# Patient Record
Sex: MALE | Race: BLACK OR AFRICAN AMERICAN | Employment: UNEMPLOYED | URBAN - METROPOLITAN AREA
[De-identification: names, ages, dates, MRNs, and addresses within clinical notes are randomized per-mention and may not be internally consistent; named-entity substitution may affect disease eponyms.]

---

## 2019-07-17 ENCOUNTER — HOSPITAL ENCOUNTER (EMERGENCY)
Age: 39
Discharge: HOME | End: 2019-07-17
Attending: EMERGENCY MEDICINE

## 2019-07-17 VITALS
RESPIRATION RATE: 17 BRPM | WEIGHT: 180 LBS | HEIGHT: 69 IN | OXYGEN SATURATION: 94 % | BODY MASS INDEX: 26.66 KG/M2 | TEMPERATURE: 97.9 F | SYSTOLIC BLOOD PRESSURE: 140 MMHG | DIASTOLIC BLOOD PRESSURE: 86 MMHG | HEART RATE: 109 BPM

## 2019-07-17 DIAGNOSIS — F10.930 ALCOHOL WITHDRAWAL SYNDROME WITHOUT COMPLICATION (HCC): Primary | ICD-10-CM

## 2019-07-17 PROCEDURE — 99284 EMERGENCY DEPT VISIT MOD MDM: CPT

## 2019-07-17 RX ORDER — DIAZEPAM 10 MG/1
10 TABLET ORAL ONCE
Status: DISCONTINUED | OUTPATIENT
Start: 2019-07-17 | End: 2019-07-17 | Stop reason: HOSPADM

## 2019-07-19 ASSESSMENT — ENCOUNTER SYMPTOMS
RHINORRHEA: 0
EYE PAIN: 0
ABDOMINAL DISTENTION: 0
DIARRHEA: 0
VOMITING: 0
TROUBLE SWALLOWING: 0
COUGH: 0
CHEST TIGHTNESS: 0
WHEEZING: 0
SORE THROAT: 0
SHORTNESS OF BREATH: 0
COLOR CHANGE: 0
ABDOMINAL PAIN: 0
NAUSEA: 0

## 2019-07-19 NOTE — ED PROVIDER NOTES
[address] [facility], [facility] ENCOUNTER          PHYSICIAN ASSISTANT NOTE       Date of evaluation: 7/17/2019    Chief Complaint     Alcohol Problem (wants help detoxing from etoh)      History of Present Illness     Fatoumata Thomas is a 42 y.o. male with a past medical history as noted below who presents to the Emergency Department with a complaint of concerns for alcohol withdrawal.  The patient reports that he has been an alcoholic for over 10 years. He reports that he wants to stop drinking and has been attempting to do so. He reports normally he drinks at least 1/5 of vodka on a daily basis. Over the past 2 days, he has decreased that intake to about half of the fifth. Today, he is starting to experience some agitation and restlessness. He feels shaky. Has a slight headache. He is concerned he is developing signs and symptoms of withdrawal.  He reports he was seen at an outside hospital a couple of days ago, where they prescribed him Librium. However, he reports that he is unfamiliar with this medication and reports he was not told anything about the medication, its side effects or its appropriate dosing. Because of this, he has not filled the prescription. He is worried, because he has had delirium tremens in the past and he does not want to go through that again. Currently he denies any fevers, chills, sweats or other constitutional symptoms. No seizure activity. No hallucinations or psychoses. He has no suicidal or homicidal ideations. Review of Systems     Review of Systems   Constitutional: Negative for chills, diaphoresis, fatigue and fever. HENT: Negative for congestion, postnasal drip, rhinorrhea, sore throat and trouble swallowing. Eyes: Negative for pain and visual disturbance. Respiratory: Negative for cough, chest tightness, shortness of breath and wheezing. Cardiovascular: Negative for chest pain, palpitations and leg swelling.    Gastrointestinal: Negative for abdominal distention, abdominal pain, diarrhea, nausea and vomiting. Endocrine: Negative for polydipsia and polyuria. Genitourinary: Negative for dysuria. Musculoskeletal: Negative for myalgias, neck pain and neck stiffness. Skin: Negative for color change, pallor and rash. Neurological: Positive for headaches. Negative for dizziness, weakness and light-headedness. Hematological: Does not bruise/bleed easily. Psychiatric/Behavioral: Positive for agitation and sleep disturbance. Negative for confusion, hallucinations, self-injury and suicidal ideas. The patient is nervous/anxious. All other systems reviewed and are negative. Past Medical, Surgical, Family, and Social History     He has no past medical history on file. He has no past surgical history on file. His family history is not on file. He     Medications     There are no discharge medications for this patient. Allergies     He has no allergies on file. Physical Exam     INITIAL VITALS: BP: (!) 140/99, Temp: 97.9 °F (36.6 °C), Pulse: 118, Resp: 22, SpO2: 94 %  Physical Exam   Constitutional: He is oriented to person, place, and time. He appears well-developed and well-nourished. No distress. HENT:   Head: Normocephalic and atraumatic. Eyes: Pupils are equal, round, and reactive to light. EOM are normal.   Neck: Normal range of motion. Neck supple. No JVD present. Cardiovascular: Regular rhythm. Tachycardia present. Pulmonary/Chest: Effort normal and breath sounds normal. No stridor. No respiratory distress. He has no wheezes. He has no rales. He exhibits no tenderness. Abdominal: Soft. He exhibits no distension. There is no tenderness. Musculoskeletal: Normal range of motion. He exhibits no edema or tenderness. Neurological: He is alert and oriented to person, place, and time. Coordination normal.   Skin: Skin is warm and dry. No rash noted. He is not diaphoretic. No erythema. No pallor.    Psychiatric:

## 2019-09-06 ENCOUNTER — HOSPITAL ENCOUNTER (EMERGENCY)
Age: 39
Discharge: HOME | End: 2019-09-06
Attending: EMERGENCY MEDICINE

## 2019-09-06 VITALS
HEIGHT: 69 IN | RESPIRATION RATE: 16 BRPM | SYSTOLIC BLOOD PRESSURE: 138 MMHG | OXYGEN SATURATION: 96 % | BODY MASS INDEX: 26.66 KG/M2 | DIASTOLIC BLOOD PRESSURE: 89 MMHG | WEIGHT: 180 LBS | HEART RATE: 114 BPM

## 2019-09-06 DIAGNOSIS — F10.930 ALCOHOL WITHDRAWAL SYNDROME WITHOUT COMPLICATION (HCC): Primary | ICD-10-CM

## 2019-09-06 PROCEDURE — 99284 EMERGENCY DEPT VISIT MOD MDM: CPT

## 2019-09-06 PROCEDURE — 6370000000 HC RX 637 (ALT 250 FOR IP): Performed by: EMERGENCY MEDICINE

## 2019-09-06 RX ORDER — DIAZEPAM 10 MG/1
10 TABLET ORAL ONCE
Status: COMPLETED | OUTPATIENT
Start: 2019-09-06 | End: 2019-09-06

## 2019-09-06 RX ORDER — CHLORDIAZEPOXIDE HYDROCHLORIDE 5 MG/1
5 CAPSULE, GELATIN COATED ORAL SEE ADMIN INSTRUCTIONS
Qty: 21 CAPSULE | Refills: 0 | Status: SHIPPED | OUTPATIENT
Start: 2019-09-06 | End: 2019-10-06

## 2019-09-06 RX ORDER — DIAZEPAM 5 MG/1
5 TABLET ORAL EVERY 12 HOURS
Qty: 14 TABLET | Refills: 0 | Status: SHIPPED | OUTPATIENT
Start: 2019-09-06 | End: 2019-09-06

## 2019-09-06 RX ADMIN — DIAZEPAM 10 MG: 10 TABLET ORAL at 15:33

## 2019-09-08 ENCOUNTER — HOSPITAL ENCOUNTER (EMERGENCY)
Age: 39
Discharge: HOME | End: 2019-09-08
Attending: EMERGENCY MEDICINE

## 2019-09-08 VITALS
HEIGHT: 69 IN | TEMPERATURE: 98.4 F | SYSTOLIC BLOOD PRESSURE: 158 MMHG | OXYGEN SATURATION: 97 % | WEIGHT: 185 LBS | BODY MASS INDEX: 27.4 KG/M2 | HEART RATE: 127 BPM | RESPIRATION RATE: 22 BRPM | DIASTOLIC BLOOD PRESSURE: 100 MMHG

## 2019-09-08 DIAGNOSIS — R11.2 NON-INTRACTABLE VOMITING WITH NAUSEA, UNSPECIFIED VOMITING TYPE: Primary | ICD-10-CM

## 2019-09-08 LAB
ANION GAP SERPL CALCULATED.3IONS-SCNC: 31 MMOL/L (ref 3–16)
BASE EXCESS VENOUS: 3 (ref -3–3)
BUN BLDV-MCNC: 20 MG/DL (ref 7–20)
CALCIUM SERPL-MCNC: 9.4 MG/DL (ref 8.3–10.6)
CHLORIDE BLD-SCNC: 77 MMOL/L (ref 99–110)
CO2: 20 MMOL/L (ref 21–32)
CREAT SERPL-MCNC: 1.3 MG/DL (ref 0.9–1.3)
GFR AFRICAN AMERICAN: >60
GFR NON-AFRICAN AMERICAN: >60
GLUCOSE BLD-MCNC: 110 MG/DL (ref 70–99)
HCO3 VENOUS: 27.1 MMOL/L (ref 23–29)
LACTATE: 1.5 MMOL/L (ref 0.4–2)
O2 SAT, VEN: 55 %
PCO2, VEN: 39.5 MM HG (ref 40–50)
PERFORMED ON: ABNORMAL
PH VENOUS: 7.45 (ref 7.35–7.45)
PO2, VEN: 28 MM HG
POC SAMPLE TYPE: ABNORMAL
POTASSIUM REFLEX MAGNESIUM: 4.1 MMOL/L (ref 3.5–5.1)
SODIUM BLD-SCNC: 128 MMOL/L (ref 136–145)
TCO2 CALC VENOUS: 28 MMOL/L

## 2019-09-08 PROCEDURE — 6360000002 HC RX W HCPCS: Performed by: STUDENT IN AN ORGANIZED HEALTH CARE EDUCATION/TRAINING PROGRAM

## 2019-09-08 PROCEDURE — 96361 HYDRATE IV INFUSION ADD-ON: CPT

## 2019-09-08 PROCEDURE — 2580000003 HC RX 258: Performed by: STUDENT IN AN ORGANIZED HEALTH CARE EDUCATION/TRAINING PROGRAM

## 2019-09-08 PROCEDURE — 83605 ASSAY OF LACTIC ACID: CPT

## 2019-09-08 PROCEDURE — 82803 BLOOD GASES ANY COMBINATION: CPT

## 2019-09-08 PROCEDURE — 6370000000 HC RX 637 (ALT 250 FOR IP): Performed by: STUDENT IN AN ORGANIZED HEALTH CARE EDUCATION/TRAINING PROGRAM

## 2019-09-08 PROCEDURE — 99283 EMERGENCY DEPT VISIT LOW MDM: CPT

## 2019-09-08 PROCEDURE — 96374 THER/PROPH/DIAG INJ IV PUSH: CPT

## 2019-09-08 PROCEDURE — 80048 BASIC METABOLIC PNL TOTAL CA: CPT

## 2019-09-08 RX ORDER — ONDANSETRON 2 MG/ML
8 INJECTION INTRAMUSCULAR; INTRAVENOUS ONCE
Status: COMPLETED | OUTPATIENT
Start: 2019-09-08 | End: 2019-09-08

## 2019-09-08 RX ORDER — ONDANSETRON 4 MG/1
4 TABLET, FILM COATED ORAL EVERY 8 HOURS PRN
Qty: 20 TABLET | Refills: 0 | Status: SHIPPED | OUTPATIENT
Start: 2019-09-08 | End: 2020-08-05

## 2019-09-08 RX ORDER — SODIUM CHLORIDE, SODIUM LACTATE, POTASSIUM CHLORIDE, CALCIUM CHLORIDE 600; 310; 30; 20 MG/100ML; MG/100ML; MG/100ML; MG/100ML
1000 INJECTION, SOLUTION INTRAVENOUS ONCE
Status: COMPLETED | OUTPATIENT
Start: 2019-09-08 | End: 2019-09-08

## 2019-09-08 RX ORDER — DIAZEPAM 10 MG/1
10 TABLET ORAL ONCE
Status: COMPLETED | OUTPATIENT
Start: 2019-09-08 | End: 2019-09-08

## 2019-09-08 RX ADMIN — ONDANSETRON 8 MG: 2 INJECTION INTRAMUSCULAR; INTRAVENOUS at 10:59

## 2019-09-08 RX ADMIN — SODIUM CHLORIDE, POTASSIUM CHLORIDE, SODIUM LACTATE AND CALCIUM CHLORIDE 1000 ML: 600; 310; 30; 20 INJECTION, SOLUTION INTRAVENOUS at 10:59

## 2019-09-08 RX ADMIN — DIAZEPAM 10 MG: 10 TABLET ORAL at 11:15

## 2019-12-17 ENCOUNTER — HOSPITAL ENCOUNTER (EMERGENCY)
Age: 39
Discharge: HOME | End: 2019-12-17
Attending: EMERGENCY MEDICINE

## 2019-12-17 VITALS
WEIGHT: 195 LBS | RESPIRATION RATE: 16 BRPM | OXYGEN SATURATION: 100 % | BODY MASS INDEX: 28.88 KG/M2 | HEIGHT: 69 IN | HEART RATE: 99 BPM | SYSTOLIC BLOOD PRESSURE: 128 MMHG | DIASTOLIC BLOOD PRESSURE: 85 MMHG | TEMPERATURE: 98.6 F

## 2019-12-17 DIAGNOSIS — R11.2 NAUSEA AND VOMITING, INTRACTABILITY OF VOMITING NOT SPECIFIED, UNSPECIFIED VOMITING TYPE: Primary | ICD-10-CM

## 2019-12-17 LAB
ALBUMIN SERPL-MCNC: 5.2 G/DL (ref 3.4–5)
ALP BLD-CCNC: 81 U/L (ref 40–129)
ALT SERPL-CCNC: 61 U/L (ref 10–40)
ANION GAP SERPL CALCULATED.3IONS-SCNC: 18 MMOL/L (ref 3–16)
ANION GAP SERPL CALCULATED.3IONS-SCNC: 24 MMOL/L (ref 3–16)
AST SERPL-CCNC: 91 U/L (ref 15–37)
BACTERIA: ABNORMAL /HPF
BASE EXCESS VENOUS: 7 (ref -3–3)
BASOPHILS ABSOLUTE: 0 K/UL (ref 0–0.2)
BASOPHILS RELATIVE PERCENT: 0.6 %
BILIRUB SERPL-MCNC: 1.3 MG/DL (ref 0–1)
BILIRUBIN DIRECT: 0.3 MG/DL (ref 0–0.3)
BILIRUBIN URINE: ABNORMAL
BILIRUBIN, INDIRECT: 1 MG/DL (ref 0–1)
BLOOD, URINE: ABNORMAL
BUN BLDV-MCNC: 28 MG/DL (ref 7–20)
BUN BLDV-MCNC: 33 MG/DL (ref 7–20)
CALCIUM SERPL-MCNC: 10.4 MG/DL (ref 8.3–10.6)
CALCIUM SERPL-MCNC: 9.9 MG/DL (ref 8.3–10.6)
CASTS: ABNORMAL /LPF
CHLORIDE BLD-SCNC: 75 MMOL/L (ref 99–110)
CHLORIDE BLD-SCNC: 82 MMOL/L (ref 99–110)
CLARITY: CLEAR
CO2: 28 MMOL/L (ref 21–32)
CO2: 28 MMOL/L (ref 21–32)
COLOR: YELLOW
CREAT SERPL-MCNC: 1.3 MG/DL (ref 0.9–1.3)
CREAT SERPL-MCNC: 1.9 MG/DL (ref 0.9–1.3)
EOSINOPHILS ABSOLUTE: 0 K/UL (ref 0–0.6)
EOSINOPHILS RELATIVE PERCENT: 0.9 %
EPITHELIAL CELLS, UA: ABNORMAL /HPF
GFR AFRICAN AMERICAN: 48
GFR AFRICAN AMERICAN: >60
GFR NON-AFRICAN AMERICAN: 40
GFR NON-AFRICAN AMERICAN: >60
GLUCOSE BLD-MCNC: 101 MG/DL (ref 70–99)
GLUCOSE BLD-MCNC: 138 MG/DL (ref 70–99)
GLUCOSE URINE: NEGATIVE MG/DL
HCO3 VENOUS: 31.1 MMOL/L (ref 23–29)
HCT VFR BLD CALC: 42.7 % (ref 40.5–52.5)
HEMOGLOBIN: 14.8 G/DL (ref 13.5–17.5)
KETONES, URINE: 15 MG/DL
LEUKOCYTE ESTERASE, URINE: ABNORMAL
LIPASE: 54 U/L (ref 13–60)
LYMPHOCYTES ABSOLUTE: 0.7 K/UL (ref 1–5.1)
LYMPHOCYTES RELATIVE PERCENT: 18.1 %
MAGNESIUM: 2 MG/DL (ref 1.8–2.4)
MAGNESIUM: 2 MG/DL (ref 1.8–2.4)
MCH RBC QN AUTO: 34.5 PG (ref 26–34)
MCHC RBC AUTO-ENTMCNC: 34.7 G/DL (ref 31–36)
MCV RBC AUTO: 99.3 FL (ref 80–100)
MICROSCOPIC EXAMINATION: YES
MONOCYTES ABSOLUTE: 0.8 K/UL (ref 0–1.3)
MONOCYTES RELATIVE PERCENT: 19.9 %
MUCUS: ABNORMAL /LPF
NEUTROPHILS ABSOLUTE: 2.4 K/UL (ref 1.7–7.7)
NEUTROPHILS RELATIVE PERCENT: 60.5 %
NITRITE, URINE: NEGATIVE
O2 SAT, VEN: 52 %
PCO2, VEN: 42.6 MM HG (ref 40–50)
PDW BLD-RTO: 13.8 % (ref 12.4–15.4)
PERFORMED ON: ABNORMAL
PH UA: 6 (ref 5–8)
PH VENOUS: 7.47 (ref 7.35–7.45)
PLATELET # BLD: 181 K/UL (ref 135–450)
PMV BLD AUTO: 7.5 FL (ref 5–10.5)
PO2, VEN: 26 MM HG
POC SAMPLE TYPE: ABNORMAL
POTASSIUM REFLEX MAGNESIUM: 2.8 MMOL/L (ref 3.5–5.1)
POTASSIUM REFLEX MAGNESIUM: 3.5 MMOL/L (ref 3.5–5.1)
PROTEIN UA: 30 MG/DL
RBC # BLD: 4.3 M/UL (ref 4.2–5.9)
RBC UA: ABNORMAL /HPF (ref 0–2)
SODIUM BLD-SCNC: 127 MMOL/L (ref 136–145)
SODIUM BLD-SCNC: 128 MMOL/L (ref 136–145)
SPECIFIC GRAVITY UA: 1.02 (ref 1–1.03)
TCO2 CALC VENOUS: 32 MMOL/L
TOTAL PROTEIN: 8.6 G/DL (ref 6.4–8.2)
URINE TYPE: ABNORMAL
UROBILINOGEN, URINE: 0.2 E.U./DL
WBC # BLD: 4 K/UL (ref 4–11)
WBC UA: ABNORMAL /HPF (ref 0–5)

## 2019-12-17 PROCEDURE — 83735 ASSAY OF MAGNESIUM: CPT

## 2019-12-17 PROCEDURE — 80048 BASIC METABOLIC PNL TOTAL CA: CPT

## 2019-12-17 PROCEDURE — 96375 TX/PRO/DX INJ NEW DRUG ADDON: CPT

## 2019-12-17 PROCEDURE — 83690 ASSAY OF LIPASE: CPT

## 2019-12-17 PROCEDURE — 81001 URINALYSIS AUTO W/SCOPE: CPT

## 2019-12-17 PROCEDURE — 96365 THER/PROPH/DIAG IV INF INIT: CPT

## 2019-12-17 PROCEDURE — 80076 HEPATIC FUNCTION PANEL: CPT

## 2019-12-17 PROCEDURE — 6360000002 HC RX W HCPCS: Performed by: STUDENT IN AN ORGANIZED HEALTH CARE EDUCATION/TRAINING PROGRAM

## 2019-12-17 PROCEDURE — 2580000003 HC RX 258: Performed by: STUDENT IN AN ORGANIZED HEALTH CARE EDUCATION/TRAINING PROGRAM

## 2019-12-17 PROCEDURE — 85025 COMPLETE CBC W/AUTO DIFF WBC: CPT

## 2019-12-17 PROCEDURE — 36415 COLL VENOUS BLD VENIPUNCTURE: CPT

## 2019-12-17 PROCEDURE — 82803 BLOOD GASES ANY COMBINATION: CPT

## 2019-12-17 PROCEDURE — 99284 EMERGENCY DEPT VISIT MOD MDM: CPT

## 2019-12-17 PROCEDURE — 96361 HYDRATE IV INFUSION ADD-ON: CPT

## 2019-12-17 RX ORDER — POTASSIUM CHLORIDE 7.45 MG/ML
10 INJECTION INTRAVENOUS ONCE
Status: COMPLETED | OUTPATIENT
Start: 2019-12-17 | End: 2019-12-17

## 2019-12-17 RX ORDER — FAMOTIDINE 20 MG/1
20 TABLET, FILM COATED ORAL 2 TIMES DAILY
Qty: 60 TABLET | Refills: 0 | Status: SHIPPED | OUTPATIENT
Start: 2019-12-17 | End: 2020-09-29 | Stop reason: ALTCHOICE

## 2019-12-17 RX ORDER — ONDANSETRON 4 MG/1
4 TABLET, FILM COATED ORAL EVERY 8 HOURS PRN
Qty: 20 TABLET | Refills: 0 | Status: SHIPPED | OUTPATIENT
Start: 2019-12-17 | End: 2019-12-24

## 2019-12-17 RX ORDER — 0.9 % SODIUM CHLORIDE 0.9 %
1000 INTRAVENOUS SOLUTION INTRAVENOUS ONCE
Status: COMPLETED | OUTPATIENT
Start: 2019-12-17 | End: 2019-12-17

## 2019-12-17 RX ORDER — CHLORDIAZEPOXIDE HYDROCHLORIDE 10 MG/1
CAPSULE, GELATIN COATED ORAL
Refills: 0 | COMMUNITY
Start: 2019-12-08 | End: 2020-08-05

## 2019-12-17 RX ORDER — LISINOPRIL 40 MG/1
TABLET ORAL
Refills: 0 | COMMUNITY
Start: 2019-11-29 | End: 2020-04-04

## 2019-12-17 RX ORDER — ONDANSETRON 2 MG/ML
8 INJECTION INTRAMUSCULAR; INTRAVENOUS ONCE
Status: COMPLETED | OUTPATIENT
Start: 2019-12-17 | End: 2019-12-17

## 2019-12-17 RX ORDER — SODIUM CHLORIDE, SODIUM LACTATE, POTASSIUM CHLORIDE, CALCIUM CHLORIDE 600; 310; 30; 20 MG/100ML; MG/100ML; MG/100ML; MG/100ML
1000 INJECTION, SOLUTION INTRAVENOUS ONCE
Status: COMPLETED | OUTPATIENT
Start: 2019-12-17 | End: 2019-12-17

## 2019-12-17 RX ADMIN — ONDANSETRON 8 MG: 2 INJECTION INTRAMUSCULAR; INTRAVENOUS at 12:26

## 2019-12-17 RX ADMIN — POTASSIUM CHLORIDE 10 MEQ: 7.46 INJECTION, SOLUTION INTRAVENOUS at 13:10

## 2019-12-17 RX ADMIN — SODIUM CHLORIDE, POTASSIUM CHLORIDE, SODIUM LACTATE AND CALCIUM CHLORIDE 1000 ML: 600; 310; 30; 20 INJECTION, SOLUTION INTRAVENOUS at 12:25

## 2019-12-17 RX ADMIN — SODIUM CHLORIDE 1000 ML: 9 INJECTION, SOLUTION INTRAVENOUS at 13:10

## 2020-04-04 ENCOUNTER — HOSPITAL ENCOUNTER (EMERGENCY)
Age: 40
Discharge: HOME | End: 2020-04-04
Attending: EMERGENCY MEDICINE

## 2020-04-04 VITALS
SYSTOLIC BLOOD PRESSURE: 166 MMHG | RESPIRATION RATE: 18 BRPM | WEIGHT: 195 LBS | TEMPERATURE: 98.6 F | DIASTOLIC BLOOD PRESSURE: 98 MMHG | OXYGEN SATURATION: 98 % | BODY MASS INDEX: 28.8 KG/M2 | HEART RATE: 102 BPM

## 2020-04-04 PROCEDURE — 6370000000 HC RX 637 (ALT 250 FOR IP): Performed by: EMERGENCY MEDICINE

## 2020-04-04 PROCEDURE — 99283 EMERGENCY DEPT VISIT LOW MDM: CPT

## 2020-04-04 RX ORDER — ONDANSETRON 4 MG/1
4 TABLET, ORALLY DISINTEGRATING ORAL EVERY 8 HOURS PRN
Qty: 20 TABLET | Refills: 0 | Status: SHIPPED | OUTPATIENT
Start: 2020-04-04 | End: 2020-08-05

## 2020-04-04 RX ORDER — CARVEDILOL 6.25 MG/1
6.25 TABLET ORAL 2 TIMES DAILY WITH MEALS
COMMUNITY
End: 2020-09-29 | Stop reason: SINTOL

## 2020-04-04 RX ORDER — DIAZEPAM 5 MG/1
5 TABLET ORAL PRN
Qty: 17 TABLET | Refills: 0 | Status: SHIPPED | OUTPATIENT
Start: 2020-04-04 | End: 2020-04-14

## 2020-04-04 RX ORDER — DIAZEPAM 10 MG/1
10 TABLET ORAL ONCE
Status: COMPLETED | OUTPATIENT
Start: 2020-04-04 | End: 2020-04-04

## 2020-04-04 RX ADMIN — DIAZEPAM 10 MG: 10 TABLET ORAL at 08:40

## 2020-04-04 ASSESSMENT — ENCOUNTER SYMPTOMS
NAUSEA: 1
CHEST TIGHTNESS: 0
VOMITING: 1
SHORTNESS OF BREATH: 0
ABDOMINAL PAIN: 1

## 2020-04-04 ASSESSMENT — PAIN DESCRIPTION - FREQUENCY: FREQUENCY: CONTINUOUS

## 2020-04-04 ASSESSMENT — PAIN DESCRIPTION - LOCATION: LOCATION: ABDOMEN

## 2020-04-04 ASSESSMENT — PAIN SCALES - GENERAL: PAINLEVEL_OUTOF10: 8

## 2020-04-04 NOTE — ED PROVIDER NOTES
by mouth 2 times daily    ONDANSETRON (ZOFRAN) 4 MG TABLET    Take 1 tablet by mouth every 8 hours as needed for Nausea       Allergies     He is allergic to banana and tree nut  [macadamia nut oil]. Physical Exam     INITIAL VITALS: BP: (!) 181/118, Temp: 98.6 °F (37 °C), Pulse: 110, Resp: 18, SpO2: 96 %   Physical Exam  Vitals signs and nursing note reviewed. Constitutional:       General: He is not in acute distress. Appearance: He is well-developed. HENT:      Head: Normocephalic and atraumatic. Nose: No rhinorrhea. Eyes:      Pupils: Pupils are equal, round, and reactive to light. Neck:      Musculoskeletal: Normal range of motion. Cardiovascular:      Rate and Rhythm: Regular rhythm. Tachycardia present. Heart sounds: Normal heart sounds. No murmur. Pulmonary:      Effort: No respiratory distress. Breath sounds: Normal breath sounds. No wheezing or rales. Abdominal:      General: There is no distension. Tenderness: There is no abdominal tenderness. Musculoskeletal: Normal range of motion. Skin:     General: Skin is warm and dry. Neurological:      Mental Status: He is alert and oriented to person, place, and time. Cranial Nerves: No cranial nerve deficit. Coordination: Coordination normal.   Psychiatric:         Mood and Affect: Mood is anxious. Behavior: Behavior is hyperactive. Diagnostic Results     RADIOLOGY:  No orders to display       LABS:   No results found for this visit on 04/04/20. RECENT VITALS:  BP: (!) 181/118,Temp: 98.6 °F (37 °C), Pulse: 110, Resp: 18, SpO2: 96 %       ED Course     Nursing Notes, Past Medical Hx, Past Surgical Hx, Social Hx,Allergies, and Family Hx were reviewed.     patient was given the following medications:  Orders Placed This Encounter   Medications    diazePAM (VALIUM) tablet 10 mg       CONSULTS:  None    MEDICAL DECISIONMAKING / ASSESSMENT / PLAN     Lady Driver is a 47 y.o. male who presents stating that he is having difficulty with alcohol withdrawal.  Had a CIWA scale of 10 here. Patient's last drink was just about 8 hours ago. Given Valium here with significant relief of symptoms. Will give short course of Valium prescription. Patient warned of the dangers of mixing benzodiazepines with alcohol. He does live at home with his father who can check on him. Clinical Impression     1. Alcohol abuse    2. Alcohol withdrawal syndrome without complication (HCC)        Disposition     PATIENT REFERRED TO:  No follow-up provider specified.     DISCHARGE MEDICATIONS:  New Prescriptions    No medications on file       DISPOSITION Decision To Discharge 04/04/2020 08:28:59 AM       Rita Hammond MD  04/04/20 0547

## 2020-08-05 ENCOUNTER — HOSPITAL ENCOUNTER (EMERGENCY)
Age: 40
Discharge: HOME | End: 2020-08-05
Attending: EMERGENCY MEDICINE
Payer: MEDICAID

## 2020-08-05 VITALS
BODY MASS INDEX: 28.8 KG/M2 | HEART RATE: 83 BPM | RESPIRATION RATE: 16 BRPM | OXYGEN SATURATION: 96 % | DIASTOLIC BLOOD PRESSURE: 102 MMHG | WEIGHT: 195 LBS | SYSTOLIC BLOOD PRESSURE: 145 MMHG | TEMPERATURE: 98 F

## 2020-08-05 LAB
ALBUMIN SERPL-MCNC: 5.1 G/DL (ref 3.4–5)
ALP BLD-CCNC: 94 U/L (ref 40–129)
ALT SERPL-CCNC: 78 U/L (ref 10–40)
ANION GAP SERPL CALCULATED.3IONS-SCNC: 23 MMOL/L (ref 3–16)
AST SERPL-CCNC: 183 U/L (ref 15–37)
BASOPHILS ABSOLUTE: 0 K/UL (ref 0–0.2)
BASOPHILS RELATIVE PERCENT: 0.8 %
BILIRUB SERPL-MCNC: 0.6 MG/DL (ref 0–1)
BILIRUBIN DIRECT: <0.2 MG/DL (ref 0–0.3)
BILIRUBIN, INDIRECT: ABNORMAL MG/DL (ref 0–1)
BUN BLDV-MCNC: 10 MG/DL (ref 7–20)
CALCIUM SERPL-MCNC: 9.7 MG/DL (ref 8.3–10.6)
CHLORIDE BLD-SCNC: 96 MMOL/L (ref 99–110)
CO2: 20 MMOL/L (ref 21–32)
CREAT SERPL-MCNC: 1 MG/DL (ref 0.9–1.3)
EOSINOPHILS ABSOLUTE: 0.1 K/UL (ref 0–0.6)
EOSINOPHILS RELATIVE PERCENT: 1.6 %
ETHANOL: 73 MG/DL (ref 0–0.08)
GFR AFRICAN AMERICAN: >60
GFR NON-AFRICAN AMERICAN: >60
GLUCOSE BLD-MCNC: 121 MG/DL (ref 70–99)
HCT VFR BLD CALC: 37.2 % (ref 40.5–52.5)
HEMOGLOBIN: 12.6 G/DL (ref 13.5–17.5)
LIPASE: 72 U/L (ref 13–60)
LYMPHOCYTES ABSOLUTE: 1.2 K/UL (ref 1–5.1)
LYMPHOCYTES RELATIVE PERCENT: 31.8 %
MAGNESIUM: 1.9 MG/DL (ref 1.8–2.4)
MCH RBC QN AUTO: 34.5 PG (ref 26–34)
MCHC RBC AUTO-ENTMCNC: 33.9 G/DL (ref 31–36)
MCV RBC AUTO: 101.8 FL (ref 80–100)
MONOCYTES ABSOLUTE: 0.4 K/UL (ref 0–1.3)
MONOCYTES RELATIVE PERCENT: 9.9 %
NEUTROPHILS ABSOLUTE: 2.1 K/UL (ref 1.7–7.7)
NEUTROPHILS RELATIVE PERCENT: 55.9 %
PDW BLD-RTO: 15.3 % (ref 12.4–15.4)
PHOSPHORUS: 3.4 MG/DL (ref 2.5–4.9)
PLATELET # BLD: 166 K/UL (ref 135–450)
PMV BLD AUTO: 7 FL (ref 5–10.5)
POTASSIUM SERPL-SCNC: 3.9 MMOL/L (ref 3.5–5.1)
RBC # BLD: 3.65 M/UL (ref 4.2–5.9)
SODIUM BLD-SCNC: 139 MMOL/L (ref 136–145)
TOTAL PROTEIN: 8.4 G/DL (ref 6.4–8.2)
WBC # BLD: 3.8 K/UL (ref 4–11)

## 2020-08-05 PROCEDURE — 84100 ASSAY OF PHOSPHORUS: CPT

## 2020-08-05 PROCEDURE — 2500000003 HC RX 250 WO HCPCS: Performed by: EMERGENCY MEDICINE

## 2020-08-05 PROCEDURE — 83690 ASSAY OF LIPASE: CPT

## 2020-08-05 PROCEDURE — 83735 ASSAY OF MAGNESIUM: CPT

## 2020-08-05 PROCEDURE — 6360000002 HC RX W HCPCS: Performed by: EMERGENCY MEDICINE

## 2020-08-05 PROCEDURE — 99284 EMERGENCY DEPT VISIT MOD MDM: CPT

## 2020-08-05 PROCEDURE — 6370000000 HC RX 637 (ALT 250 FOR IP): Performed by: EMERGENCY MEDICINE

## 2020-08-05 PROCEDURE — 2580000003 HC RX 258: Performed by: EMERGENCY MEDICINE

## 2020-08-05 PROCEDURE — 96374 THER/PROPH/DIAG INJ IV PUSH: CPT

## 2020-08-05 PROCEDURE — G0480 DRUG TEST DEF 1-7 CLASSES: HCPCS

## 2020-08-05 PROCEDURE — 80048 BASIC METABOLIC PNL TOTAL CA: CPT

## 2020-08-05 PROCEDURE — 85025 COMPLETE CBC W/AUTO DIFF WBC: CPT

## 2020-08-05 PROCEDURE — 80076 HEPATIC FUNCTION PANEL: CPT

## 2020-08-05 RX ORDER — DIAZEPAM 10 MG/1
10 TABLET ORAL ONCE
Status: COMPLETED | OUTPATIENT
Start: 2020-08-05 | End: 2020-08-05

## 2020-08-05 RX ORDER — ONDANSETRON 4 MG/1
4 TABLET, ORALLY DISINTEGRATING ORAL EVERY 8 HOURS PRN
Qty: 20 TABLET | Refills: 0 | Status: SHIPPED | OUTPATIENT
Start: 2020-08-05 | End: 2020-09-29 | Stop reason: ALTCHOICE

## 2020-08-05 RX ORDER — 0.9 % SODIUM CHLORIDE 0.9 %
1000 INTRAVENOUS SOLUTION INTRAVENOUS ONCE
Status: COMPLETED | OUTPATIENT
Start: 2020-08-05 | End: 2020-08-05

## 2020-08-05 RX ORDER — DIAZEPAM 5 MG/1
TABLET ORAL
Qty: 39 TABLET | Refills: 0 | Status: SHIPPED | OUTPATIENT
Start: 2020-08-05 | End: 2020-08-16

## 2020-08-05 RX ORDER — ONDANSETRON 2 MG/ML
4 INJECTION INTRAMUSCULAR; INTRAVENOUS ONCE
Status: COMPLETED | OUTPATIENT
Start: 2020-08-05 | End: 2020-08-05

## 2020-08-05 RX ADMIN — DIAZEPAM 10 MG: 10 TABLET ORAL at 06:14

## 2020-08-05 RX ADMIN — ONDANSETRON 4 MG: 2 INJECTION INTRAMUSCULAR; INTRAVENOUS at 05:50

## 2020-08-05 RX ADMIN — FOLIC ACID: 5 INJECTION, SOLUTION INTRAMUSCULAR; INTRAVENOUS; SUBCUTANEOUS at 06:50

## 2020-08-05 RX ADMIN — SODIUM CHLORIDE 1000 ML: 9 INJECTION, SOLUTION INTRAVENOUS at 05:50

## 2020-08-05 ASSESSMENT — ENCOUNTER SYMPTOMS
NAUSEA: 1
DIARRHEA: 0
RESPIRATORY NEGATIVE: 1
ABDOMINAL PAIN: 0
EYES NEGATIVE: 1
CONSTIPATION: 0
VOMITING: 1

## 2020-09-10 ENCOUNTER — HOSPITAL ENCOUNTER (EMERGENCY)
Age: 40
Discharge: HOME | End: 2020-09-10
Attending: EMERGENCY MEDICINE
Payer: MEDICAID

## 2020-09-10 VITALS
TEMPERATURE: 98.1 F | WEIGHT: 195 LBS | OXYGEN SATURATION: 98 % | DIASTOLIC BLOOD PRESSURE: 104 MMHG | HEART RATE: 93 BPM | SYSTOLIC BLOOD PRESSURE: 160 MMHG | RESPIRATION RATE: 20 BRPM | HEIGHT: 69 IN | BODY MASS INDEX: 28.88 KG/M2

## 2020-09-10 DIAGNOSIS — F10.930 ALCOHOL WITHDRAWAL SYNDROME WITHOUT COMPLICATION (HCC): Primary | ICD-10-CM

## 2020-09-10 LAB
ALBUMIN SERPL-MCNC: 5.4 G/DL (ref 3.4–5)
ALP BLD-CCNC: 97 U/L (ref 40–129)
ALT SERPL-CCNC: 71 U/L (ref 10–40)
ANION GAP SERPL CALCULATED.3IONS-SCNC: 23 MMOL/L (ref 3–16)
ANION GAP SERPL CALCULATED.3IONS-SCNC: 33 MMOL/L (ref 3–16)
AST SERPL-CCNC: 101 U/L (ref 15–37)
BASOPHILS ABSOLUTE: 0 K/UL (ref 0–0.2)
BASOPHILS RELATIVE PERCENT: 0.4 %
BILIRUB SERPL-MCNC: 1.1 MG/DL (ref 0–1)
BILIRUBIN DIRECT: <0.2 MG/DL (ref 0–0.3)
BILIRUBIN, INDIRECT: ABNORMAL MG/DL (ref 0–1)
BUN BLDV-MCNC: 17 MG/DL (ref 7–20)
BUN BLDV-MCNC: 18 MG/DL (ref 7–20)
CALCIUM SERPL-MCNC: 10 MG/DL (ref 8.3–10.6)
CALCIUM SERPL-MCNC: 9.4 MG/DL (ref 8.3–10.6)
CHLORIDE BLD-SCNC: 85 MMOL/L (ref 99–110)
CHLORIDE BLD-SCNC: 88 MMOL/L (ref 99–110)
CO2: 14 MMOL/L (ref 21–32)
CO2: 21 MMOL/L (ref 21–32)
CREAT SERPL-MCNC: 1.7 MG/DL (ref 0.9–1.3)
CREAT SERPL-MCNC: 2 MG/DL (ref 0.9–1.3)
EOSINOPHILS ABSOLUTE: 0 K/UL (ref 0–0.6)
EOSINOPHILS RELATIVE PERCENT: 0 %
GFR AFRICAN AMERICAN: 45
GFR AFRICAN AMERICAN: 55
GFR NON-AFRICAN AMERICAN: 37
GFR NON-AFRICAN AMERICAN: 45
GLUCOSE BLD-MCNC: 115 MG/DL (ref 70–99)
GLUCOSE BLD-MCNC: 155 MG/DL (ref 70–99)
HCT VFR BLD CALC: 45 % (ref 40.5–52.5)
HEMOGLOBIN: 15.3 G/DL (ref 13.5–17.5)
LIPASE: 64 U/L (ref 13–60)
LYMPHOCYTES ABSOLUTE: 0.9 K/UL (ref 1–5.1)
LYMPHOCYTES RELATIVE PERCENT: 13.3 %
MCH RBC QN AUTO: 33.9 PG (ref 26–34)
MCHC RBC AUTO-ENTMCNC: 34 G/DL (ref 31–36)
MCV RBC AUTO: 99.8 FL (ref 80–100)
MONOCYTES ABSOLUTE: 1 K/UL (ref 0–1.3)
MONOCYTES RELATIVE PERCENT: 14.3 %
NEUTROPHILS ABSOLUTE: 5 K/UL (ref 1.7–7.7)
NEUTROPHILS RELATIVE PERCENT: 72 %
PDW BLD-RTO: 15.2 % (ref 12.4–15.4)
PLATELET # BLD: 331 K/UL (ref 135–450)
PMV BLD AUTO: 6.8 FL (ref 5–10.5)
POTASSIUM REFLEX MAGNESIUM: 4.7 MMOL/L (ref 3.5–5.1)
POTASSIUM REFLEX MAGNESIUM: 5.2 MMOL/L (ref 3.5–5.1)
RBC # BLD: 4.51 M/UL (ref 4.2–5.9)
SODIUM BLD-SCNC: 132 MMOL/L (ref 136–145)
SODIUM BLD-SCNC: 132 MMOL/L (ref 136–145)
TOTAL PROTEIN: 9.3 G/DL (ref 6.4–8.2)
WBC # BLD: 6.9 K/UL (ref 4–11)

## 2020-09-10 PROCEDURE — 85025 COMPLETE CBC W/AUTO DIFF WBC: CPT

## 2020-09-10 PROCEDURE — 96374 THER/PROPH/DIAG INJ IV PUSH: CPT

## 2020-09-10 PROCEDURE — 80076 HEPATIC FUNCTION PANEL: CPT

## 2020-09-10 PROCEDURE — 96361 HYDRATE IV INFUSION ADD-ON: CPT

## 2020-09-10 PROCEDURE — 80048 BASIC METABOLIC PNL TOTAL CA: CPT

## 2020-09-10 PROCEDURE — 83690 ASSAY OF LIPASE: CPT

## 2020-09-10 PROCEDURE — 6370000000 HC RX 637 (ALT 250 FOR IP): Performed by: STUDENT IN AN ORGANIZED HEALTH CARE EDUCATION/TRAINING PROGRAM

## 2020-09-10 PROCEDURE — 99285 EMERGENCY DEPT VISIT HI MDM: CPT

## 2020-09-10 PROCEDURE — 2580000003 HC RX 258: Performed by: STUDENT IN AN ORGANIZED HEALTH CARE EDUCATION/TRAINING PROGRAM

## 2020-09-10 PROCEDURE — 96372 THER/PROPH/DIAG INJ SC/IM: CPT

## 2020-09-10 PROCEDURE — 6360000002 HC RX W HCPCS: Performed by: STUDENT IN AN ORGANIZED HEALTH CARE EDUCATION/TRAINING PROGRAM

## 2020-09-10 PROCEDURE — 2500000003 HC RX 250 WO HCPCS: Performed by: STUDENT IN AN ORGANIZED HEALTH CARE EDUCATION/TRAINING PROGRAM

## 2020-09-10 RX ORDER — SODIUM CHLORIDE, SODIUM LACTATE, POTASSIUM CHLORIDE, CALCIUM CHLORIDE 600; 310; 30; 20 MG/100ML; MG/100ML; MG/100ML; MG/100ML
1000 INJECTION, SOLUTION INTRAVENOUS ONCE
Status: COMPLETED | OUTPATIENT
Start: 2020-09-10 | End: 2020-09-10

## 2020-09-10 RX ORDER — CARVEDILOL 6.25 MG/1
6.25 TABLET ORAL 2 TIMES DAILY WITH MEALS
Status: DISCONTINUED | OUTPATIENT
Start: 2020-09-10 | End: 2020-09-10 | Stop reason: HOSPADM

## 2020-09-10 RX ORDER — CARVEDILOL 6.25 MG/1
6.25 TABLET ORAL ONCE
Status: COMPLETED | OUTPATIENT
Start: 2020-09-10 | End: 2020-09-10

## 2020-09-10 RX ORDER — ONDANSETRON 2 MG/ML
4 INJECTION INTRAMUSCULAR; INTRAVENOUS ONCE
Status: COMPLETED | OUTPATIENT
Start: 2020-09-10 | End: 2020-09-10

## 2020-09-10 RX ORDER — SODIUM CHLORIDE, SODIUM LACTATE, POTASSIUM CHLORIDE, CALCIUM CHLORIDE 600; 310; 30; 20 MG/100ML; MG/100ML; MG/100ML; MG/100ML
INJECTION, SOLUTION INTRAVENOUS
Status: DISCONTINUED
Start: 2020-09-10 | End: 2020-09-10 | Stop reason: HOSPADM

## 2020-09-10 RX ORDER — DIAZEPAM 10 MG/1
10 TABLET ORAL ONCE
Status: COMPLETED | OUTPATIENT
Start: 2020-09-10 | End: 2020-09-10

## 2020-09-10 RX ORDER — PROMETHAZINE HYDROCHLORIDE 25 MG/ML
25 INJECTION, SOLUTION INTRAMUSCULAR; INTRAVENOUS ONCE
Status: COMPLETED | OUTPATIENT
Start: 2020-09-10 | End: 2020-09-10

## 2020-09-10 RX ADMIN — SODIUM CHLORIDE, POTASSIUM CHLORIDE, SODIUM LACTATE AND CALCIUM CHLORIDE 1000 ML: 600; 310; 30; 20 INJECTION, SOLUTION INTRAVENOUS at 11:36

## 2020-09-10 RX ADMIN — SODIUM CHLORIDE, POTASSIUM CHLORIDE, SODIUM LACTATE AND CALCIUM CHLORIDE 1000 ML: 600; 310; 30; 20 INJECTION, SOLUTION INTRAVENOUS at 13:11

## 2020-09-10 RX ADMIN — LIDOCAINE HYDROCHLORIDE: 20 SOLUTION ORAL; TOPICAL at 14:03

## 2020-09-10 RX ADMIN — CARVEDILOL 6.25 MG: 6.25 TABLET, FILM COATED ORAL at 16:52

## 2020-09-10 RX ADMIN — PROMETHAZINE HYDROCHLORIDE 25 MG: 25 INJECTION INTRAMUSCULAR; INTRAVENOUS at 13:12

## 2020-09-10 RX ADMIN — CARVEDILOL 6.25 MG: 6.25 TABLET, FILM COATED ORAL at 11:36

## 2020-09-10 RX ADMIN — ONDANSETRON HYDROCHLORIDE 4 MG: 2 SOLUTION INTRAMUSCULAR; INTRAVENOUS at 11:26

## 2020-09-10 RX ADMIN — THIAMINE HYDROCHLORIDE: 100 INJECTION, SOLUTION INTRAMUSCULAR; INTRAVENOUS at 12:40

## 2020-09-10 RX ADMIN — DIAZEPAM 10 MG: 10 TABLET ORAL at 11:36

## 2020-09-10 ASSESSMENT — ENCOUNTER SYMPTOMS
BLOOD IN STOOL: 0
CHEST TIGHTNESS: 0
EYE PAIN: 0
SHORTNESS OF BREATH: 0
CONSTIPATION: 0
DIARRHEA: 0
ABDOMINAL PAIN: 0
VOMITING: 1
WHEEZING: 0
NAUSEA: 1
COUGH: 0

## 2020-09-10 NOTE — ED PROVIDER NOTES
[facility] ENCOUNTER          EM RESIDENT NOTE       Date of evaluation: 9/10/2020    Chief Complaint     Alcohol Problem    of Present Illness     Claudean Mull is a 47 y.o. male with history of hypertension and alcohol use presenting for alcohol withdrawal.  Patient states that he stopped drinking alcohol on Monday, 4 days ago and has been experiencing nausea and vomiting since then. Nonbloody emesis. Patient has a history of alcohol use, drinks 1/5 a day of vodka. Patient has had several episodes of rehab followed by relapse, longest rehab period was 4 months with no drinking. Patient is currently being seen by rehab at [facility]. Patient denies any history of withdrawal seizures before. Patient denies any abdominal pain, but does report some cramping. Patient has not been able to tolerate any p.o. in the last 4 days. Patient has sublingual Zofran that he takes at home but he has not been able to help the symptoms. Patient feels that this episode is very similar to previous episodes of withdrawal in the past.  No urinary symptoms, no back pain, no chest pain, no shortness of breath. Patient does state he feels mildly anxious, denies any hallucinations. Of note, patient does have history of hypertension, he has not taken his medications in over 2 weeks. Review of Systems     Review of Systems   Constitutional: Negative for appetite change, chills and fever. HENT: Negative for ear pain. Eyes: Negative for pain. Respiratory: Negative for cough, chest tightness, shortness of breath and wheezing. Cardiovascular: Negative for chest pain. Gastrointestinal: Positive for nausea and vomiting. Negative for abdominal pain, blood in stool, constipation and diarrhea. Genitourinary: Negative for difficulty urinating, dysuria, flank pain and hematuria. Skin: Negative for rash. Neurological: Negative for light-headedness, numbness and headaches. Psychiatric/Behavioral: The patient is nervous/anxious. Past Medical, Surgical, Family, and Social History     He has a past medical history of Hypertension. He has no past surgical history on file. His family history is not on file. He reports that he has been smoking e-cigarettes. He has been smoking about 0.00 packs per day. He has never used smokeless tobacco. He reports current alcohol use. He reports that he does not use drugs. Medications     Previous Medications    CARVEDILOL (COREG) 6.25 MG TABLET    Take 6.25 mg by mouth 2 times daily (with meals)    FAMOTIDINE (PEPCID) 20 MG TABLET    Take 1 tablet by mouth 2 times daily    ONDANSETRON (ZOFRAN ODT) 4 MG DISINTEGRATING TABLET    Take 1 tablet by mouth every 8 hours as needed for Nausea       Allergies     He is allergic to banana and tree nut  [macadamia nut oil]. Physical Exam     INITIAL VITALS: BP: (!) 183/158, Temp: 98.1 °F (36.7 °C), Pulse: 120, Resp: 20, SpO2: 94 %   Physical Exam  Vitals signs and nursing note reviewed. Constitutional:       General: He is not in acute distress. Appearance: Normal appearance. He is not ill-appearing, toxic-appearing or diaphoretic. Comments: No diaphoresis noted. HENT:      Head: Normocephalic and atraumatic. Nose: Nose normal.   Eyes:      Pupils: Pupils are equal, round, and reactive to light. Cardiovascular:      Rate and Rhythm: Regular rhythm. Tachycardia present. Pulses: Normal pulses. Heart sounds: Normal heart sounds. No murmur. No friction rub. No gallop. Pulmonary:      Effort: Pulmonary effort is normal. No respiratory distress. Breath sounds: Normal breath sounds. No stridor. No wheezing, rhonchi or rales. Chest:      Chest wall: No tenderness. Abdominal:      General: There is no distension. Palpations: Abdomen is soft. There is no mass. Tenderness: There is no abdominal tenderness.  There is no right CVA tenderness, left CVA tenderness, guarding or rebound. Hernia: No hernia is present. Musculoskeletal:      Right lower leg: No edema. Left lower leg: No edema. Skin:     General: Skin is warm and dry. Neurological:      Mental Status: He is alert and oriented to person, place, and time. Mental status is at baseline. Comments: No tremor noted.          RECENT VITALS:  BP: (!) 160/104, Temp: 98.1 °F (36.7 °C), Pulse: 93,Resp: 20, SpO2: 98 %     DiagnosticResults     RADIOLOGY:  No orders to display       LABS:   Results for orders placed or performed during the hospital encounter of 09/10/20   CBC Auto Differential   Result Value Ref Range    WBC 6.9 4.0 - 11.0 K/uL    RBC 4.51 4.20 - 5.90 M/uL    Hemoglobin 15.3 13.5 - 17.5 g/dL    Hematocrit 45.0 40.5 - 52.5 %    MCV 99.8 80.0 - 100.0 fL    MCH 33.9 26.0 - 34.0 pg    MCHC 34.0 31.0 - 36.0 g/dL    RDW 15.2 12.4 - 15.4 %    Platelets 881 759 - 421 K/uL    MPV 6.8 5.0 - 10.5 fL    Neutrophils % 72.0 %    Lymphocytes % 13.3 %    Monocytes % 14.3 %    Eosinophils % 0.0 %    Basophils % 0.4 %    Neutrophils Absolute 5.0 1.7 - 7.7 K/uL    Lymphocytes Absolute 0.9 (L) 1.0 - 5.1 K/uL    Monocytes Absolute 1.0 0.0 - 1.3 K/uL    Eosinophils Absolute 0.0 0.0 - 0.6 K/uL    Basophils Absolute 0.0 0.0 - 0.2 K/uL   Basic Metabolic Panel w/ Reflex to MG   Result Value Ref Range    Sodium 132 (L) 136 - 145 mmol/L    Potassium reflex Magnesium 5.2 (H) 3.5 - 5.1 mmol/L    Chloride 85 (L) 99 - 110 mmol/L    CO2 14 (LL) 21 - 32 mmol/L    Anion Gap 33 (H) 3 - 16    Glucose 155 (H) 70 - 99 mg/dL    BUN 18 7 - 20 mg/dL    CREATININE 2.0 (H) 0.9 - 1.3 mg/dL    GFR Non- 37 (A) >60    GFR  45 (A) >60    Calcium 10.0 8.3 - 10.6 mg/dL   Hepatic Function Panel   Result Value Ref Range    Total Protein 9.3 (H) 6.4 - 8.2 g/dL    Alb 5.4 (H) 3.4 - 5.0 g/dL    Alkaline Phosphatase 97 40 - 129 U/L    ALT 71 (H) 10 - 40 U/L     (H) 15 - 37 U/L    Total Bilirubin 1.1 (H) 0.0 - 1.0 mg/dL    Bilirubin, Direct <0.2 0.0 - 0.3 mg/dL    Bilirubin, Indirect see below 0.0 - 1.0 mg/dL   Lipase   Result Value Ref Range    Lipase 64.0 (H) 13.0 - 60.0 U/L   Basic Metabolic Panel w/ Reflex to MG   Result Value Ref Range    Sodium 132 (L) 136 - 145 mmol/L    Potassium reflex Magnesium 4.7 3.5 - 5.1 mmol/L    Chloride 88 (L) 99 - 110 mmol/L    CO2 21 21 - 32 mmol/L    Anion Gap 23 (H) 3 - 16    Glucose 115 (H) 70 - 99 mg/dL    BUN 17 7 - 20 mg/dL    CREATININE 1.7 (H) 0.9 - 1.3 mg/dL    GFR Non-African American 45 (A) >60    GFR  55 (A) >60    Calcium 9.4 8.3 - 10.6 mg/dL         ED Course     Nursing Notes, Past Medical Hx, Past Surgical Hx, Social Hx, Allergies, and Family Hx were reviewed. The patient was given the followingmedications:  Orders Placed This Encounter   Medications    ondansetron (ZOFRAN) injection 4 mg    diazePAM (VALIUM) tablet 10 mg    lactated ringers infusion 1,000 mL    carvedilol (COREG) tablet 6.25 mg    sodium chloride 0.9 % 50 mL with folic acid 1 mg, adult multi-vitamin with vitamin k 10 mL, thiamine 100 mg    promethazine (PHENERGAN) injection 25 mg    lactated ringers infusion 1,000 mL    lactated ringers infusion     KELLY LOYA: cabinet override    aluminum & magnesium hydroxide-simethicone (MAALOX) 30 mL, lidocaine viscous hcl (XYLOCAINE) 5 mL (GI COCKTAIL)    carvedilol (COREG) tablet 6.25 mg       CONSULTS:  None    MEDICAL DECISION MAKING / ASSESSMENT / Yangluciana Felton is a 47 y.o. male with history of alcohol abuse presenting for alcohol withdrawal.  Patient history of symptoms similar to previous withdrawal in the past, with last drink 4 days ago, with no abdominal pain, and an unremarkable abdominal exam, as well as elevated blood pressure and tachycardia appear consistent with alcohol withdrawal with some degree of dehydration as he has not been able to tolerate anything by mouth the last 4 days.   Given the above, doubt any intra-abdominal pathology. However given patient does have history of alcohol abuse, nausea vomiting, we will evaluate with basic labs and lipase. Patient with blood pressure elevated here today, possibly due to withdrawal, however patient does have history of hypertension is supposed to be on Coreg twice a day but is not been able to takes medications for last 2 weeks given his alcohol abuse as well as his nausea vomiting. CIWA of 4, 4 days out from last drink and no hx of withdrawal seizures place this patient at low risk for one. Patient evaluated by social work and already established with rehab. Will provide symptomatic control with zofran, fluids, valium and reassess. Will provide dose of coreg here as well. Re-eval 7:28 PM  Patient had some improvmeent after zofran but nausea recurred, phenergan and GI cocktail given. Patient later feeling better and sleeping. BMP with very low bicarb and sodium, and LIZZETTE, pt likely dehydrated given lack of any PO in the last 4 days. Will provide 2L of fluids and repeat. Re-eval 7:28 PM  Patient with significant improvement of symptoms, no nausea or vomiting at this time. LIZZETTE and bicarb improved after fluids. Patient able to tolerate full meal with no symptoms. Patient sleeping in room. Patient with heart rate much improved, continues to be hypertensive but this has improved. Home Coreg given, although patient notes that he had a medication change since then and is taking another blood pressure medication. Advised patient follow-up with primary care provider regarding blood pressure control and follow-up with rehab regarding this process. Patient has Zofran, Librium, and blood pressure medications at home and does not need any refills. This patient was also evaluated by the attending physician. All care plans werediscussed and agreed upon. Clinical Impression     1.  Alcohol withdrawal syndrome without complication ([facility] [address]        Disposition PATIENT REFERRED TO:  No follow-up provider specified. DISCHARGE MEDICATIONS:  New Prescriptions    No medications on file     At this time, the patient was deemed appropriate for discharge. Discharge instructions, including strict return precautions for new or worsening symptoms concerning to the patient, were provided. All of his questions were answered satisfactorily, and he was subsequently sent home in stable condition. The patient is instructed to return to the emergency department should his symptoms worsen or any concern he believes warrants acute physician evaluation.     Chepe Gusman MD  Resident  09/10/20 [id number]       Nabeel Packer MD  Resident  09/10/20 [id number]

## 2020-09-10 NOTE — ED PROVIDER NOTES
ED Attending Attestation Note     Date of evaluation: 9/10/2020    This patient was seen by the resident. I have seen and examined the patient, agree with the workup, evaluation, management and diagnosis. The care plan has been discussed. My assessment reveals well-appearing male in no acute distress with a history of alcohol abuse previously in remission but started drinking again recently. Discussed with Kellen and he stopped drinking for the past 4 days but has had typical withdrawal symptoms such as nausea and vomiting. Here he is mildly tachycardic in the 110s is hypertensive but did not take his blood pressure medication this morning. He has no tremor otherwise is well-appearing. CIWA 4. Soft and benign abdominal examination.       Lynda Cox MD  09/10/20 [id number]

## 2020-09-10 NOTE — LETTER
Mirna Eden was seen and treated in our emergency department on 9/10/2020. He may return to work on 9/13    If you have any questions or concerns, please don't hesitate to call.       Evert Vides MD

## 2020-09-10 NOTE — ED TRIAGE NOTES
Pt here today for c/o alcohol withdraw Pt states that he has not had a drink since Monday. Pt states he is anxious nasueated. Denies pain.

## 2020-09-17 NOTE — PROGRESS NOTES
2020    Wayne Conley (:  1980) is a 47 y.o. male, here for evaluation of the following medical concerns:    Hypertension   This is a chronic problem. Episode onset: atleast 6 years? Progression since onset: BP normal today AM. Has been out of BP meds since tuesday. The problem is controlled. Associated symptoms include palpitations. Pertinent negatives include no chest pain, headaches or shortness of breath. (Occasional dizziness when he stands up to quick) Risk factors for coronary artery disease include male gender and smoking/tobacco exposure (vaping). Past treatments include lifestyle changes (Amlodipine: got edema, coreg: got CP? SOB? . Trying diet change, more turkey/fish, cutting out red meat). The current treatment provides significant (Currently on metoprolol XL 25 once daily. However ran out of meds on Tue. Today his BP has been normal for the visit. ) improvement. Compliance problems include medication cost (works for [employer], walks all day. Medication side effects as mentioned above). Alcohol Problem   Pertinent negatives include no weakness. Pertinent negatives include no fever, nausea or vomiting. [facility] for rehab, last visit 3 weeks, was helping. Trigger for drinking alcohol is driving, he knows where the alcohol stores are. He relapsed on labor day weekend, went to ER for acute alcohol withdrawal, wants to go back to [facility] set up for Monday? Last drink was , a whole bottle of vodka. Has not drank since. Sodium (mmol/L)   Date Value   09/10/2020 132 (L)    BUN (mg/dL)   Date Value   09/10/2020 17    Glucose (mg/dL)   Date Value   09/10/2020 115 (H)      Potassium reflex Magnesium (mmol/L)   Date Value   09/10/2020 4.7    CREATININE (mg/dL)   Date Value   09/10/2020 1.7 (H)         Review of Systems   Constitutional: Negative for chills, fatigue and fever.    Respiratory: Negative for cough, chest tightness and shortness of breath. Cardiovascular: Positive for palpitations. Negative for chest pain and leg swelling. Gastrointestinal: Negative for abdominal pain, constipation, diarrhea, nausea and vomiting. Genitourinary: Negative for difficulty urinating and dysuria. Neurological: Positive for light-headedness (when standing up too quickly). Negative for weakness and headaches. Prior to Visit Medications    Medication Sig Taking? Authorizing Provider   ondansetron (ZOFRAN ODT) 4 MG disintegrating tablet Take 1 tablet by mouth every 8 hours as needed for Nausea  Charm Babinski, MD   carvedilol (COREG) 6.25 MG tablet Take 6.25 mg by mouth 2 times daily (with meals)  Historical Provider, MD   famotidine (PEPCID) 20 MG tablet Take 1 tablet by mouth 2 times daily  Marcelina Rivero MD      Allergies   Allergen Reactions    Banana Other (See Comments)     Scratch throat    Tree Nut  [Macadamia Nut Oil] Other (See Comments)     Scratchy throat     Past Medical History:   Diagnosis Date    Hypertension      History reviewed. No pertinent surgical history.     Social History     Socioeconomic History    Marital status: Single     Spouse name: Not on file    Number of children: Not on file    Years of education: Not on file    Highest education level: Not on file   Occupational History    Not on file   Social Needs    Financial resource strain: Not on file    Food insecurity     Worry: Not on file     Inability: Not on file    Transportation needs     Medical: Not on file     Non-medical: Not on file   Tobacco Use    Smoking status: Current Every Day Smoker     Packs/day: 0.00     Types: E-Cigarettes    Smokeless tobacco: Never Used   Substance and Sexual Activity    Alcohol use: Yes     Comment: 1 fith /day    Drug use: Never    Sexual activity: Not on file   Lifestyle    Physical activity     Days per week: Not on file     Minutes per session: Not on file    Stress: Not on file   Relationships    Social connections Talks on phone: Not on file     Gets together: Not on file     Attends Worship service: Not on file     Active member of club or organization: Not on file     Attends meetings of clubs or organizations: Not on file     Relationship status: Not on file    Intimate partner violence     Fear of current or ex partner: Not on file     Emotionally abused: Not on file     Physically abused: Not on file     Forced sexual activity: Not on file   Other Topics Concern    Not on file   Social History Narrative    Not on file      History reviewed. No pertinent family history. Glaucoma: father  HTN: dad, brother   Father has colon cancer, dxed 79years of age. Now in remission    Vitals:    09/18/20 0822   BP: 122/82   Site: Right Upper Arm   Position: Sitting   Cuff Size: Medium Adult   Pulse: 108   Resp: 18   Temp: 97.2 °F (36.2 °C)   TempSrc: Oral   SpO2: 97%   Weight: 206 lb (93.4 kg)     Estimated body mass index is 30.42 kg/m² as calculated from the following:    Height as of 9/10/20: 5' 9\" (1.753 m). Weight as of this encounter: 206 lb (93.4 kg). Physical Exam  Constitutional:       General: He is not in acute distress. Appearance: Normal appearance. He is normal weight. He is not ill-appearing or diaphoretic. Eyes:      Pupils: Pupils are equal, round, and reactive to light. Cardiovascular:      Rate and Rhythm: Regular rhythm. Tachycardia present. Pulses: Normal pulses. Heart sounds: Normal heart sounds. No murmur. Pulmonary:      Effort: Pulmonary effort is normal. No respiratory distress. Breath sounds: Normal breath sounds. Abdominal:      General: Abdomen is flat. Bowel sounds are normal.      Palpations: Abdomen is soft. Tenderness: There is no abdominal tenderness. There is no guarding. Musculoskeletal: Normal range of motion. General: No swelling. Neurological:      General: No focal deficit present. Mental Status: He is alert.    Psychiatric: Mood and Affect: Mood normal.         Behavior: Behavior normal.         Thought Content: Thought content normal.         Judgment: Judgment normal.       ASSESSMENT/PLAN:  1. Hypertension, unspecified type  - Comprehensive Metabolic Panel, Fasting; Future  - Lipid, Fasting; Future  - CBC WITH AUTO DIFFERENTIAL; Future  - Today AM his /82 left arm, 122/82 right arm with manual cuff  - previously on metoprolol XL 25, ran out on 09/14  - Will see patient back in 7 days after home BP measurement    2. Alcohol abuse  - Relapsed on labor day weekend, went to ED on 09/10  - Previously seen at [facility] which has helped him. Now planning to set up appointment for Monday    3. FHx of colon cancer  - Dad dxed with colon cancer when he was 79. Does not require a colonoscopy for now. - pt was requesting colonoscopy, can recommend FIT test and if positive we can further work it up. Will discuss in the next visit. Return in about 1 week (around 9/25/2020). An  electronic signature was used to authenticate this note.     --Ramone Egan MD on 9/18/2020 at 9:06 AM

## 2020-09-18 ENCOUNTER — OFFICE VISIT (OUTPATIENT)
Dept: INTERNAL MEDICINE CLINIC | Age: 40
End: 2020-09-18
Payer: MEDICAID

## 2020-09-18 VITALS
HEART RATE: 108 BPM | DIASTOLIC BLOOD PRESSURE: 82 MMHG | BODY MASS INDEX: 30.42 KG/M2 | WEIGHT: 206 LBS | OXYGEN SATURATION: 97 % | RESPIRATION RATE: 18 BRPM | SYSTOLIC BLOOD PRESSURE: 122 MMHG | TEMPERATURE: 97.2 F

## 2020-09-18 PROBLEM — F10.10 ALCOHOL ABUSE: Status: ACTIVE | Noted: 2020-09-18

## 2020-09-18 PROCEDURE — 99213 OFFICE O/P EST LOW 20 MIN: CPT | Performed by: STUDENT IN AN ORGANIZED HEALTH CARE EDUCATION/TRAINING PROGRAM

## 2020-09-18 ASSESSMENT — ENCOUNTER SYMPTOMS
SHORTNESS OF BREATH: 0
ABDOMINAL PAIN: 0
CONSTIPATION: 0
VOMITING: 0
COUGH: 0
DIARRHEA: 0
NAUSEA: 0
CHEST TIGHTNESS: 0

## 2020-09-18 ASSESSMENT — PATIENT HEALTH QUESTIONNAIRE - PHQ9
SUM OF ALL RESPONSES TO PHQ9 QUESTIONS 1 & 2: 0
SUM OF ALL RESPONSES TO PHQ QUESTIONS 1-9: 0
1. LITTLE INTEREST OR PLEASURE IN DOING THINGS: 0
SUM OF ALL RESPONSES TO PHQ QUESTIONS 1-9: 0
2. FEELING DOWN, DEPRESSED OR HOPELESS: 0

## 2020-09-18 NOTE — PATIENT INSTRUCTIONS
Measure BP at home everyday, exact same time of the day, record it bring in with visit next week    Labs before next visit  Return in 1 week.

## 2020-09-29 ENCOUNTER — OFFICE VISIT (OUTPATIENT)
Dept: INTERNAL MEDICINE CLINIC | Age: 40
End: 2020-09-29
Payer: MEDICAID

## 2020-09-29 VITALS
DIASTOLIC BLOOD PRESSURE: 93 MMHG | BODY MASS INDEX: 29.62 KG/M2 | SYSTOLIC BLOOD PRESSURE: 131 MMHG | WEIGHT: 200 LBS | HEIGHT: 69 IN | HEART RATE: 118 BPM | TEMPERATURE: 97.3 F | OXYGEN SATURATION: 98 % | RESPIRATION RATE: 20 BRPM

## 2020-09-29 PROCEDURE — 99213 OFFICE O/P EST LOW 20 MIN: CPT | Performed by: STUDENT IN AN ORGANIZED HEALTH CARE EDUCATION/TRAINING PROGRAM

## 2020-09-29 ASSESSMENT — ENCOUNTER SYMPTOMS
NAUSEA: 0
CONSTIPATION: 0
DIARRHEA: 0
ABDOMINAL PAIN: 0
SHORTNESS OF BREATH: 0
VOMITING: 0
COUGH: 0
CHEST TIGHTNESS: 0

## 2020-09-29 NOTE — PROGRESS NOTES
2020    Tellis Bloch (:  1980) is a 47 y.o. male, here for evaluation of the following medical concerns:    Hypertension and tachycardia     Mr. Dean Bass, 46 yo M with PMHx alcohol abuse disorder, presents requesting refill for metoprolol XL 25 mg PO qD which he has been taking daily for HTN and tachycardia. He complains of intermittent palpitations and states that his last drink was on  when he found a bottle of vodka while cleaning out his house. He currently follows with [address] [place] [address] and he states that he has a good support system there. Denies prior history of seizure or hematemesis. Sodium (mmol/L)   Date Value   09/10/2020 132 (L)    BUN (mg/dL)   Date Value   09/10/2020 17    Glucose (mg/dL)   Date Value   09/10/2020 115 (H)      Potassium reflex Magnesium (mmol/L)   Date Value   09/10/2020 4.7    CREATININE (mg/dL)   Date Value   09/10/2020 1.7 (H)         Review of Systems   Constitutional: Negative for chills, fatigue and fever. Respiratory: Negative for cough, chest tightness and shortness of breath. Cardiovascular: Positive for palpitations. Negative for chest pain and leg swelling. Gastrointestinal: Negative for abdominal pain, constipation, diarrhea, nausea and vomiting. Genitourinary: Negative for difficulty urinating and dysuria. Neurological: Negative for weakness, light-headedness (when standing up too quickly) and headaches. Prior to Visit Medications    Medication Sig Taking?  Authorizing Provider   metoprolol tartrate (LOPRESSOR) 25 MG tablet Take 0.5 tablets by mouth daily Yes Joseluis Miller MD      Allergies   Allergen Reactions    Amlodipine Swelling     angioedema    Banana Other (See Comments)     Scratch throat    Carvedilol Other (See Comments)     chest pain    Tree Nut  [Macadamia Nut Oil] Other (See Comments)     Scratchy throat     Past Medical History:   Diagnosis Date    Hypertension      No past surgical history on file. Social History     Socioeconomic History    Marital status: Single     Spouse name: Not on file    Number of children: Not on file    Years of education: Not on file    Highest education level: Not on file   Occupational History    Not on file   Social Needs    Financial resource strain: Not on file    Food insecurity     Worry: Not on file     Inability: Not on file    Transportation needs     Medical: Not on file     Non-medical: Not on file   Tobacco Use    Smoking status: Current Every Day Smoker     Packs/day: 0.00     Types: E-Cigarettes    Smokeless tobacco: Never Used   Substance and Sexual Activity    Alcohol use: Yes     Comment: 1 fith /day    Drug use: Never    Sexual activity: Not on file   Lifestyle    Physical activity     Days per week: Not on file     Minutes per session: Not on file    Stress: Not on file   Relationships    Social connections     Talks on phone: Not on file     Gets together: Not on file     Attends Roman [employer] service: Not on file     Active member of club or organization: Not on file     Attends meetings of clubs or organizations: Not on file     Relationship status: Not on file    Intimate partner violence     Fear of current or ex partner: Not on file     Emotionally abused: Not on file     Physically abused: Not on file     Forced sexual activity: Not on file   Other Topics Concern    Not on file   Social History Narrative    Not on file      Family History   Problem Relation Age of Onset    Glaucoma Father     Colon Cancer Father      Glaucoma: father  HTN: dad, brother   Father has colon cancer, dxed 79years of age.  Now in remission    Vitals:    09/29/20 1515   BP: (!) 131/93   Site: Left Upper Arm   Position: Sitting   Cuff Size: Large Adult   Pulse: 118   Resp: 20   Temp: 97.3 °F (36.3 °C)   TempSrc: Oral   SpO2: 98%   Weight: 200 lb (90.7 kg)   Height: 5' 9\" (1.753 m)     Estimated body mass index is 29.53 kg/m² as calculated from the following:    Height as of this encounter: 5' 9\" (1.753 m). Weight as of this encounter: 200 lb (90.7 kg). Physical Exam  Constitutional:       General: He is not in acute distress. Appearance: Normal appearance. He is normal weight. He is not ill-appearing or diaphoretic. Eyes:      Pupils: Pupils are equal, round, and reactive to light. Cardiovascular:      Rate and Rhythm: Regular rhythm. Tachycardia present. Pulses: Normal pulses. Heart sounds: Normal heart sounds. No murmur. Pulmonary:      Effort: Pulmonary effort is normal. No respiratory distress. Breath sounds: Normal breath sounds. Abdominal:      General: Abdomen is flat. Bowel sounds are normal.      Palpations: Abdomen is soft. Tenderness: There is no abdominal tenderness. There is no guarding. Musculoskeletal: Normal range of motion. General: No swelling. Neurological:      General: No focal deficit present. Mental Status: He is alert. Psychiatric:         Mood and Affect: Mood normal.         Behavior: Behavior normal.         Thought Content: Thought content normal.         Judgment: Judgment normal.       ASSESSMENT/PLAN:    1. Hypertension, unspecified type  - resume on low dose beta-blocker for tachycardia   - f/u 6 months   - labs reviewed with patient, f/u in 6 months for liver enzyme follow-up    2. Alcohol abuse  - Relapsed on labor day weekend, went to ED on 09/10  - follows with [address] [place] [address] rehab clinic outpatient  -counseling and support provided    3. FHx of colon cancer  - Dad dxed with colon cancer when he was 79.   - discussed starting colon cancer screening at age 38. Return in about 4 months (around 1/29/2021). An  electronic signature was used to authenticate this note.     --Dane Arceo MD on 3/35/1373 at 4:55 PM

## 2020-12-02 ENCOUNTER — HOSPITAL ENCOUNTER (EMERGENCY)
Age: 40
Discharge: HOME | End: 2020-12-02
Attending: EMERGENCY MEDICINE
Payer: MEDICAID

## 2020-12-02 VITALS
OXYGEN SATURATION: 95 % | HEART RATE: 116 BPM | TEMPERATURE: 98.5 F | RESPIRATION RATE: 17 BRPM | DIASTOLIC BLOOD PRESSURE: 95 MMHG | SYSTOLIC BLOOD PRESSURE: 138 MMHG

## 2020-12-02 LAB
A/G RATIO: 1.5 (ref 1.1–2.2)
ALBUMIN SERPL-MCNC: 4.8 G/DL (ref 3.4–5)
ALP BLD-CCNC: 91 U/L (ref 40–129)
ALT SERPL-CCNC: 189 U/L (ref 10–40)
ANION GAP SERPL CALCULATED.3IONS-SCNC: 23 MMOL/L (ref 3–16)
AST SERPL-CCNC: 238 U/L (ref 15–37)
BASOPHILS ABSOLUTE: 0 K/UL (ref 0–0.2)
BASOPHILS RELATIVE PERCENT: 0.9 %
BILIRUB SERPL-MCNC: 0.3 MG/DL (ref 0–1)
BUN BLDV-MCNC: 15 MG/DL (ref 7–20)
CALCIUM SERPL-MCNC: 9.5 MG/DL (ref 8.3–10.6)
CHLORIDE BLD-SCNC: 95 MMOL/L (ref 99–110)
CO2: 22 MMOL/L (ref 21–32)
CREAT SERPL-MCNC: 1.1 MG/DL (ref 0.9–1.3)
EOSINOPHILS ABSOLUTE: 0.1 K/UL (ref 0–0.6)
EOSINOPHILS RELATIVE PERCENT: 1.8 %
ETHANOL: 334 MG/DL (ref 0–0.08)
GFR AFRICAN AMERICAN: >60
GFR NON-AFRICAN AMERICAN: >60
GLOBULIN: 3.3 G/DL
GLUCOSE BLD-MCNC: 101 MG/DL (ref 70–99)
HCT VFR BLD CALC: 42.2 % (ref 40.5–52.5)
HEMOGLOBIN: 14 G/DL (ref 13.5–17.5)
LIPASE: 41 U/L (ref 13–60)
LYMPHOCYTES ABSOLUTE: 1.8 K/UL (ref 1–5.1)
LYMPHOCYTES RELATIVE PERCENT: 38.1 %
MCH RBC QN AUTO: 33.5 PG (ref 26–34)
MCHC RBC AUTO-ENTMCNC: 33.2 G/DL (ref 31–36)
MCV RBC AUTO: 101.1 FL (ref 80–100)
MONOCYTES ABSOLUTE: 0.4 K/UL (ref 0–1.3)
MONOCYTES RELATIVE PERCENT: 8.8 %
NEUTROPHILS ABSOLUTE: 2.4 K/UL (ref 1.7–7.7)
NEUTROPHILS RELATIVE PERCENT: 50.4 %
PDW BLD-RTO: 15 % (ref 12.4–15.4)
PLATELET # BLD: 268 K/UL (ref 135–450)
PMV BLD AUTO: 6.8 FL (ref 5–10.5)
POTASSIUM REFLEX MAGNESIUM: 4 MMOL/L (ref 3.5–5.1)
RBC # BLD: 4.17 M/UL (ref 4.2–5.9)
SODIUM BLD-SCNC: 140 MMOL/L (ref 136–145)
TOTAL PROTEIN: 8.1 G/DL (ref 6.4–8.2)
WBC # BLD: 4.7 K/UL (ref 4–11)

## 2020-12-02 PROCEDURE — 83690 ASSAY OF LIPASE: CPT

## 2020-12-02 PROCEDURE — 2580000003 HC RX 258: Performed by: STUDENT IN AN ORGANIZED HEALTH CARE EDUCATION/TRAINING PROGRAM

## 2020-12-02 PROCEDURE — 80053 COMPREHEN METABOLIC PANEL: CPT

## 2020-12-02 PROCEDURE — G0480 DRUG TEST DEF 1-7 CLASSES: HCPCS

## 2020-12-02 PROCEDURE — 6370000000 HC RX 637 (ALT 250 FOR IP): Performed by: STUDENT IN AN ORGANIZED HEALTH CARE EDUCATION/TRAINING PROGRAM

## 2020-12-02 PROCEDURE — 85025 COMPLETE CBC W/AUTO DIFF WBC: CPT

## 2020-12-02 PROCEDURE — 99283 EMERGENCY DEPT VISIT LOW MDM: CPT

## 2020-12-02 PROCEDURE — 36415 COLL VENOUS BLD VENIPUNCTURE: CPT

## 2020-12-02 RX ORDER — MULTIVITAMIN WITH IRON
1 TABLET ORAL ONCE
Status: COMPLETED | OUTPATIENT
Start: 2020-12-02 | End: 2020-12-02

## 2020-12-02 RX ORDER — MULTIVITAMIN WITH IRON
1 TABLET ORAL DAILY
Status: DISCONTINUED | OUTPATIENT
Start: 2020-12-02 | End: 2020-12-02

## 2020-12-02 RX ORDER — DIAZEPAM 10 MG/1
10 TABLET ORAL ONCE
Status: COMPLETED | OUTPATIENT
Start: 2020-12-02 | End: 2020-12-02

## 2020-12-02 RX ORDER — SODIUM CHLORIDE, SODIUM LACTATE, POTASSIUM CHLORIDE, AND CALCIUM CHLORIDE .6; .31; .03; .02 G/100ML; G/100ML; G/100ML; G/100ML
1000 INJECTION, SOLUTION INTRAVENOUS ONCE
Status: COMPLETED | OUTPATIENT
Start: 2020-12-02 | End: 2020-12-02

## 2020-12-02 RX ORDER — SODIUM CHLORIDE 0.9 % (FLUSH) 0.9 %
10 SYRINGE (ML) INJECTION EVERY 12 HOURS SCHEDULED
Status: DISCONTINUED | OUTPATIENT
Start: 2020-12-02 | End: 2020-12-02 | Stop reason: HOSPADM

## 2020-12-02 RX ORDER — THIAMINE MONONITRATE (VIT B1) 100 MG
100 TABLET ORAL ONCE
Status: COMPLETED | OUTPATIENT
Start: 2020-12-02 | End: 2020-12-02

## 2020-12-02 RX ORDER — FOLIC ACID 1 MG/1
1 TABLET ORAL DAILY
Status: DISCONTINUED | OUTPATIENT
Start: 2020-12-02 | End: 2020-12-02

## 2020-12-02 RX ORDER — FOLIC ACID 1 MG/1
1 TABLET ORAL ONCE
Status: COMPLETED | OUTPATIENT
Start: 2020-12-02 | End: 2020-12-02

## 2020-12-02 RX ORDER — SODIUM CHLORIDE 0.9 % (FLUSH) 0.9 %
10 SYRINGE (ML) INJECTION PRN
Status: DISCONTINUED | OUTPATIENT
Start: 2020-12-02 | End: 2020-12-02 | Stop reason: HOSPADM

## 2020-12-02 RX ORDER — THIAMINE MONONITRATE (VIT B1) 100 MG
100 TABLET ORAL DAILY
Status: DISCONTINUED | OUTPATIENT
Start: 2020-12-02 | End: 2020-12-02

## 2020-12-02 RX ADMIN — SODIUM CHLORIDE, SODIUM LACTATE, POTASSIUM CHLORIDE, AND CALCIUM CHLORIDE 1000 ML: 600; 310; 30; 20 INJECTION, SOLUTION INTRAVENOUS at 18:25

## 2020-12-02 RX ADMIN — FOLIC ACID 1 MG: 1 TABLET ORAL at 18:04

## 2020-12-02 RX ADMIN — THERA TABS 1 TABLET: TAB at 19:14

## 2020-12-02 RX ADMIN — THIAMINE HCL TAB 100 MG 100 MG: 100 TAB at 18:04

## 2020-12-02 RX ADMIN — DIAZEPAM 10 MG: 10 TABLET ORAL at 18:13

## 2020-12-02 NOTE — ED TRIAGE NOTES
Pt comes in today with needing help alcohol abuse. Pt states that he has been drinking a fifth a day for about a month.  Pt is following up with [facility] but has to detox first.

## 2020-12-02 NOTE — ED PROVIDER NOTES
ED Attending Attestation Note     Date of evaluation: 12/2/2020    This patient was seen by the resident. I have seen and examined the patient, agree with the workup, evaluation, management and diagnosis. The care plan has been discussed. I was present for any procedures performed in the resident's  note and have made edits to the note where appropriate. My assessment reveals 47 y.o. male with history of alcohol abuse presenting for concern for dehydration in the setting of possible early withdrawal.  Patient has been drinking up until shortly before arrival.  Has reduced his drinking and feels very mildly anxious and has a very fine tremor. CIWA is 2. He is otherwise well-appearing and tolerates p.o. intake without difficulty. He was given thiamine, folate, multivitamin, and IV fluids. He does have a mild anion gap but no significant acidosis. Given that this is likely from poor nutrition and mild ketosis, and that this appears chronic for him, suspect he will be able to manage this as an outpatient given that he is tolerating p.o. intake. He was given a single dose of Valium in the ED with improvement in his mild withdrawal.  He has Valium at home from a prior detox treatment and refuses a new prescription. He has follow-up with [facility] for ongoing alcohol rehabilitation and I do feel he is safe for outpatient detox. He has done so successfully in the past as well. Denies suicidal or homicidal ideation. No evidence of DTs. Discharged in stable condition with written and verbal instructions for which to return to the ED.          Fernando Arellano MD  12/02/20 Gloria Farmer

## 2020-12-02 NOTE — ED PROVIDER NOTES
98.5 °F (36.9 °C), Pulse: 116, Resp: 17, SpO2: 95 %     Physical Examination:   · General appearance: No apparent distress, appears stated age and cooperative. · HEENT: Pupils equal, round, and reactive to light. Conjunctivae/corneas clear. · Neck: Supple, with full range of motion. No jugular venous distention. Trachea midline. · Respiratory:  Normal respiratory effort. Clear to auscultation, bilaterally without Rales/Wheezes/Rhonchi. · Cardiovascular: Tachycardia. with normal S1/S2 without murmurs, rubs or gallops. · Abdomen: Soft, non-tender, non-distended with normal bowel sounds. · Musculoskeletal: No clubbing, cyanosis or edema bilaterally. Full range of motion without deformity. · Skin: Skin color, texture, turgor normal.  No rashes or lesions. · Neurologic:  Neurovascularly intact without any focal sensory/motor deficits.  Cranial nerves: II-XII intact, grossly non-focal.  · Psychiatric: Alert and oriented, thought content appropriate, normal insight  · Capillary Refill: Brisk,< 3 seconds   · Peripheral Pulses: +2 palpable, equal bilaterally     Diagnostic Results         RADIOLOGY:  No orders to display       LABS:   Results for orders placed or performed during the hospital encounter of 12/02/20   CBC Auto Differential   Result Value Ref Range    WBC 4.7 4.0 - 11.0 K/uL    RBC 4.17 (L) 4.20 - 5.90 M/uL    Hemoglobin 14.0 13.5 - 17.5 g/dL    Hematocrit 42.2 40.5 - 52.5 %    .1 (H) 80.0 - 100.0 fL    MCH 33.5 26.0 - 34.0 pg    MCHC 33.2 31.0 - 36.0 g/dL    RDW 15.0 12.4 - 15.4 %    Platelets 121 876 - 138 K/uL    MPV 6.8 5.0 - 10.5 fL    Neutrophils % 50.4 %    Lymphocytes % 38.1 %    Monocytes % 8.8 %    Eosinophils % 1.8 %    Basophils % 0.9 %    Neutrophils Absolute 2.4 1.7 - 7.7 K/uL    Lymphocytes Absolute 1.8 1.0 - 5.1 K/uL    Monocytes Absolute 0.4 0.0 - 1.3 K/uL    Eosinophils Absolute 0.1 0.0 - 0.6 K/uL    Basophils Absolute 0.0 0.0 - 0.2 K/uL   Comprehensive Metabolic Panel w/ Reflex to MG   Result Value Ref Range    Sodium 140 136 - 145 mmol/L    Potassium reflex Magnesium 4.0 3.5 - 5.1 mmol/L    Chloride 95 (L) 99 - 110 mmol/L    CO2 22 21 - 32 mmol/L    Anion Gap 23 (H) 3 - 16    Glucose 101 (H) 70 - 99 mg/dL    BUN 15 7 - 20 mg/dL    CREATININE 1.1 0.9 - 1.3 mg/dL    GFR Non-African American >60 >60    GFR African American >60 >60    Calcium 9.5 8.3 - 10.6 mg/dL    Total Protein 8.1 6.4 - 8.2 g/dL    Alb 4.8 3.4 - 5.0 g/dL    Albumin/Globulin Ratio 1.5 1.1 - 2.2    Total Bilirubin 0.3 0.0 - 1.0 mg/dL    Alkaline Phosphatase 91 40 - 129 U/L     (H) 10 - 40 U/L     (H) 15 - 37 U/L    Globulin 3.3 g/dL   Ethanol   Result Value Ref Range    Ethanol Lvl 334 mg/dL   Lipase   Result Value Ref Range    Lipase 41.0 13.0 - 60.0 U/L           RECENT VITALS:  BP: (!) 138/95, Temp: 98.5 °F (36.9 °C),Pulse: 116, Resp: 17, SpO2: 95 %         ED Course     Nursing Notes, Past Medical Hx, Past Surgical Hx, Social Hx, Allergies, and FamilyHx were reviewed. The patient was giventhe following medications:  Orders Placed This Encounter   Medications    sodium chloride flush 0.9 % injection 10 mL    sodium chloride flush 0.9 % injection 10 mL    lactated ringers bolus    DISCONTD: vitamin B-1 (THIAMINE) tablet 100 mg    DISCONTD: multivitamin 1 tablet    DISCONTD: folic acid (FOLVITE) tablet 1 mg    folic acid (FOLVITE) tablet 1 mg    multivitamin 1 tablet    vitamin B-1 (THIAMINE) tablet 100 mg    diazePAM (VALIUM) tablet 10 mg       CONSULTS:  None    MEDICAL DECISION MAKING / ASSESSMENT / Zana Pricilla is a 47 y.o. male who presents with alcohol problems. Administered a bolus of 1 L of LR solution, thiamine, folate, multivitamin tablet, and Valium 10 mg tablet. Patient's labs revealed an elevated anion gap of 23 which was seen on previous labs and is likely due to ketones from inadequate p.o. intake. Gave patient crackers and he was able to tolerate well.   Decision to discharge the patient was made. Instructed patient to follow-up with his PCP as soon as possible. Patient stated that he has Valium at home which was prescribed to him when he had his last alcohol detox. Instructed patient to take his Valium up to 3 times a day for withdrawal sxs and return to the ED for any new or worsening symptoms. This patient was also evaluated by the attending physician. All care plans were discussed and agreed upon. Clinical Impression     1.  Alcohol abuse        Disposition     PATIENT REFERRED TO:  Funmilayo Quinn MD  [address] [place] [address] No. [facility]  [phone]    Call today  For hospital visit follow up as soon as possible      DISCHARGE MEDICATIONS:  New Prescriptions    No medications on file       DISPOSITION Decision To Discharge 12/02/2020 07:04:31 PM      Heather Wright MD  Resident  12/02/20 Julián Wilkins MD  Resident  12/02/20 [id number]

## 2020-12-03 NOTE — ED NOTES
Patient prepared for and ready to be discharged. Patient discharged at this time in no acute distress after verbalizing understanding of discharge instructions. Patient left after receiving After Visit Summary instructions.         Tuan Morley RN  12/02/20 3425

## 2020-12-24 ENCOUNTER — HOSPITAL ENCOUNTER (INPATIENT)
Age: 40
LOS: 1 days | Discharge: HOME | End: 2020-12-25
Attending: EMERGENCY MEDICINE | Admitting: INTERNAL MEDICINE
Payer: MEDICAID

## 2020-12-24 PROBLEM — F10.139 ALCOHOL ABUSE WITH WITHDRAWAL (HCC): Status: ACTIVE | Noted: 2020-12-24

## 2020-12-24 LAB
ALBUMIN SERPL-MCNC: 5.4 G/DL (ref 3.4–5)
ALP BLD-CCNC: 100 U/L (ref 40–129)
ALT SERPL-CCNC: 108 U/L (ref 10–40)
ANION GAP SERPL CALCULATED.3IONS-SCNC: 25 MMOL/L (ref 3–16)
ANION GAP SERPL CALCULATED.3IONS-SCNC: 37 MMOL/L (ref 3–16)
ANION GAP SERPL CALCULATED.3IONS-SCNC: 39 MMOL/L (ref 3–16)
AST SERPL-CCNC: 161 U/L (ref 15–37)
BASE EXCESS VENOUS: -15.8 MMOL/L (ref -2–3)
BASE EXCESS VENOUS: -4.2 MMOL/L (ref -2–3)
BASOPHILS ABSOLUTE: 0 K/UL (ref 0–0.2)
BASOPHILS RELATIVE PERCENT: 0.4 %
BILIRUB SERPL-MCNC: 0.8 MG/DL (ref 0–1)
BILIRUBIN DIRECT: <0.2 MG/DL (ref 0–0.3)
BILIRUBIN URINE: NEGATIVE
BILIRUBIN, INDIRECT: ABNORMAL MG/DL (ref 0–1)
BLOOD, URINE: NEGATIVE
BUN BLDV-MCNC: 17 MG/DL (ref 7–20)
BUN BLDV-MCNC: 17 MG/DL (ref 7–20)
BUN BLDV-MCNC: 18 MG/DL (ref 7–20)
CALCIUM SERPL-MCNC: 9.5 MG/DL (ref 8.3–10.6)
CALCIUM SERPL-MCNC: 9.6 MG/DL (ref 8.3–10.6)
CALCIUM SERPL-MCNC: 9.6 MG/DL (ref 8.3–10.6)
CARBOXYHEMOGLOBIN: 1.9 % (ref 0–1.5)
CARBOXYHEMOGLOBIN: 2 % (ref 0–1.5)
CHLORIDE BLD-SCNC: 88 MMOL/L (ref 99–110)
CHLORIDE BLD-SCNC: 88 MMOL/L (ref 99–110)
CHLORIDE BLD-SCNC: 90 MMOL/L (ref 99–110)
CHP ED QC CHECK: YES
CHP ED QC CHECK: YES
CLARITY: CLEAR
CO2: 10 MMOL/L (ref 21–32)
CO2: 11 MMOL/L (ref 21–32)
CO2: 20 MMOL/L (ref 21–32)
COLOR: YELLOW
CREAT SERPL-MCNC: 1.4 MG/DL (ref 0.9–1.3)
CREAT SERPL-MCNC: 1.5 MG/DL (ref 0.9–1.3)
CREAT SERPL-MCNC: 1.5 MG/DL (ref 0.9–1.3)
EKG ATRIAL RATE: 109 BPM
EKG DIAGNOSIS: NORMAL
EKG P AXIS: 58 DEGREES
EKG P-R INTERVAL: 146 MS
EKG Q-T INTERVAL: 364 MS
EKG QRS DURATION: 80 MS
EKG QTC CALCULATION (BAZETT): 490 MS
EKG R AXIS: 67 DEGREES
EKG T AXIS: 52 DEGREES
EKG VENTRICULAR RATE: 109 BPM
EOSINOPHILS ABSOLUTE: 0 K/UL (ref 0–0.6)
EOSINOPHILS RELATIVE PERCENT: 0 %
GFR AFRICAN AMERICAN: >60
GFR NON-AFRICAN AMERICAN: 52
GFR NON-AFRICAN AMERICAN: 52
GFR NON-AFRICAN AMERICAN: 56
GLUCOSE BLD-MCNC: 103 MG/DL
GLUCOSE BLD-MCNC: 103 MG/DL (ref 70–99)
GLUCOSE BLD-MCNC: 172 MG/DL (ref 70–99)
GLUCOSE BLD-MCNC: 252 MG/DL
GLUCOSE BLD-MCNC: 252 MG/DL (ref 70–99)
GLUCOSE BLD-MCNC: 92 MG/DL (ref 70–99)
GLUCOSE BLD-MCNC: 96 MG/DL (ref 70–99)
GLUCOSE URINE: 100 MG/DL
HCO3 VENOUS: 11 MMOL/L (ref 24–28)
HCO3 VENOUS: 20.6 MMOL/L (ref 24–28)
HCT VFR BLD CALC: 42.5 % (ref 40.5–52.5)
HEMOGLOBIN, VEN, REDUCED: 26.4 %
HEMOGLOBIN, VEN, REDUCED: 8.5 %
HEMOGLOBIN: 14.1 G/DL (ref 13.5–17.5)
KETONES, URINE: 40 MG/DL
LACTIC ACID: 3.9 MMOL/L (ref 0.4–2)
LACTIC ACID: 4.5 MMOL/L (ref 0.4–2)
LEUKOCYTE ESTERASE, URINE: NEGATIVE
LIPASE: 32 U/L (ref 13–60)
LYMPHOCYTES ABSOLUTE: 0.5 K/UL (ref 1–5.1)
LYMPHOCYTES RELATIVE PERCENT: 7.4 %
MCH RBC QN AUTO: 33.9 PG (ref 26–34)
MCHC RBC AUTO-ENTMCNC: 33.3 G/DL (ref 31–36)
MCV RBC AUTO: 102 FL (ref 80–100)
METHEMOGLOBIN VENOUS: 0.7 % (ref 0–1.5)
METHEMOGLOBIN VENOUS: 0.8 % (ref 0–1.5)
MICROSCOPIC EXAMINATION: ABNORMAL
MONOCYTES ABSOLUTE: 0.4 K/UL (ref 0–1.3)
MONOCYTES RELATIVE PERCENT: 6.1 %
NEUTROPHILS ABSOLUTE: 6.2 K/UL (ref 1.7–7.7)
NEUTROPHILS RELATIVE PERCENT: 86.1 %
NITRITE, URINE: NEGATIVE
O2 SAT, VEN: 73 %
O2 SAT, VEN: 91 %
PCO2, VEN: 28.8 MMHG (ref 41–51)
PCO2, VEN: 39 MMHG (ref 41–51)
PDW BLD-RTO: 14.7 % (ref 12.4–15.4)
PERFORMED ON: ABNORMAL
PERFORMED ON: ABNORMAL
PH UA: 6 (ref 5–8)
PH VENOUS: 7.21 (ref 7.35–7.45)
PH VENOUS: 7.34 (ref 7.35–7.45)
PLATELET # BLD: 296 K/UL (ref 135–450)
PMV BLD AUTO: 6.9 FL (ref 5–10.5)
PO2, VEN: 44.1 MMHG (ref 25–40)
PO2, VEN: 75.6 MMHG (ref 25–40)
POTASSIUM REFLEX MAGNESIUM: 4.6 MMOL/L (ref 3.5–5.1)
POTASSIUM REFLEX MAGNESIUM: 5.5 MMOL/L (ref 3.5–5.1)
POTASSIUM REFLEX MAGNESIUM: 5.6 MMOL/L (ref 3.5–5.1)
PROTEIN UA: NEGATIVE MG/DL
RBC # BLD: 4.17 M/UL (ref 4.2–5.9)
SODIUM BLD-SCNC: 133 MMOL/L (ref 136–145)
SODIUM BLD-SCNC: 136 MMOL/L (ref 136–145)
SODIUM BLD-SCNC: 139 MMOL/L (ref 136–145)
SPECIFIC GRAVITY UA: 1.02 (ref 1–1.03)
TCO2 CALC VENOUS: 12 MMOL/L
TCO2 CALC VENOUS: 22 MMOL/L
TOTAL PROTEIN: 9.1 G/DL (ref 6.4–8.2)
URINE REFLEX TO CULTURE: ABNORMAL
URINE TYPE: ABNORMAL
UROBILINOGEN, URINE: 0.2 E.U./DL
WBC # BLD: 7.2 K/UL (ref 4–11)

## 2020-12-24 PROCEDURE — 83690 ASSAY OF LIPASE: CPT

## 2020-12-24 PROCEDURE — 93005 ELECTROCARDIOGRAM TRACING: CPT | Performed by: PHYSICIAN ASSISTANT

## 2020-12-24 PROCEDURE — 81003 URINALYSIS AUTO W/O SCOPE: CPT

## 2020-12-24 PROCEDURE — 6370000000 HC RX 637 (ALT 250 FOR IP): Performed by: PHYSICIAN ASSISTANT

## 2020-12-24 PROCEDURE — 2500000003 HC RX 250 WO HCPCS: Performed by: STUDENT IN AN ORGANIZED HEALTH CARE EDUCATION/TRAINING PROGRAM

## 2020-12-24 PROCEDURE — 6370000000 HC RX 637 (ALT 250 FOR IP): Performed by: STUDENT IN AN ORGANIZED HEALTH CARE EDUCATION/TRAINING PROGRAM

## 2020-12-24 PROCEDURE — 80076 HEPATIC FUNCTION PANEL: CPT

## 2020-12-24 PROCEDURE — 1200000000 HC SEMI PRIVATE

## 2020-12-24 PROCEDURE — 85025 COMPLETE CBC W/AUTO DIFF WBC: CPT

## 2020-12-24 PROCEDURE — 80048 BASIC METABOLIC PNL TOTAL CA: CPT

## 2020-12-24 PROCEDURE — 99285 EMERGENCY DEPT VISIT HI MDM: CPT

## 2020-12-24 PROCEDURE — 36415 COLL VENOUS BLD VENIPUNCTURE: CPT

## 2020-12-24 PROCEDURE — 2580000003 HC RX 258: Performed by: STUDENT IN AN ORGANIZED HEALTH CARE EDUCATION/TRAINING PROGRAM

## 2020-12-24 PROCEDURE — 93005 ELECTROCARDIOGRAM TRACING: CPT | Performed by: STUDENT IN AN ORGANIZED HEALTH CARE EDUCATION/TRAINING PROGRAM

## 2020-12-24 PROCEDURE — 83605 ASSAY OF LACTIC ACID: CPT

## 2020-12-24 PROCEDURE — 82803 BLOOD GASES ANY COMBINATION: CPT

## 2020-12-24 PROCEDURE — 96365 THER/PROPH/DIAG IV INF INIT: CPT

## 2020-12-24 PROCEDURE — 96375 TX/PRO/DX INJ NEW DRUG ADDON: CPT

## 2020-12-24 PROCEDURE — 83036 HEMOGLOBIN GLYCOSYLATED A1C: CPT

## 2020-12-24 PROCEDURE — 96366 THER/PROPH/DIAG IV INF ADDON: CPT

## 2020-12-24 PROCEDURE — 6360000002 HC RX W HCPCS: Performed by: STUDENT IN AN ORGANIZED HEALTH CARE EDUCATION/TRAINING PROGRAM

## 2020-12-24 RX ORDER — DEXTROSE MONOHYDRATE 50 MG/ML
100 INJECTION, SOLUTION INTRAVENOUS PRN
Status: DISCONTINUED | OUTPATIENT
Start: 2020-12-24 | End: 2020-12-25 | Stop reason: HOSPADM

## 2020-12-24 RX ORDER — DEXTROSE, SODIUM CHLORIDE, SODIUM LACTATE, POTASSIUM CHLORIDE, AND CALCIUM CHLORIDE 5; .6; .31; .03; .02 G/100ML; G/100ML; G/100ML; G/100ML; G/100ML
1000 INJECTION, SOLUTION INTRAVENOUS ONCE
Status: COMPLETED | OUTPATIENT
Start: 2020-12-24 | End: 2020-12-24

## 2020-12-24 RX ORDER — HYDRALAZINE HYDROCHLORIDE 20 MG/ML
5 INJECTION INTRAMUSCULAR; INTRAVENOUS EVERY 4 HOURS PRN
Status: DISCONTINUED | OUTPATIENT
Start: 2020-12-24 | End: 2020-12-25 | Stop reason: HOSPADM

## 2020-12-24 RX ORDER — LORAZEPAM 1 MG/1
2 TABLET ORAL
Status: DISCONTINUED | OUTPATIENT
Start: 2020-12-24 | End: 2020-12-25 | Stop reason: HOSPADM

## 2020-12-24 RX ORDER — LORAZEPAM 2 MG/ML
2 INJECTION INTRAMUSCULAR
Status: DISCONTINUED | OUTPATIENT
Start: 2020-12-24 | End: 2020-12-25 | Stop reason: HOSPADM

## 2020-12-24 RX ORDER — SODIUM CHLORIDE 0.9 % (FLUSH) 0.9 %
10 SYRINGE (ML) INJECTION EVERY 12 HOURS SCHEDULED
Status: DISCONTINUED | OUTPATIENT
Start: 2020-12-25 | End: 2020-12-25 | Stop reason: HOSPADM

## 2020-12-24 RX ORDER — DEXTROSE MONOHYDRATE 25 G/50ML
12.5 INJECTION, SOLUTION INTRAVENOUS PRN
Status: DISCONTINUED | OUTPATIENT
Start: 2020-12-24 | End: 2020-12-25 | Stop reason: HOSPADM

## 2020-12-24 RX ORDER — MULTIVITAMIN WITH IRON
1 TABLET ORAL DAILY
Status: DISCONTINUED | OUTPATIENT
Start: 2020-12-25 | End: 2020-12-25 | Stop reason: HOSPADM

## 2020-12-24 RX ORDER — POLYETHYLENE GLYCOL 3350 17 G/17G
17 POWDER, FOR SOLUTION ORAL DAILY PRN
Status: DISCONTINUED | OUTPATIENT
Start: 2020-12-24 | End: 2020-12-25 | Stop reason: HOSPADM

## 2020-12-24 RX ORDER — LORAZEPAM 1 MG/1
4 TABLET ORAL
Status: DISCONTINUED | OUTPATIENT
Start: 2020-12-24 | End: 2020-12-25 | Stop reason: HOSPADM

## 2020-12-24 RX ORDER — DEXTROSE MONOHYDRATE 25 G/50ML
25 INJECTION, SOLUTION INTRAVENOUS ONCE
Status: COMPLETED | OUTPATIENT
Start: 2020-12-24 | End: 2020-12-24

## 2020-12-24 RX ORDER — PROMETHAZINE HYDROCHLORIDE 25 MG/ML
12.5 INJECTION, SOLUTION INTRAMUSCULAR; INTRAVENOUS ONCE
Status: COMPLETED | OUTPATIENT
Start: 2020-12-24 | End: 2020-12-24

## 2020-12-24 RX ORDER — LORAZEPAM 2 MG/ML
3 INJECTION INTRAMUSCULAR
Status: DISCONTINUED | OUTPATIENT
Start: 2020-12-24 | End: 2020-12-25 | Stop reason: HOSPADM

## 2020-12-24 RX ORDER — ACETAMINOPHEN 650 MG/1
650 SUPPOSITORY RECTAL EVERY 6 HOURS PRN
Status: DISCONTINUED | OUTPATIENT
Start: 2020-12-24 | End: 2020-12-25 | Stop reason: HOSPADM

## 2020-12-24 RX ORDER — CALCIUM GLUCONATE 94 MG/ML
1 INJECTION, SOLUTION INTRAVENOUS ONCE
Status: COMPLETED | OUTPATIENT
Start: 2020-12-24 | End: 2020-12-24

## 2020-12-24 RX ORDER — SODIUM CHLORIDE 0.9 % (FLUSH) 0.9 %
10 SYRINGE (ML) INJECTION PRN
Status: DISCONTINUED | OUTPATIENT
Start: 2020-12-24 | End: 2020-12-25 | Stop reason: HOSPADM

## 2020-12-24 RX ORDER — LORAZEPAM 2 MG/ML
1 INJECTION INTRAMUSCULAR
Status: DISCONTINUED | OUTPATIENT
Start: 2020-12-24 | End: 2020-12-25 | Stop reason: HOSPADM

## 2020-12-24 RX ORDER — SODIUM CHLORIDE, SODIUM LACTATE, POTASSIUM CHLORIDE, CALCIUM CHLORIDE 600; 310; 30; 20 MG/100ML; MG/100ML; MG/100ML; MG/100ML
1000 INJECTION, SOLUTION INTRAVENOUS ONCE
Status: COMPLETED | OUTPATIENT
Start: 2020-12-24 | End: 2020-12-24

## 2020-12-24 RX ORDER — PROMETHAZINE HYDROCHLORIDE 12.5 MG/1
12.5 TABLET ORAL EVERY 6 HOURS PRN
Status: DISCONTINUED | OUTPATIENT
Start: 2020-12-24 | End: 2020-12-25 | Stop reason: HOSPADM

## 2020-12-24 RX ORDER — NICOTINE POLACRILEX 4 MG
15 LOZENGE BUCCAL PRN
Status: DISCONTINUED | OUTPATIENT
Start: 2020-12-24 | End: 2020-12-25 | Stop reason: HOSPADM

## 2020-12-24 RX ORDER — DIAZEPAM 10 MG/1
10 TABLET ORAL ONCE
Status: COMPLETED | OUTPATIENT
Start: 2020-12-24 | End: 2020-12-24

## 2020-12-24 RX ORDER — SODIUM CHLORIDE, SODIUM LACTATE, POTASSIUM CHLORIDE, CALCIUM CHLORIDE 600; 310; 30; 20 MG/100ML; MG/100ML; MG/100ML; MG/100ML
INJECTION, SOLUTION INTRAVENOUS CONTINUOUS
Status: DISCONTINUED | OUTPATIENT
Start: 2020-12-25 | End: 2020-12-25

## 2020-12-24 RX ORDER — CHLORDIAZEPOXIDE HYDROCHLORIDE 25 MG/1
25 CAPSULE, GELATIN COATED ORAL 4 TIMES DAILY
Status: DISCONTINUED | OUTPATIENT
Start: 2020-12-25 | End: 2020-12-25

## 2020-12-24 RX ORDER — DEXTROSE, SODIUM CHLORIDE, SODIUM LACTATE, POTASSIUM CHLORIDE, AND CALCIUM CHLORIDE 5; .6; .31; .03; .02 G/100ML; G/100ML; G/100ML; G/100ML; G/100ML
1000 INJECTION, SOLUTION INTRAVENOUS ONCE
Status: DISCONTINUED | OUTPATIENT
Start: 2020-12-24 | End: 2020-12-24

## 2020-12-24 RX ORDER — LORAZEPAM 2 MG/ML
4 INJECTION INTRAMUSCULAR
Status: DISCONTINUED | OUTPATIENT
Start: 2020-12-24 | End: 2020-12-25 | Stop reason: HOSPADM

## 2020-12-24 RX ORDER — FUROSEMIDE 10 MG/ML
40 INJECTION INTRAMUSCULAR; INTRAVENOUS ONCE
Status: COMPLETED | OUTPATIENT
Start: 2020-12-24 | End: 2020-12-24

## 2020-12-24 RX ORDER — LORAZEPAM 1 MG/1
1 TABLET ORAL
Status: DISCONTINUED | OUTPATIENT
Start: 2020-12-24 | End: 2020-12-25 | Stop reason: HOSPADM

## 2020-12-24 RX ORDER — ONDANSETRON 4 MG/1
8 TABLET, ORALLY DISINTEGRATING ORAL ONCE
Status: COMPLETED | OUTPATIENT
Start: 2020-12-24 | End: 2020-12-24

## 2020-12-24 RX ORDER — ACETAMINOPHEN 325 MG/1
650 TABLET ORAL EVERY 6 HOURS PRN
Status: DISCONTINUED | OUTPATIENT
Start: 2020-12-24 | End: 2020-12-25 | Stop reason: HOSPADM

## 2020-12-24 RX ORDER — PANTOPRAZOLE SODIUM 40 MG/10ML
40 INJECTION, POWDER, LYOPHILIZED, FOR SOLUTION INTRAVENOUS DAILY
Status: DISCONTINUED | OUTPATIENT
Start: 2020-12-25 | End: 2020-12-25 | Stop reason: HOSPADM

## 2020-12-24 RX ORDER — ONDANSETRON 2 MG/ML
4 INJECTION INTRAMUSCULAR; INTRAVENOUS EVERY 6 HOURS PRN
Status: DISCONTINUED | OUTPATIENT
Start: 2020-12-24 | End: 2020-12-25 | Stop reason: HOSPADM

## 2020-12-24 RX ORDER — LORAZEPAM 1 MG/1
3 TABLET ORAL
Status: DISCONTINUED | OUTPATIENT
Start: 2020-12-24 | End: 2020-12-25 | Stop reason: HOSPADM

## 2020-12-24 RX ORDER — LANOLIN ALCOHOL/MO/W.PET/CERES
100 CREAM (GRAM) TOPICAL DAILY
Status: DISCONTINUED | OUTPATIENT
Start: 2020-12-25 | End: 2020-12-25 | Stop reason: HOSPADM

## 2020-12-24 RX ADMIN — DEXTROSE MONOHYDRATE 25 G: 500 INJECTION PARENTERAL at 18:42

## 2020-12-24 RX ADMIN — SODIUM CHLORIDE, SODIUM LACTATE, POTASSIUM CHLORIDE, AND CALCIUM CHLORIDE 1000 ML: .6; .31; .03; .02 INJECTION, SOLUTION INTRAVENOUS at 17:35

## 2020-12-24 RX ADMIN — FUROSEMIDE 40 MG: 10 INJECTION, SOLUTION INTRAMUSCULAR; INTRAVENOUS at 18:43

## 2020-12-24 RX ADMIN — INSULIN HUMAN 10 UNITS: 100 INJECTION, SOLUTION PARENTERAL at 18:53

## 2020-12-24 RX ADMIN — DIAZEPAM 10 MG: 10 TABLET ORAL at 19:35

## 2020-12-24 RX ADMIN — SODIUM CHLORIDE, SODIUM LACTATE, POTASSIUM CHLORIDE, CALCIUM CHLORIDE, AND DEXTROSE MONOHYDRATE 1000 ML: 600; 310; 30; 20; 5 INJECTION, SOLUTION INTRAVENOUS at 17:00

## 2020-12-24 RX ADMIN — SODIUM BICARBONATE 50 MEQ: 84 INJECTION, SOLUTION INTRAVENOUS at 18:40

## 2020-12-24 RX ADMIN — FOLIC ACID: 5 INJECTION, SOLUTION INTRAMUSCULAR; INTRAVENOUS; SUBCUTANEOUS at 16:57

## 2020-12-24 RX ADMIN — CALCIUM GLUCONATE 1 G: 98 INJECTION, SOLUTION INTRAVENOUS at 18:37

## 2020-12-24 RX ADMIN — PROMETHAZINE HYDROCHLORIDE 12.5 MG: 25 INJECTION INTRAMUSCULAR; INTRAVENOUS at 19:11

## 2020-12-24 RX ADMIN — DIAZEPAM 10 MG: 10 TABLET ORAL at 16:57

## 2020-12-24 RX ADMIN — SODIUM CHLORIDE, POTASSIUM CHLORIDE, SODIUM LACTATE AND CALCIUM CHLORIDE 1000 ML: 600; 310; 30; 20 INJECTION, SOLUTION INTRAVENOUS at 19:05

## 2020-12-24 RX ADMIN — ONDANSETRON 8 MG: 4 TABLET, ORALLY DISINTEGRATING ORAL at 16:57

## 2020-12-24 ASSESSMENT — ENCOUNTER SYMPTOMS
COUGH: 0
RESPIRATORY NEGATIVE: 1
NAUSEA: 1
SORE THROAT: 0
SHORTNESS OF BREATH: 0
CONSTIPATION: 0
BACK PAIN: 0
PHOTOPHOBIA: 0
EYES NEGATIVE: 1
VOMITING: 1
RHINORRHEA: 0
TROUBLE SWALLOWING: 0
ABDOMINAL PAIN: 1

## 2020-12-24 ASSESSMENT — PAIN DESCRIPTION - PAIN TYPE: TYPE: ACUTE PAIN

## 2020-12-24 ASSESSMENT — PAIN SCALES - GENERAL: PAINLEVEL_OUTOF10: 0

## 2020-12-24 ASSESSMENT — PAIN DESCRIPTION - LOCATION: LOCATION: ABDOMEN

## 2020-12-24 ASSESSMENT — PAIN DESCRIPTION - DESCRIPTORS: DESCRIPTORS: CRAMPING

## 2020-12-24 NOTE — ED PROVIDER NOTES
[address] [facility]          EM RESIDENT NOTE       Date of evaluation: 12/24/2020    Chief Complaint     Emesis and Withdrawal    History of Present Illness     Yaneli Merrill is a 47 y.o. male with a past medical history of hypertension, alcohol use disorder, who presents with concerns for alcohol withdrawal.  Patient reports he was drinking approximately 1/5 of vodka per day with last drink 12/22 in the AM.  He has a history of previous alcohol abuse and has been through multiple detox programs in the past.  He denies previous hospitalization for alcohol withdrawal and additionally denies withdrawal seizures. Since his last drink, patient endorses symptoms consistent with alcohol withdrawal including diffuse cramping, nausea, vomiting, tremors, anxiety and restlessness. He has been unable to take his antihypertensive medication secondary to his vomiting and does feel like his blood pressure is elevated. He denies recent illness. Denies fever or chills. Denies cough, shortness of breath, chest pain, palpitations. Does endorse diffuse abdominal cramping but no focal pain and additionally denies diarrhea. His emesis is nonbloody and nonbilious. He does desire to get back into treatment for his alcohol use disorder; he was previously followed by Edgar Talbert. Review of Systems     Review of Systems   Constitutional: Positive for fatigue. Negative for activity change, appetite change, chills, diaphoresis and fever. HENT: Negative. Negative for congestion, rhinorrhea, sore throat and trouble swallowing. Eyes: Negative. Negative for photophobia and visual disturbance. Respiratory: Negative. Negative for cough and shortness of breath. Cardiovascular: Negative. Negative for chest pain and leg swelling. Gastrointestinal: Positive for abdominal pain, nausea and vomiting. Negative for constipation. Endocrine: Negative. Genitourinary: Negative.   Negative for dysuria, flank pain, hematuria and urgency. Musculoskeletal: Positive for myalgias. Negative for arthralgias, back pain and joint swelling. Skin: Negative. Negative for pallor, rash and wound. Neurological: Positive for headaches. Negative for dizziness, syncope, weakness and numbness. Hematological: Negative. Psychiatric/Behavioral: Positive for agitation. Negative for confusion and hallucinations. The patient is nervous/anxious. All other systems reviewed and are negative. Past Medical, Surgical, Family, and Social History     He has a past medical history of Alcohol abuse and Hypertension. He has no past surgical history on file. His family history includes Colon Cancer in his father; Glaucoma in his father. He reports that he has been smoking e-cigarettes. He has been smoking about 0.00 packs per day. He has never used smokeless tobacco. He reports current alcohol use. He reports that he does not use drugs. Medications     Previous Medications    METOPROLOL TARTRATE (LOPRESSOR) 25 MG TABLET    TAKE 1/2 TABLET BY MOUTH EVERY DAY       Allergies     He is allergic to amlodipine; banana; carvedilol; and tree nut  [macadamia nut oil]. Physical Exam     INITIAL VITALS: BP: (!) 171/104, Temp: 98.5 °F (36.9 °C), Pulse: 118, Resp: 18, SpO2: 97 %   Physical Exam  Vitals signs and nursing note reviewed. Constitutional:       General: He is not in acute distress. Appearance: Normal appearance. He is normal weight. He is ill-appearing. He is not diaphoretic. HENT:      Head: Normocephalic and atraumatic. Mouth/Throat:      Mouth: Mucous membranes are moist.      Pharynx: Oropharynx is clear. Eyes:      Extraocular Movements: Extraocular movements intact. Conjunctiva/sclera: Conjunctivae normal.      Pupils: Pupils are equal, round, and reactive to light. Neck:      Musculoskeletal: Normal range of motion and neck supple. No neck rigidity or muscular tenderness. Cardiovascular:      Rate and Rhythm: Regular rhythm. Tachycardia present. Pulses: Normal pulses. Heart sounds: Normal heart sounds. No murmur. Pulmonary:      Effort: Pulmonary effort is normal. No respiratory distress. Breath sounds: Normal breath sounds. No wheezing, rhonchi or rales. Chest:      Chest wall: No tenderness. Abdominal:      General: Abdomen is flat. There is no distension. Palpations: Abdomen is soft. Tenderness: There is abdominal tenderness. There is no guarding. Comments: Actively vomiting on my exam, NBNB emesis   Musculoskeletal:         General: No swelling, tenderness, deformity or signs of injury. Right lower leg: No edema. Left lower leg: No edema. Skin:     General: Skin is warm and dry. Capillary Refill: Capillary refill takes less than 2 seconds. Coloration: Skin is not jaundiced. Findings: No erythema or rash. Neurological:      General: No focal deficit present. Mental Status: He is alert and oriented to person, place, and time. Mental status is at baseline. Cranial Nerves: No cranial nerve deficit. Sensory: No sensory deficit. Motor: No weakness. Coordination: Coordination normal.      Gait: Gait normal.   Psychiatric:         Attention and Perception: He is attentive. He does not perceive auditory or visual hallucinations. Mood and Affect: Mood normal.         Speech: Speech normal.         Behavior: Behavior normal. Behavior is cooperative. Thought Content:  Thought content normal.         Judgment: Judgment normal.         DiagnosticResults     EKG   Interpreted in conjunction with emergencydepartment physician No att. providers found  Rhythm: sinus tachycardia  Rate: 100-110  Axis: normal  Ectopy: none  Conduction: normal  ST Segments: nonspecific changes in inferior leads  T Waves:peaked in  anterior leads  Q Waves: none  Clinical Impression: sinus tachycardia with slight Alkaline Phosphatase 100 40 - 129 U/L     (H) 10 - 40 U/L     (H) 15 - 37 U/L    Total Bilirubin 0.8 0.0 - 1.0 mg/dL    Bilirubin, Direct <0.2 0.0 - 0.3 mg/dL    Bilirubin, Indirect see below 0.0 - 1.0 mg/dL   Lipase   Result Value Ref Range    Lipase 32.0 13.0 - 60.0 U/L   Basic Metabolic Panel w/ Reflex to MG   Result Value Ref Range    Sodium 139 136 - 145 mmol/L    Potassium reflex Magnesium 5.6 (H) 3.5 - 5.1 mmol/L    Chloride 90 (L) 99 - 110 mmol/L    CO2 10 (LL) 21 - 32 mmol/L    Anion Gap 39 (H) 3 - 16    Glucose 96 70 - 99 mg/dL    BUN 18 7 - 20 mg/dL    CREATININE 1.5 (H) 0.9 - 1.3 mg/dL    GFR Non-African American 52 (A) >60    GFR African American >60 >60    Calcium 9.6 8.3 - 10.6 mg/dL   POC Glucose   Result Value Ref Range    Glucose 103 mg/dL    QC OK? yes    POCT Glucose   Result Value Ref Range    POC Glucose 103 (H) 70 - 99 mg/dl    Performed on ACCU-CHEK    POCT Glucose   Result Value Ref Range    Glucose 252 mg/dL    QC OK? yes    POCT Glucose   Result Value Ref Range    POC Glucose 252 (H) 70 - 99 mg/dl    Performed on ACCU-CHEK    EKG 12 Lead   Result Value Ref Range    Ventricular Rate 109 BPM    Atrial Rate 109 BPM    P-R Interval 146 ms    QRS Duration 80 ms    Q-T Interval 364 ms    QTc Calculation (Bazett) 490 ms    P Axis 58 degrees    R Axis 67 degrees    T Axis 52 degrees    Diagnosis       EKG performed in ER and to be interpreted by ER physician. Confirmed by MD, ER ([id number]),  Jordyn Pascal ([id number]) on 12/24/2020 5:45:07 PM       RECENT VITALS:  BP: (!) 150/101, Temp: 98.5 °F (36.9 °C), Pulse: 106,Resp: 18, SpO2: 99 %     Procedures     ED Course     Nursing Notes, Past Medical Hx, Past Surgical Hx, Social Hx, Allergies, and Family Hx were reviewed.     The patient was given the followingmedications:  Orders Placed This Encounter   Medications    dextrose 5 % in lactated ringers infusion    sodium chloride 0.9 % 50 mL with folic acid 1 mg, adult multi-vitamin with vitamin k 10 mL, thiamine 100 mg    diazePAM (VALIUM) tablet 10 mg    ondansetron (ZOFRAN-ODT) disintegrating tablet 8 mg    lactated ringers infusion 1,000 mL    calcium gluconate 10 % injection 1 g    sodium bicarbonate 8.4 % injection 50 mEq    AND Linked Order Group     insulin regular (HUMULIN R;NOVOLIN R) injection 10 Units     dextrose 50 % IV solution    glucose (GLUTOSE) 40 % oral gel 15 g    dextrose 50 % IV solution    glucagon (rDNA) injection 1 mg    dextrose 5 % solution    furosemide (LASIX) injection 40 mg    DISCONTD: dextrose 5 % in lactated ringers infusion    lactated ringers infusion 1,000 mL    promethazine (PHENERGAN) injection 12.5 mg       CONSULTS:  IP CONSULT TO [id number] [facility]her / DAVID / Lawanda Emily is a 47 y.o. male who appears now just the oneuncomfortable and is tachycardic and hypertensive consistent with acute alcohol withdrawal.  Given his reported history of significant nausea and vomiting, I will obtain BMP to evaluate for significant electrolyte derangements and acute kidney dysfunction. ED Course as of Dec 24 1921   Thu Dec 24, 2020   1701 BMP collected reveals a severe anion gap metabolic acidosis most likely secondary to starvation versus alcoholic ketoacidosis. He additionally has demonstration of an acute kidney injury with a creatinine of 1.4. I do feel this will require significant intervention to correct especially given my anticipation that his withdrawals will continue for approximately 24-36 hours, limiting his ability to take in adequate p.o. I spoke with the patient and recommended inpatient admission however, he reports that he is unable to stay the night, citing the [place] holiday. He is amenable to staying for several hours while his initial interventions are being completed and I will try to reassess labs at that time.     [OU]   1724 POC glucose reassuring.     [OU]   5902 VBG concerning for a incompletely compensated metabolic acidosis with a pH of 7.2 and PCO2 of 28.8. Patient's bicarb is 11 with a base deficit of 16. Again, this is most consistent with likely alcoholic ketoacidosis versus starvation ketosis. Additionally, CBC demonstrates no significant abnormalities apart from a macrocytic red blood cell morphology consistent with patient's known history of significant alcohol use. [OU]   1812 BMP repeated secondary to hemolysis present on original sample which confirms hyperkalemia to 5.6, his severe and now slightly worsened anion gap metabolic acidosis and acute kidney injury. Additionally, his lactate is elevated at 4.5 which is likely contributory to his metabolic acidosis. Lipase is unremarkable however patient does have evidence of mild transaminitis, most likely secondary to chronic alcohol use. [OU]   1995 Given concerning T wave morphology on EKG, I have elected to aggressively treat this patient's hyperkalemia with calcium gluconate, insulin, dextrose, Lasix and ongoing volume resuscitation. [OU]   1847 On reassessment, the patient overall appears more comfortable however continues to complain of nausea and is unable to tolerate oral hydration. He is currently receiving his treatment for his hyperkalemia and 1/3 L of IV fluids is running. I spoke with the patient regarding the severity of his metabolic acidosis and the significant danger of this worsening as well as the danger of his hyperkalemia if it is refractory to treatment. I again encouraged him to consider inpatient admission but the patient continues to be very hesitant given his [place] celebration plans with his family. At this time, will give him IV Phenergan and reassess VBG, lactate and BMP after his third liter of fluids is completed. [OU]   1919 I am turning over care of this patient to my colleague, Shanna Bullock, [address].   The responsibilities will include follow-up of VBG, lactate and BMP after third liter of IV fluids, clinical reassessment including reassessment for return of alcohol withdrawal symptoms and disposition. Again, I feel very strongly this patient should be admitted however on multiple occasions the patient reports he is unable to stay secondary to the [place] holiday. I have advised my colleague that if the patient is to leave, he should be signed out [address]    [OU]      ED Course User Index  [OU] Stella Santana MD     This patient was also evaluated by the attending physician. All care plans werediscussed and agreed upon. Clinical Impression     1. Alcohol withdrawal syndrome with complication ([facility] [address]    2. Metabolic acidosis        Disposition     PATIENT REFERRED TO:  No follow-up provider specified.     DISCHARGE MEDICATIONS:  New Prescriptions    No medications on file       DISPOSITION       Stella Santana MD  Resident  12/24/20 [id number]

## 2020-12-24 NOTE — ED TRIAGE NOTES
Pt here today states he stopped drinking 2 days ago. Pt states he can drink up to a fifth of liquor a day. Pt states he has not been able to keep anything down states that he is having body cramps.

## 2020-12-25 VITALS
RESPIRATION RATE: 18 BRPM | OXYGEN SATURATION: 96 % | HEART RATE: 91 BPM | WEIGHT: 207 LBS | DIASTOLIC BLOOD PRESSURE: 105 MMHG | HEIGHT: 69 IN | TEMPERATURE: 97.6 F | SYSTOLIC BLOOD PRESSURE: 148 MMHG | BODY MASS INDEX: 30.66 KG/M2

## 2020-12-25 LAB
A/G RATIO: 1.4 (ref 1.1–2.2)
ALBUMIN SERPL-MCNC: 4.5 G/DL (ref 3.4–5)
ALP BLD-CCNC: 86 U/L (ref 40–129)
ALT SERPL-CCNC: 79 U/L (ref 10–40)
ANION GAP SERPL CALCULATED.3IONS-SCNC: 15 MMOL/L (ref 3–16)
AST SERPL-CCNC: 90 U/L (ref 15–37)
BILIRUB SERPL-MCNC: 1.1 MG/DL (ref 0–1)
BUN BLDV-MCNC: 17 MG/DL (ref 7–20)
CALCIUM SERPL-MCNC: 9.3 MG/DL (ref 8.3–10.6)
CHLORIDE BLD-SCNC: 92 MMOL/L (ref 99–110)
CO2: 26 MMOL/L (ref 21–32)
CREAT SERPL-MCNC: 1.5 MG/DL (ref 0.9–1.3)
EKG ATRIAL RATE: 97 BPM
EKG DIAGNOSIS: NORMAL
EKG P AXIS: 44 DEGREES
EKG P-R INTERVAL: 162 MS
EKG Q-T INTERVAL: 344 MS
EKG QRS DURATION: 76 MS
EKG QTC CALCULATION (BAZETT): 436 MS
EKG R AXIS: 67 DEGREES
EKG T AXIS: 31 DEGREES
EKG VENTRICULAR RATE: 97 BPM
ESTIMATED AVERAGE GLUCOSE: 111.2 MG/DL
GFR AFRICAN AMERICAN: >60
GFR NON-AFRICAN AMERICAN: 52
GLOBULIN: 3.2 G/DL
GLUCOSE BLD-MCNC: 127 MG/DL (ref 70–99)
GLUCOSE BLD-MCNC: 140 MG/DL (ref 70–99)
GLUCOSE BLD-MCNC: 189 MG/DL (ref 70–99)
HBA1C MFR BLD: 5.5 %
LACTIC ACID: 2.1 MMOL/L (ref 0.4–2)
LACTIC ACID: 2.1 MMOL/L (ref 0.4–2)
MAGNESIUM: 1.6 MG/DL (ref 1.8–2.4)
PERFORMED ON: ABNORMAL
PERFORMED ON: ABNORMAL
POTASSIUM REFLEX MAGNESIUM: 3.5 MMOL/L (ref 3.5–5.1)
SODIUM BLD-SCNC: 133 MMOL/L (ref 136–145)
TOTAL PROTEIN: 7.7 G/DL (ref 6.4–8.2)

## 2020-12-25 PROCEDURE — 83735 ASSAY OF MAGNESIUM: CPT

## 2020-12-25 PROCEDURE — 2580000003 HC RX 258: Performed by: INTERNAL MEDICINE

## 2020-12-25 PROCEDURE — 80053 COMPREHEN METABOLIC PANEL: CPT

## 2020-12-25 PROCEDURE — 6370000000 HC RX 637 (ALT 250 FOR IP): Performed by: INTERNAL MEDICINE

## 2020-12-25 PROCEDURE — 6360000002 HC RX W HCPCS: Performed by: INTERNAL MEDICINE

## 2020-12-25 PROCEDURE — 83605 ASSAY OF LACTIC ACID: CPT

## 2020-12-25 PROCEDURE — 36415 COLL VENOUS BLD VENIPUNCTURE: CPT

## 2020-12-25 PROCEDURE — C9113 INJ PANTOPRAZOLE SODIUM, VIA: HCPCS | Performed by: INTERNAL MEDICINE

## 2020-12-25 RX ORDER — DIAZEPAM 5 MG/1
5 TABLET ORAL EVERY 6 HOURS
Status: DISCONTINUED | OUTPATIENT
Start: 2020-12-25 | End: 2020-12-25 | Stop reason: HOSPADM

## 2020-12-25 RX ORDER — LANOLIN ALCOHOL/MO/W.PET/CERES
100 CREAM (GRAM) TOPICAL DAILY
Qty: 30 TABLET | Refills: 3 | Status: SHIPPED | OUTPATIENT
Start: 2020-12-26 | End: 2021-02-05 | Stop reason: ALTCHOICE

## 2020-12-25 RX ORDER — MAGNESIUM SULFATE IN WATER 40 MG/ML
2 INJECTION, SOLUTION INTRAVENOUS ONCE
Status: COMPLETED | OUTPATIENT
Start: 2020-12-25 | End: 2020-12-25

## 2020-12-25 RX ORDER — PANTOPRAZOLE SODIUM 40 MG/1
40 TABLET, DELAYED RELEASE ORAL
Qty: 30 TABLET | Refills: 0 | Status: SHIPPED | OUTPATIENT
Start: 2020-12-25 | End: 2021-02-05 | Stop reason: ALTCHOICE

## 2020-12-25 RX ORDER — SODIUM CHLORIDE 9 MG/ML
INJECTION, SOLUTION INTRAVENOUS CONTINUOUS
Status: DISCONTINUED | OUTPATIENT
Start: 2020-12-25 | End: 2020-12-25 | Stop reason: HOSPADM

## 2020-12-25 RX ORDER — POTASSIUM CHLORIDE 7.45 MG/ML
10 INJECTION INTRAVENOUS
Status: COMPLETED | OUTPATIENT
Start: 2020-12-25 | End: 2020-12-25

## 2020-12-25 RX ORDER — DIAZEPAM 5 MG/1
TABLET ORAL
Qty: 14 TABLET | Refills: 0 | Status: SHIPPED | OUTPATIENT
Start: 2020-12-25 | End: 2020-12-30

## 2020-12-25 RX ADMIN — LORAZEPAM 2 MG: 2 INJECTION INTRAMUSCULAR; INTRAVENOUS at 02:31

## 2020-12-25 RX ADMIN — LORAZEPAM 2 MG: 2 INJECTION INTRAMUSCULAR; INTRAVENOUS at 00:43

## 2020-12-25 RX ADMIN — PANTOPRAZOLE SODIUM 40 MG: 40 INJECTION, POWDER, FOR SOLUTION INTRAVENOUS at 00:43

## 2020-12-25 RX ADMIN — POTASSIUM CHLORIDE 10 MEQ: 7.46 INJECTION, SOLUTION INTRAVENOUS at 10:25

## 2020-12-25 RX ADMIN — DIAZEPAM 5 MG: 5 TABLET ORAL at 12:20

## 2020-12-25 RX ADMIN — POTASSIUM CHLORIDE 10 MEQ: 7.46 INJECTION, SOLUTION INTRAVENOUS at 09:18

## 2020-12-25 RX ADMIN — MAGNESIUM SULFATE HEPTAHYDRATE 2 G: 40 INJECTION, SOLUTION INTRAVENOUS at 09:20

## 2020-12-25 RX ADMIN — METOPROLOL TARTRATE 12.5 MG: 25 TABLET, FILM COATED ORAL at 08:21

## 2020-12-25 RX ADMIN — LORAZEPAM 1 MG: 2 INJECTION INTRAMUSCULAR; INTRAVENOUS at 03:56

## 2020-12-25 RX ADMIN — SODIUM CHLORIDE, POTASSIUM CHLORIDE, SODIUM LACTATE AND CALCIUM CHLORIDE: 600; 310; 30; 20 INJECTION, SOLUTION INTRAVENOUS at 00:44

## 2020-12-25 RX ADMIN — THERA TABS 1 TABLET: TAB at 08:22

## 2020-12-25 RX ADMIN — HYDRALAZINE HYDROCHLORIDE 5 MG: 20 INJECTION, SOLUTION INTRAMUSCULAR; INTRAVENOUS at 03:56

## 2020-12-25 RX ADMIN — CHLORDIAZEPOXIDE HYDROCHLORIDE 25 MG: 25 CAPSULE ORAL at 08:22

## 2020-12-25 RX ADMIN — HYDRALAZINE HYDROCHLORIDE 5 MG: 20 INJECTION, SOLUTION INTRAMUSCULAR; INTRAVENOUS at 08:22

## 2020-12-25 RX ADMIN — Medication 100 MG: at 08:22

## 2020-12-25 RX ADMIN — CHLORDIAZEPOXIDE HYDROCHLORIDE 25 MG: 25 CAPSULE ORAL at 00:43

## 2020-12-25 RX ADMIN — SODIUM CHLORIDE: 9 INJECTION, SOLUTION INTRAVENOUS at 09:16

## 2020-12-25 NOTE — H&P
Hospital Medicine History & Physical      PCP: Tennille Langford MD    Date of Admission: 12/24/2020    Date of Service: Pt seen/examined on 12/24/2020 and Admitted to Inpatient with expected LOS greater than two midnights due to medical therapy. Chief Complaint: Intractable vomiting      History Of Present Illness:     47 y.o. male who presents with intractable vomiting, inability to tolerate p.o. including his blood pressure medications, generalized muscle cramping. Patient has long history of alcohol use disorder, has been to rehab many times and has been sober intermittently. Patient drinks 1/5 of vodka every day. Last drink was 2 days ago in the morning. Patient denies prior hospitalizations for alcohol withdrawal or alcohol withdrawal seizures. Currently patient complains of diffuse muscle cramping and tremors. Denies blood in vomitus or chest pain/shortness of breath. Past Medical History:          Diagnosis Date    Alcohol abuse     Hypertension        Past Surgical History:      History reviewed. No pertinent surgical history. Medications Prior to Admission:      Prior to Admission medications    Medication Sig Start Date End Date Taking? Authorizing Provider   metoprolol tartrate (LOPRESSOR) 25 MG tablet TAKE 1/2 TABLET BY MOUTH EVERY DAY 37/50/23   Tegan Jefferson MD       Allergies:  Amlodipine, Banana, Carvedilol, and Tree nut  [macadamia nut oil]    Social History:    TOBACCO:   reports that he has been smoking e-cigarettes. He has been smoking about 0.00 packs per day. He has never used smokeless tobacco.  ETOH:   reports current alcohol use. E-Cigarettes/Vaping Use     Questions Responses    E-Cigarette/Vaping Use Current Every Day User    Start Date     Passive Exposure     Quit Date     Counseling Given     Comments             Family History:    Reviewed in detail and negative for DM, CAD, Cancer, CVA.  Positive as follows:        Problem Relation Age of Onset    Glaucoma Father     Colon Cancer Father        REVIEW OF SYSTEMS:   Pertinent positives as noted in the HPI. All other systems reviewed and negative. PHYSICAL EXAM PERFORMED:    BP (!) 163/106   Pulse 101   Temp 98.5 °F (36.9 °C)   Resp 20   Ht 5' 9\" (1.753 m)   Wt 207 lb (93.9 kg)   SpO2 98%   BMI 30.57 kg/m²     General appearance:  No apparent distress, appears stated age and cooperative. Tremulous +, anxious appearing  HEENT:  Normal cephalic, atraumatic without obvious deformity. Pupils equal, round, and reactive to light. Extra ocular muscles intact. Conjunctivae/corneas clear. Neck: Supple, with full range of motion. No jugular venous distention. Trachea midline. Respiratory:  Normal respiratory effort. Clear to auscultation, bilaterally without Rales/Wheezes/Rhonchi. Cardiovascular: Tachycardic rate and rhythm with normal S1/S2 without murmurs, rubs or gallops. Abdomen: Soft, mild epigastric tenderness, non-distended with normal bowel sounds. Musculoskeletal:  No clubbing, cyanosis or edema bilaterally. Full range of motion without deformity. Skin: Skin color, texture, turgor normal.  No rashes or lesions. Neurologic:  Neurovascularly intact without any focal sensory/motor deficits. Cranial nerves: II-XII intact, grossly non-focal.  Psychiatric:  Alert and oriented, thought content appropriate, normal insight  Capillary Refill: Brisk,< 3 seconds   Peripheral Pulses: +2 palpable, equal bilaterally       Labs:     Recent Labs     12/24/20  1720   WBC 7.2   HGB 14.1   HCT 42.5        Recent Labs     12/24/20  1604 12/24/20  1720 12/24/20 2003    139 133*   K 5.5* 5.6* 4.6   CL 88* 90* 88*   CO2 11* 10* 20*   BUN 17 18 17   CREATININE 1.4* 1.5* 1.5*   CALCIUM 9.6 9.6 9.5     Recent Labs     12/24/20  1720   *   *   BILIDIR <0.2   BILITOT 0.8   ALKPHOS 100     No results for input(s): INR in the last 72 hours.   No results for input(s): Petr Peterson in the last 72 hours.    Urinalysis:      Lab Results   Component Value Date    NITRU Negative 12/24/2020    WBCUA 6-10 12/17/2019    BACTERIA Rare 12/17/2019    RBCUA 0-2 12/17/2019    BLOODU Negative 12/24/2020    SPECGRAV 1.020 12/24/2020    GLUCOSEU 100 12/24/2020       Radiology:     CXR: I have reviewed the CXR with the following interpretation: N/A  EKG:  I have reviewed the EKG with the following interpretation: Sinus tachycardia, VR = 110, normal QTC, peaked T waves in anterior leads      ASSESSMENT:    Active Hospital Problems    Diagnosis Date Noted    Alcohol abuse with withdrawal [F10.139] 12/24/2020   Current CIWA score = 12 and has received 2 doses of Valium in ED  # Sinus tachycardia-due to combination of alcohol withdrawal + dehydration, improved after patient received 3 L of IV fluids in ED  # Intractable vomiting, nonbilious nonbloody-likely secondary to alcoholic gastritis along with epigastric tenderness  # History of chronic alcohol abuse, drinks 1/5 of vodka daily, last drink 2 days ago  # LIZZETTE  # Initial hyperkalemia-5.6 with EKG changes (peaked T waves)-received calcium gluconate, insulin, dextrose, bicarb, Lasix and IV fluids. Repeat serum potassium = 4.6  # Lactic acidosis likely secondary to dehydration  # Hyperglycemia-no history of DM  # Essential hypertension with uncontrolled blood pressure due to inability to tolerate p.o. antihypertensives  # Obesity with BMI of 31    PLAN:  Continue CIWA with Ativan  Scheduled Librium 4 times daily  Fall and seizure precautions  Received banana bag in ED  Will continue thiamine and MVT daily  Patient already seen by social service in ED  Continue antiemetics as ordered  Continue IV fluids  Protonix IV until patient is able to tolerate p.o.   Monitor electrolytes and renal function  Repeat EKG to follow-up on peaked T waves pending  Trend lactic acid levels  Check A1c and serum magnesium  Continue his home doses of beta-blocker as tolerated  IV antihypertensives

## 2020-12-25 NOTE — PROGRESS NOTES
Patient admitted to room 6309 from the ED. Patient arrived via wheelchair with nursing staff. All personal belongings with patient at this time. Patient A&O x4, VSS. Admission assessment complete. Telemetry box reapplied. Fall precautions in place. Bed is locked at the lowest position will bed alarm on. Patient is instructed to call nurse when needing assistance. All questions asked and answered. Patient has no further questions at this time. Will continue to monitor.

## 2020-12-25 NOTE — PROGRESS NOTES
No acute events occurred during this shift. Patient is A&O x4. BP increased to 157/101 at 0339, PRN hydralazine given. Patient sinus tach on monitor. Patient is currently resting comfortably in bed. Will continue to monitor.

## 2020-12-25 NOTE — DISCHARGE SUMMARY
Hospital Medicine Discharge Summary      Patient ID: Riley Limon      Patient's PCP: Israel Sauer MD    Admit Date: 12/24/2020     Discharge Date: 12/25/2020      Admitting Physician: Ken Davis MD    Discharge Physician: Stephen Astorga MD     Discharge Diagnoses: Active Hospital Problems    Diagnosis Date Noted    Alcohol abuse with withdrawal [F10.139] 12/24/2020         The patient was seen and examined on day of discharge and this discharge summary is in conjunction with any daily progress note from day of discharge. Hospital Course:     Patient is a 47 y.o. male who presented with intractable vomiting, inability to tolerate p.o. including his blood pressure medications, generalized muscle cramping. Patient has long history of alcohol use disorder, has been to rehab many times and has been sober intermittently. Patient drinks 1/5 of vodka every day. Last drink was 2 days ago in the morning. In ER patient was found to have lactic acidosis, dehydration, elevated liver enzymes. He also was undergoing active withdrawals. Patient was admitted and started on IVF, CIWA with ativan, electrolytes were replaced, PPI. He improved significantly overnight with resolution of abdominal symptoms. He was able to tlerate general diet well and withdrawal symptoms were well controlled. He reported good response to valium in the past and desire to quit alcohol at this time. Seen by social work and provided list of alcohol rehab programs. Discharged with valium taper, course of PPI and thiamine with outpatient follow up with PCP.          Consults:     IP CONSULT TO SOCIAL WORK  IP CONSULT TO HOSPITALIST  IP CONSULT TO SOCIAL WORK    Disposition: Home     Discharged Condition: Stable    Code Status: Full Code    Activity: activity as tolerated    Diet: regular diet    Follow Up: Primary Care Physician in two weeks    Exam:     General appearance: No apparent distress, appears stated age and cooperative. Lungs: Clear to ascultation, bilaterally without Rales/Wheezes/Rhonchi with good respiratory effort. Heart: Regular rate and rhythm with Normal S1/S2 without  murmurs, rubs or gallops, point of maximum impulse non-displaced  Abdomen: Soft, non-tender or non-distended without rigidity or guarding and positive bowel sounds all four quadrants. Extremities: No clubbing, cyanosis, or edema bilaterally. Skin: Skin color, texture, turgor normal.    Neurologic: Alert and oriented X 3,   grossly non-focal.  Mental status: Alert, oriented, thought content appropriate      Labs: For convenience and continuity at follow-up the following most recent labs are provided:    CBC:   Lab Results   Component Value Date    WBC 7.2 12/24/2020    HGB 14.1 12/24/2020    HCT 42.5 12/24/2020     12/24/2020       RENAL:   Lab Results   Component Value Date     12/25/2020    K 3.5 12/25/2020    CL 92 12/25/2020    CO2 26 12/25/2020    BUN 17 12/25/2020    CREATININE 1.5 12/25/2020           Discharge Medications:   Discharge Medication List as of 12/25/2020  1:56 PM           Details   diazePAM (VALIUM) 5 MG tablet Take 1 tablet by mouth 4 times daily for 2 days, THEN 1 tablet 3 times daily for 1 day, THEN 1 tablet 2 times daily for 1 day, THEN 1 tablet daily for 1 day., Disp-14 tablet, R-0Print      thiamine 100 MG tablet Take 1 tablet by mouth daily, Disp-30 tablet, R-3Print      pantoprazole (PROTONIX) 40 MG tablet Take 1 tablet by mouth every morning (before breakfast), Disp-30 tablet, R-0Print              Details   metoprolol tartrate (LOPRESSOR) 25 MG tablet TAKE 1/2 TABLET BY MOUTH EVERY DAY, Disp-30 tablet,R-0Normal                Time Spent on discharge is more than 30 minutes in the examination, evaluation, counseling and review of medications and discharge plan. Signed:  Heidi Thayer MD   12/25/2020      Thank you Patience Johnson MD for the opportunity to be involved in this patient's care.

## 2020-12-25 NOTE — PROGRESS NOTES
Patient was able to consume a turkey sandwich and orange. Patient states that he does not feel nauseous at this time. Will continue to monitor.

## 2020-12-25 NOTE — PLAN OF CARE
Problem: Nutrition Deficit:  Goal: Ability to achieve adequate nutritional intake will improve  Description: Ability to achieve adequate nutritional intake will improve  Outcome: Ongoing  Note: Patient is receiving LR at 100 mL/hr. Patient is able to drink water PO. Patient is tolerating PO food at this time. Patient's N/V is improving. Will continue to monitor.       Problem: Fluid Volume - Deficit:  Goal: Absence of fluid volume deficit signs and symptoms  Description: Absence of fluid volume deficit signs and symptoms  Outcome: Ongoing

## 2020-12-25 NOTE — ED PROVIDER NOTES
[address] [facility] ENCOUNTER          PHYSICIAN ASSISTANT NOTE     Date of evaluation: 12/24/2020    ADDENDUM:      Care of this patient was assumed from Dr. Ernestine Perry. The patient was seen for Emesis and Withdrawal  .  The patient's initial evaluation and plan have been discussed with the prior provider who initially evaluated the patient. Nursing Notes, Past Medical Hx, Past Surgical Hx, Social Hx, Allergies, and Family Hx were all reviewed.     Diagnostic Results     RADIOLOGY:  No orders to display       LABS:   Results for orders placed or performed during the hospital encounter of 82/02/10   Basic Metabolic Panel w/ Reflex to MG   Result Value Ref Range    Sodium 136 136 - 145 mmol/L    Potassium reflex Magnesium 5.5 (H) 3.5 - 5.1 mmol/L    Chloride 88 (L) 99 - 110 mmol/L    CO2 11 (LL) 21 - 32 mmol/L    Anion Gap 37 (H) 3 - 16    Glucose 92 70 - 99 mg/dL    BUN 17 7 - 20 mg/dL    CREATININE 1.4 (H) 0.9 - 1.3 mg/dL    GFR Non- 56 (A) >60    GFR African American >60 >60    Calcium 9.6 8.3 - 10.6 mg/dL   CBC Auto Differential   Result Value Ref Range    WBC 7.2 4.0 - 11.0 K/uL    RBC 4.17 (L) 4.20 - 5.90 M/uL    Hemoglobin 14.1 13.5 - 17.5 g/dL    Hematocrit 42.5 40.5 - 52.5 %    .0 (H) 80.0 - 100.0 fL    MCH 33.9 26.0 - 34.0 pg    MCHC 33.3 31.0 - 36.0 g/dL    RDW 14.7 12.4 - 15.4 %    Platelets 832 621 - 775 K/uL    MPV 6.9 5.0 - 10.5 fL    Neutrophils % 86.1 %    Lymphocytes % 7.4 %    Monocytes % 6.1 %    Eosinophils % 0.0 %    Basophils % 0.4 %    Neutrophils Absolute 6.2 1.7 - 7.7 K/uL    Lymphocytes Absolute 0.5 (L) 1.0 - 5.1 K/uL    Monocytes Absolute 0.4 0.0 - 1.3 K/uL    Eosinophils Absolute 0.0 0.0 - 0.6 K/uL    Basophils Absolute 0.0 0.0 - 0.2 K/uL   Blood Gas, Venous   Result Value Ref Range    pH, Abundio 7.205 (L) 7.350 - 7.450    pCO2, Abundio 28.8 (L) 41.0 - 51.0 mmHg    pO2, Abundio 75.6 (H) 25.0 - 40.0 mmHg    HCO3, Venous 11.0 (L) 24.0 - 28.0 mmol/L    Base Excess, Abundio -15.8 (L) -2.0 - 3.0 mmol/L    O2 Sat, Abundio 91 Not established %    Carboxyhemoglobin 1.9 (H) 0.0 - 1.5 %    MetHgb, Abundio 0.7 0.0 - 1.5 %    TC02 (Calc), Abundio 12 mmol/L    Hemoglobin, Abundio, Reduced 8.50 %   Lactic Acid, Plasma   Result Value Ref Range    Lactic Acid 4.5 (HH) 0.4 - 2.0 mmol/L   Urinalysis Reflex to Culture    Specimen: Urine, clean catch   Result Value Ref Range    Color, UA Yellow Straw/Yellow    Clarity, UA Clear Clear    Glucose, Ur 100 (A) Negative mg/dL    Bilirubin Urine Negative Negative    Ketones, Urine 40 (A) Negative mg/dL    Specific Gravity, UA 1.020 1.005 - 1.030    Blood, Urine Negative Negative    pH, UA 6.0 5.0 - 8.0    Protein, UA Negative Negative mg/dL    Urobilinogen, Urine 0.2 <2.0 E.U./dL    Nitrite, Urine Negative Negative    Leukocyte Esterase, Urine Negative Negative    Microscopic Examination Not Indicated     Urine Type NotGiven     Urine Reflex to Culture Not Indicated    Hepatic Function Panel   Result Value Ref Range    Total Protein 9.1 (H) 6.4 - 8.2 g/dL    Alb 5.4 (H) 3.4 - 5.0 g/dL    Alkaline Phosphatase 100 40 - 129 U/L     (H) 10 - 40 U/L     (H) 15 - 37 U/L    Total Bilirubin 0.8 0.0 - 1.0 mg/dL    Bilirubin, Direct <0.2 0.0 - 0.3 mg/dL    Bilirubin, Indirect see below 0.0 - 1.0 mg/dL   Lipase   Result Value Ref Range    Lipase 32.0 13.0 - 60.0 U/L   Basic Metabolic Panel w/ Reflex to MG   Result Value Ref Range    Sodium 139 136 - 145 mmol/L    Potassium reflex Magnesium 5.6 (H) 3.5 - 5.1 mmol/L    Chloride 90 (L) 99 - 110 mmol/L    CO2 10 (LL) 21 - 32 mmol/L    Anion Gap 39 (H) 3 - 16    Glucose 96 70 - 99 mg/dL    BUN 18 7 - 20 mg/dL    CREATININE 1.5 (H) 0.9 - 1.3 mg/dL    GFR Non-African American 52 (A) >60    GFR African American >60 >60    Calcium 9.6 8.3 - 10.6 mg/dL   Basic Metabolic Panel w/ Reflex to MG   Result Value Ref Range    Sodium 133 (L) 136 - 145 mmol/L    Potassium reflex Magnesium 4.6 3.5 - 5.1 mmol/L    Chloride 88 (L) 99 - 110 mmol/L    CO2 20 (L) 21 - 32 mmol/L    Anion Gap 25 (H) 3 - 16    Glucose 172 (H) 70 - 99 mg/dL    BUN 17 7 - 20 mg/dL    CREATININE 1.5 (H) 0.9 - 1.3 mg/dL    GFR Non-African American 52 (A) >60    GFR African American >60 >60    Calcium 9.5 8.3 - 10.6 mg/dL   Lactate, plasma   Result Value Ref Range    Lactic Acid 3.9 (H) 0.4 - 2.0 mmol/L   Blood gas, venous (Lab)   Result Value Ref Range    pH, Abundio 7.343 (L) 7.350 - 7.450    pCO2, Abundio 39.0 (L) 41.0 - 51.0 mmHg    pO2, Abundio 44.1 (H) 25.0 - 40.0 mmHg    HCO3, Venous 20.6 (L) 24.0 - 28.0 mmol/L    Base Excess, Abundio -4.2 (L) -2.0 - 3.0 mmol/L    O2 Sat, Abundio 73 Not established %    Carboxyhemoglobin 2.0 (H) 0.0 - 1.5 %    MetHgb, Abundio 0.8 0.0 - 1.5 %    TC02 (Calc), Abundio 22 mmol/L    Hemoglobin, Abundio, Reduced 26.40 %   POC Glucose   Result Value Ref Range    Glucose 103 mg/dL    QC OK? yes    POCT Glucose   Result Value Ref Range    POC Glucose 103 (H) 70 - 99 mg/dl    Performed on ACCU-CHEK    POCT Glucose   Result Value Ref Range    Glucose 252 mg/dL    QC OK? yes    POCT Glucose   Result Value Ref Range    POC Glucose 252 (H) 70 - 99 mg/dl    Performed on ACCU-CHEK    EKG 12 Lead   Result Value Ref Range    Ventricular Rate 109 BPM    Atrial Rate 109 BPM    P-R Interval 146 ms    QRS Duration 80 ms    Q-T Interval 364 ms    QTc Calculation (Bazett) 490 ms    P Axis 58 degrees    R Axis 67 degrees    T Axis 52 degrees    Diagnosis       EKG performed in ER and to be interpreted by ER physician. Confirmed by MD, ER ([id number]),  Canary Felty ([id number]) on 12/24/2020 5:45:07 PM       RECENT VITALS:  BP: (!) 144/91, Temp: 98.9 °F (37.2 °C), Pulse: 100, Resp: 20, SpO2: 95 %     ED Course     The patient was given the following medications:  Orders Placed This Encounter   Medications    dextrose 5 % in lactated ringers infusion    sodium chloride 0.9 % 50 mL with folic acid 1 mg, adult multi-vitamin with vitamin k 10 mL, thiamine 100 mg    diazePAM (VALIUM) Metabolic acidosis        Disposition     PATIENT REFERRED TO:  No follow-up provider specified.     DISCHARGE MEDICATIONS:  Current Discharge Medication List          DISPOSITION  12/24/2020 09:37:30 PM          Fe Mitchell PA-C  12/24/20 0182

## 2020-12-25 NOTE — ED NOTES
Report given to Duke Johnson RN. All questions asked, answered. Patient given box lunch prior to taking to the floor.      Allanesha Smith-Narcisse, RN  12/24/20 4603

## 2020-12-25 NOTE — PROGRESS NOTES
Discharge order received. Patient informed of discharge order. Discharge instructions reviewed with patient. Copy of discharge instructions given to patient. Signed prescriptions x 3 given to patient. Patient verbalized understanding, denies needs or questions at this time. IV and telemetry removed. All patient belongings packed and sent with patient upon discharge. Patient refused wheelchair and is steady on feet.

## 2020-12-25 NOTE — PROGRESS NOTES
Lactic acid 2.1. Spoke with Dr. Jayjay Zepeda, she is ok with discharging pt and encourage him to stay hydrated.

## 2021-02-05 ENCOUNTER — HOSPITAL ENCOUNTER (EMERGENCY)
Age: 41
Discharge: HOME | End: 2021-02-05
Attending: EMERGENCY MEDICINE
Payer: MEDICAID

## 2021-02-05 VITALS
TEMPERATURE: 98.7 F | HEART RATE: 87 BPM | SYSTOLIC BLOOD PRESSURE: 173 MMHG | RESPIRATION RATE: 16 BRPM | OXYGEN SATURATION: 98 % | WEIGHT: 205.1 LBS | DIASTOLIC BLOOD PRESSURE: 109 MMHG | BODY MASS INDEX: 30.29 KG/M2

## 2021-02-05 DIAGNOSIS — R11.2 NON-INTRACTABLE VOMITING WITH NAUSEA, UNSPECIFIED VOMITING TYPE: Primary | ICD-10-CM

## 2021-02-05 DIAGNOSIS — I10 HYPERTENSION, UNSPECIFIED TYPE: ICD-10-CM

## 2021-02-05 DIAGNOSIS — F10.930 ALCOHOL WITHDRAWAL SYNDROME WITHOUT COMPLICATION (HCC): ICD-10-CM

## 2021-02-05 LAB
A/G RATIO: 1.4 (ref 1.1–2.2)
ALBUMIN SERPL-MCNC: 4.9 G/DL (ref 3.4–5)
ALP BLD-CCNC: 89 U/L (ref 40–129)
ALT SERPL-CCNC: 115 U/L (ref 10–40)
ANION GAP SERPL CALCULATED.3IONS-SCNC: 29 MMOL/L (ref 3–16)
APTT: 34 SEC (ref 24.2–36.2)
AST SERPL-CCNC: 192 U/L (ref 15–37)
BACTERIA: ABNORMAL /HPF
BASOPHILS ABSOLUTE: 0.1 K/UL (ref 0–0.2)
BASOPHILS RELATIVE PERCENT: 1.3 %
BILIRUB SERPL-MCNC: 0.7 MG/DL (ref 0–1)
BILIRUBIN URINE: ABNORMAL
BLOOD, URINE: ABNORMAL
BUN BLDV-MCNC: 14 MG/DL (ref 7–20)
CALCIUM SERPL-MCNC: 9.6 MG/DL (ref 8.3–10.6)
CHLORIDE BLD-SCNC: 93 MMOL/L (ref 99–110)
CLARITY: CLEAR
CO2: 18 MMOL/L (ref 21–32)
COLOR: YELLOW
CREAT SERPL-MCNC: 1.1 MG/DL (ref 0.9–1.3)
EOSINOPHILS ABSOLUTE: 0 K/UL (ref 0–0.6)
EOSINOPHILS RELATIVE PERCENT: 0.8 %
EPITHELIAL CELLS, UA: ABNORMAL /HPF (ref 0–5)
ETHANOL: 38 MG/DL (ref 0–0.08)
GFR AFRICAN AMERICAN: >60
GFR NON-AFRICAN AMERICAN: >60
GLOBULIN: 3.6 G/DL
GLUCOSE BLD-MCNC: 83 MG/DL (ref 70–99)
GLUCOSE URINE: NEGATIVE MG/DL
HCT VFR BLD CALC: 40.2 % (ref 40.5–52.5)
HEMOGLOBIN: 13.2 G/DL (ref 13.5–17.5)
HYALINE CASTS: ABNORMAL /LPF (ref 0–2)
INR BLD: 1.04 (ref 0.86–1.14)
KETONES, URINE: >=80 MG/DL
LACTIC ACID: 2.2 MMOL/L (ref 0.4–2)
LACTIC ACID: 3.5 MMOL/L (ref 0.4–2)
LEUKOCYTE ESTERASE, URINE: NEGATIVE
LIPASE: 37 U/L (ref 13–60)
LYMPHOCYTES ABSOLUTE: 0.7 K/UL (ref 1–5.1)
LYMPHOCYTES RELATIVE PERCENT: 16.4 %
MCH RBC QN AUTO: 32.9 PG (ref 26–34)
MCHC RBC AUTO-ENTMCNC: 32.9 G/DL (ref 31–36)
MCV RBC AUTO: 100.1 FL (ref 80–100)
MICROSCOPIC EXAMINATION: YES
MONOCYTES ABSOLUTE: 0.4 K/UL (ref 0–1.3)
MONOCYTES RELATIVE PERCENT: 10 %
MUCUS: ABNORMAL /LPF
NEUTROPHILS ABSOLUTE: 3.2 K/UL (ref 1.7–7.7)
NEUTROPHILS RELATIVE PERCENT: 71.5 %
NITRITE, URINE: NEGATIVE
PDW BLD-RTO: 15.1 % (ref 12.4–15.4)
PH UA: 6 (ref 5–8)
PLATELET # BLD: 269 K/UL (ref 135–450)
PMV BLD AUTO: 6.7 FL (ref 5–10.5)
POTASSIUM SERPL-SCNC: 4.1 MMOL/L (ref 3.5–5.1)
PROTEIN UA: 100 MG/DL
PROTHROMBIN TIME: 12.1 SEC (ref 10–13.2)
RBC # BLD: 4.01 M/UL (ref 4.2–5.9)
RBC UA: ABNORMAL /HPF (ref 0–4)
SODIUM BLD-SCNC: 140 MMOL/L (ref 136–145)
SPECIFIC GRAVITY UA: >=1.03 (ref 1–1.03)
TOTAL PROTEIN: 8.5 G/DL (ref 6.4–8.2)
URINE TYPE: ABNORMAL
UROBILINOGEN, URINE: 0.2 E.U./DL
WBC # BLD: 4.5 K/UL (ref 4–11)
WBC UA: ABNORMAL /HPF (ref 0–5)

## 2021-02-05 PROCEDURE — 81001 URINALYSIS AUTO W/SCOPE: CPT

## 2021-02-05 PROCEDURE — 96375 TX/PRO/DX INJ NEW DRUG ADDON: CPT

## 2021-02-05 PROCEDURE — 80053 COMPREHEN METABOLIC PANEL: CPT

## 2021-02-05 PROCEDURE — 96374 THER/PROPH/DIAG INJ IV PUSH: CPT

## 2021-02-05 PROCEDURE — 85730 THROMBOPLASTIN TIME PARTIAL: CPT

## 2021-02-05 PROCEDURE — 6360000002 HC RX W HCPCS: Performed by: EMERGENCY MEDICINE

## 2021-02-05 PROCEDURE — 2580000003 HC RX 258: Performed by: STUDENT IN AN ORGANIZED HEALTH CARE EDUCATION/TRAINING PROGRAM

## 2021-02-05 PROCEDURE — 87086 URINE CULTURE/COLONY COUNT: CPT

## 2021-02-05 PROCEDURE — 83605 ASSAY OF LACTIC ACID: CPT

## 2021-02-05 PROCEDURE — 6370000000 HC RX 637 (ALT 250 FOR IP): Performed by: EMERGENCY MEDICINE

## 2021-02-05 PROCEDURE — C9113 INJ PANTOPRAZOLE SODIUM, VIA: HCPCS | Performed by: STUDENT IN AN ORGANIZED HEALTH CARE EDUCATION/TRAINING PROGRAM

## 2021-02-05 PROCEDURE — 85025 COMPLETE CBC W/AUTO DIFF WBC: CPT

## 2021-02-05 PROCEDURE — 82077 ASSAY SPEC XCP UR&BREATH IA: CPT

## 2021-02-05 PROCEDURE — 6360000002 HC RX W HCPCS: Performed by: STUDENT IN AN ORGANIZED HEALTH CARE EDUCATION/TRAINING PROGRAM

## 2021-02-05 PROCEDURE — 99284 EMERGENCY DEPT VISIT MOD MDM: CPT

## 2021-02-05 PROCEDURE — 83690 ASSAY OF LIPASE: CPT

## 2021-02-05 PROCEDURE — 85610 PROTHROMBIN TIME: CPT

## 2021-02-05 RX ORDER — OMEPRAZOLE 20 MG/1
20 CAPSULE, DELAYED RELEASE ORAL
Qty: 30 CAPSULE | Refills: 0 | Status: SHIPPED | OUTPATIENT
Start: 2021-02-05 | End: 2021-04-24

## 2021-02-05 RX ORDER — ONDANSETRON 2 MG/ML
4 INJECTION INTRAMUSCULAR; INTRAVENOUS ONCE
Status: COMPLETED | OUTPATIENT
Start: 2021-02-05 | End: 2021-02-05

## 2021-02-05 RX ORDER — LORAZEPAM 2 MG/ML
1 INJECTION INTRAMUSCULAR
Status: COMPLETED | OUTPATIENT
Start: 2021-02-05 | End: 2021-02-05

## 2021-02-05 RX ORDER — THIAMINE HYDROCHLORIDE 100 MG/ML
100 INJECTION, SOLUTION INTRAMUSCULAR; INTRAVENOUS ONCE
Status: COMPLETED | OUTPATIENT
Start: 2021-02-05 | End: 2021-02-05

## 2021-02-05 RX ORDER — 0.9 % SODIUM CHLORIDE 0.9 %
1000 INTRAVENOUS SOLUTION INTRAVENOUS ONCE
Status: COMPLETED | OUTPATIENT
Start: 2021-02-05 | End: 2021-02-05

## 2021-02-05 RX ORDER — PANTOPRAZOLE SODIUM 40 MG/1
40 TABLET, DELAYED RELEASE ORAL
Status: DISCONTINUED | OUTPATIENT
Start: 2021-02-05 | End: 2021-02-05

## 2021-02-05 RX ORDER — ONDANSETRON 4 MG/1
4 TABLET, FILM COATED ORAL 3 TIMES DAILY PRN
Qty: 30 TABLET | Refills: 0 | Status: SHIPPED | OUTPATIENT
Start: 2021-02-05 | End: 2021-05-14 | Stop reason: SDUPTHER

## 2021-02-05 RX ORDER — PANTOPRAZOLE SODIUM 40 MG/10ML
40 INJECTION, POWDER, LYOPHILIZED, FOR SOLUTION INTRAVENOUS ONCE
Status: COMPLETED | OUTPATIENT
Start: 2021-02-05 | End: 2021-02-05

## 2021-02-05 RX ADMIN — METOPROLOL TARTRATE 25 MG: 25 TABLET, FILM COATED ORAL at 16:02

## 2021-02-05 RX ADMIN — LORAZEPAM 1 MG: 2 INJECTION INTRAMUSCULAR; INTRAVENOUS at 14:20

## 2021-02-05 RX ADMIN — SODIUM CHLORIDE 1000 ML: 9 INJECTION, SOLUTION INTRAVENOUS at 14:19

## 2021-02-05 RX ADMIN — THIAMINE HYDROCHLORIDE 100 MG: 100 INJECTION, SOLUTION INTRAMUSCULAR; INTRAVENOUS at 14:20

## 2021-02-05 RX ADMIN — PANTOPRAZOLE SODIUM 40 MG: 40 INJECTION, POWDER, FOR SOLUTION INTRAVENOUS at 14:20

## 2021-02-05 RX ADMIN — ONDANSETRON 4 MG: 2 INJECTION INTRAMUSCULAR; INTRAVENOUS at 14:20

## 2021-02-05 NOTE — ED PROVIDER NOTES
Pt seen and evaluated under supervision from Dr. Teodoro Park  Emesis (pt states that he is in alcohol withdraw and last drink was a day and a half ago and vomiting since)      Simba Sanchez is a 47 y.o. male with a history of alcohol abuse, hypertension who presents to the ED complaining of nausea and vomiting. She states that he stopped drinking alcohol Wednesday night due to starting a new job. Patient reports that he usually drinks 1 standard bottle of vodka on a daily basis. He states that for the last day he started experiencing nausea, vomiting and tremors. He states yesterday the nausea and vomiting increased. He reports that he has not been able to eat any food, but has been trying to drink plenty of fluids. He states he has Valium at home, but was unable to take it due to the nausea and vomiting. He denies any auditory, visual, or tactile hallucinations. Is not been experiencing any seizures. He is currently in outpatient rehab at [address] [place] [address]. No other complaints, modifying factors or associated symptoms. I have reviewed the following from the nursing documentation. Past Medical History:   Diagnosis Date    Alcohol abuse     Hypertension      History reviewed. No pertinent surgical history.   Family History   Problem Relation Age of Onset    Glaucoma Father     Colon Cancer Father      Social History     Socioeconomic History    Marital status: Single     Spouse name: Not on file    Number of children: Not on file    Years of education: Not on file    Highest education level: Not on file   Occupational History    Not on file   Social Needs    Financial resource strain: Not on file    Food insecurity     Worry: Not on file     Inability: Not on file    Transportation needs     Medical: Not on file     Non-medical: Not on file   Tobacco Use    Smoking status: Current Every Day Smoker     Packs/day: 0.00     Types: E-Cigarettes    Smokeless tobacco: Never Used   Substance and Sexual Activity    Alcohol use: Yes     Comment: 1 fith /day    Drug use: Never    Sexual activity: Not on file   Lifestyle    Physical activity     Days per week: Not on file     Minutes per session: Not on file    Stress: Not on file   Relationships    Social connections     Talks on phone: Not on file     Gets together: Not on file     Attends [employer] service: Not on file     Active member of club or organization: Not on file     Attends meetings of clubs or organizations: Not on file     Relationship status: Not on file    Intimate partner violence     Fear of current or ex partner: Not on file     Emotionally abused: Not on file     Physically abused: Not on file     Forced sexual activity: Not on file   Other Topics Concern    Not on file   Social History Narrative    Not on file     No current facility-administered medications for this encounter. Current Outpatient Medications   Medication Sig Dispense Refill    omeprazole (PRILOSEC) 20 MG delayed release capsule Take 1 capsule by mouth every morning (before breakfast) 30 capsule 0    ondansetron (ZOFRAN) 4 MG tablet Take 1 tablet by mouth 3 times daily as needed for Nausea or Vomiting 30 tablet 0    metoprolol tartrate (LOPRESSOR) 25 MG tablet Take 1 tablet by mouth 2 times daily 30 tablet 0     Allergies   Allergen Reactions    Amlodipine Swelling     angioedema    Banana Other (See Comments)     Scratch throat    Carvedilol Other (See Comments)     chest pain    Tree Nut  [Macadamia Nut Oil] Other (See Comments)     Scratchy throat       REVIEW OF SYSTEMS  10 systems reviewed, pertinent positives per HPI otherwise noted to be negative. PHYSICAL EXAM  BP (!) 173/109   Pulse 87   Temp 98.7 °F (37.1 °C) (Oral)   Resp 16   Wt 205 lb 1.6 oz (93 kg)   SpO2 98%   BMI 30.29 kg/m²   GENERAL APPEARANCE: Awake and alert. Cooperative. No acute distress. HEAD: Normocephalic. Atraumatic. EYES: PERRL. EOM's grossly intact. ENT: Mucous membranes are moist.   NECK: Supple, trachea midline. HEART: RRR. Normal S1S2, no rubs, gallops, or murmurs noted  LUNGS: Respirations unlabored. CTAB. Good air exchange. No wheezes, rales, or rhonchi. Speaking comfortably in full sentences. ABDOMEN: Soft. Non-distended. Non-tender. No guarding or rebound. Normal bowel sounds. EXTREMITIES: No peripheral edema. MAEE. No acute deformities. Tremors noted on bilateral upper extremities. SKIN: Warm and dry. No acute rashes. NEUROLOGICAL: Alert and oriented X 3. CN II-XII intact. No gross facial drooping. Strength 5/5, sensation intact. Normal coordination. No pronator drift. Gait normal.   PSYCHIATRIC: Normal mood and affect. LABS  I have reviewed all labs for this visit.    Results for orders placed or performed during the hospital encounter of 02/05/21   CBC auto differential   Result Value Ref Range    WBC 4.5 4.0 - 11.0 K/uL    RBC 4.01 (L) 4.20 - 5.90 M/uL    Hemoglobin 13.2 (L) 13.5 - 17.5 g/dL    Hematocrit 40.2 (L) 40.5 - 52.5 %    .1 (H) 80.0 - 100.0 fL    MCH 32.9 26.0 - 34.0 pg    MCHC 32.9 31.0 - 36.0 g/dL    RDW 15.1 12.4 - 15.4 %    Platelets 996 551 - 637 K/uL    MPV 6.7 5.0 - 10.5 fL    Neutrophils % 71.5 %    Lymphocytes % 16.4 %    Monocytes % 10.0 %    Eosinophils % 0.8 %    Basophils % 1.3 %    Neutrophils Absolute 3.2 1.7 - 7.7 K/uL    Lymphocytes Absolute 0.7 (L) 1.0 - 5.1 K/uL    Monocytes Absolute 0.4 0.0 - 1.3 K/uL    Eosinophils Absolute 0.0 0.0 - 0.6 K/uL    Basophils Absolute 0.1 0.0 - 0.2 K/uL   Comprehensive metabolic panel   Result Value Ref Range    Sodium 140 136 - 145 mmol/L    Potassium 4.1 3.5 - 5.1 mmol/L    Chloride 93 (L) 99 - 110 mmol/L    CO2 18 (L) 21 - 32 mmol/L    Anion Gap 29 (H) 3 - 16    Glucose 83 70 - 99 mg/dL    BUN 14 7 - 20 mg/dL    CREATININE 1.1 0.9 - 1.3 mg/dL    GFR Non-African American >60 >60    GFR  >60 >60 Calcium 9.6 8.3 - 10.6 mg/dL    Total Protein 8.5 (H) 6.4 - 8.2 g/dL    Albumin 4.9 3.4 - 5.0 g/dL    Albumin/Globulin Ratio 1.4 1.1 - 2.2    Total Bilirubin 0.7 0.0 - 1.0 mg/dL    Alkaline Phosphatase 89 40 - 129 U/L     (H) 10 - 40 U/L     (H) 15 - 37 U/L    Globulin 3.6 g/dL   Lipase   Result Value Ref Range    Lipase 37.0 13.0 - 60.0 U/L   PT - INR   Result Value Ref Range    Protime 12.1 10.0 - 13.2 sec    INR 1.04 0.86 - 1.14   Ethanol   Result Value Ref Range    Ethanol Lvl 38 mg/dL   APTT   Result Value Ref Range    aPTT 34.0 24.2 - 36.2 sec   Lactic Acid, Plasma   Result Value Ref Range    Lactic Acid 3.5 (H) 0.4 - 2.0 mmol/L   Urinalysis, reflex to microscopic   Result Value Ref Range    Color, UA Yellow Straw/Yellow    Clarity, UA Clear Clear    Glucose, Ur Negative Negative mg/dL    Bilirubin Urine SMALL (A) Negative    Ketones, Urine >=80 (A) Negative mg/dL    Specific Gravity, UA >=1.030 1.005 - 1.030    Blood, Urine TRACE-INTACT (A) Negative    pH, UA 6.0 5.0 - 8.0    Protein,  (A) Negative mg/dL    Urobilinogen, Urine 0.2 <2.0 E.U./dL    Nitrite, Urine Negative Negative    Leukocyte Esterase, Urine Negative Negative    Microscopic Examination YES     Urine Type NotGiven    Lactic Acid, Plasma   Result Value Ref Range    Lactic Acid 2.2 (H) 0.4 - 2.0 mmol/L   Microscopic Urinalysis   Result Value Ref Range    Hyaline Casts, UA 6-10 (A) 0 - 2 /LPF    Mucus, UA Rare (A) None Seen /LPF    WBC, UA 21-50 (A) 0 - 5 /HPF    RBC, UA 0-2 0 - 4 /HPF    Epithelial Cells, UA 2-5 0 - 5 /HPF    Bacteria, UA Rare (A) None Seen /HPF         RADIOLOGY  X-RAYS:  I have reviewed radiologic plain film image(s). ALL OTHER NON-PLAIN FILM IMAGES SUCH AS CT, ULTRASOUND AND MRI HAVE BEEN READ BY THE RADIOLOGIST. No orders to display              Rechecks: Physical assessment performed. Patient states he is feeling better, reduce nausea and vomiting and tolerating oral fluid intake.   Improvement of tremors noted. ED COURSE/MDM  Patient seen and evaluated. Old records reviewed. Labs and imaging reviewed and results discussed with patient. Coag studies were within normal limits. Lactic acid 3.3, repeat lactic acid 2.2, UA showed ketones, trace blood and proteins, patient denied any UTI symptoms,urine was sent for culture. Patient was given bolus NS IV fluids, Zofran, Protonix, and thiamine in the ED with good symptomatic relief. He scored 10 on the CIWA and was given Ativan 1 mg. Noted to have elevated BP readings in the ED, patient states he was not able to take blood pressure medication due to the nausea and vomiting patient was given 25 mg Lopressor. Patient was reassessed as noted above . No acute pathology was noted and pt is safe for discharge home to follow up with PCP and [facility] for outpatient management of withdrawal.His home BP medication was adjusted to 12.5 mg twice daily, patient was instructed to follow-up with PCP regarding hypertension. Patient instructed to take his previously prescribed Valium as needed for symptoms of withdrawal.  He was discharged home with omeprazole and Zofran. Plan of care discussed with patient and family. Patient and family in agreement with plan. Patient was given scripts for the following medications. I counseled patient how to take these medications. Discharge Medication List as of 2/5/2021  5:08 PM      START taking these medications    Details   omeprazole (PRILOSEC) 20 MG delayed release capsule Take 1 capsule by mouth every morning (before breakfast), Disp-30 capsule, R-0Print      ondansetron (ZOFRAN) 4 MG tablet Take 1 tablet by mouth 3 times daily as needed for Nausea or Vomiting, Disp-30 tablet, R-0Print             CLINICAL IMPRESSION  1. Non-intractable vomiting with nausea, unspecified vomiting type    2. Alcohol withdrawal syndrome without complication ([facility] [address]    3.  Hypertension, unspecified type        Blood pressure (!) 173/109,

## 2021-02-05 NOTE — ED NOTES
Repeat lactic acid drawn from existing hep lock with waste tube and flushed after without difficulty and pt drinking water at this time and tolerating well.       Marci Barton RN  02/05/21 [id number]

## 2021-02-05 NOTE — ED NOTES
Pt states that he is feeling slightly better and no vomiting since medications given. Pt has about 300 ml left of saline and tolerated ice chips so far and is aware that we need a urine specimen and has urinal at bedside.       Etelvina Lott RN  02/05/21 2363

## 2021-02-07 LAB — URINE CULTURE, ROUTINE: NORMAL

## 2021-02-07 NOTE — ED PROVIDER NOTES
This patient was seen with resident Dr. Ej Fajardo. I agree with his assessment, treatment and plan. Patient presented with nausea/vomiting and mild alcohol withdrawal.  Blood pressure was elevated as he appears to be taking only half his dose of Lopressor. The patient was treated with PPI, Zofran, thiamine and a single dose of Ativan with improvement of his symptoms. He was offered admission for inpatient treatment of alcohol withdrawal but declined stating he is currently in an outpatient program through [facility]. Initial lab work showed anion gap of 29 with CO2 18 and initial lactic acid of 3.5. Repeat lactic acid was 2.2 after IV fluids. The patient had some pyuria without UTI symptoms. Urine culture was sent and is pending. He does have Valium and thiamine at home. He was given prescription for a PPI and Zofran for nausea. And advised to increase his metoprolol dose to 12.5 mg twice a day. Recommended follow-up with his outpatient treatment facility.       Rosales Carbone MD  02/07/21 2514

## 2021-03-24 ENCOUNTER — OFFICE VISIT (OUTPATIENT)
Dept: PRIMARY CARE CLINIC | Age: 41
End: 2021-03-24
Payer: MEDICAID

## 2021-03-24 DIAGNOSIS — Z20.822 SUSPECTED COVID-19 VIRUS INFECTION: Primary | ICD-10-CM

## 2021-03-24 PROCEDURE — 99211 OFF/OP EST MAY X REQ PHY/QHP: CPT | Performed by: NURSE PRACTITIONER

## 2021-03-24 NOTE — PROGRESS NOTES
Lyubov Powell received a viral test for COVID-19. They were educated on isolation and quarantine as appropriate. For any symptoms, they were directed to seek care from their PCP, given contact information to establish with a doctor, directed to an urgent care or the emergency room.

## 2021-03-25 LAB — SARS-COV-2: NOT DETECTED

## 2021-04-24 ENCOUNTER — HOSPITAL ENCOUNTER (EMERGENCY)
Age: 41
Discharge: HOME | End: 2021-04-24
Attending: STUDENT IN AN ORGANIZED HEALTH CARE EDUCATION/TRAINING PROGRAM
Payer: MEDICAID

## 2021-04-24 VITALS
TEMPERATURE: 98.9 F | BODY MASS INDEX: 28.51 KG/M2 | WEIGHT: 192.5 LBS | OXYGEN SATURATION: 98 % | RESPIRATION RATE: 20 BRPM | HEIGHT: 69 IN | SYSTOLIC BLOOD PRESSURE: 132 MMHG | DIASTOLIC BLOOD PRESSURE: 90 MMHG | HEART RATE: 81 BPM

## 2021-04-24 DIAGNOSIS — R11.2 NON-INTRACTABLE VOMITING WITH NAUSEA, UNSPECIFIED VOMITING TYPE: Primary | ICD-10-CM

## 2021-04-24 LAB
A/G RATIO: 1.2 (ref 1.1–2.2)
ALBUMIN SERPL-MCNC: 4.8 G/DL (ref 3.4–5)
ALP BLD-CCNC: 134 U/L (ref 40–129)
ALT SERPL-CCNC: 158 U/L (ref 10–40)
ANION GAP SERPL CALCULATED.3IONS-SCNC: 28 MMOL/L (ref 3–16)
AST SERPL-CCNC: 201 U/L (ref 15–37)
BASOPHILS ABSOLUTE: 0 K/UL (ref 0–0.2)
BASOPHILS RELATIVE PERCENT: 0.6 %
BILIRUB SERPL-MCNC: 1.2 MG/DL (ref 0–1)
BUN BLDV-MCNC: 16 MG/DL (ref 7–20)
CALCIUM SERPL-MCNC: 9.5 MG/DL (ref 8.3–10.6)
CHLORIDE BLD-SCNC: 86 MMOL/L (ref 99–110)
CO2: 21 MMOL/L (ref 21–32)
CREAT SERPL-MCNC: 1.2 MG/DL (ref 0.9–1.3)
EOSINOPHILS ABSOLUTE: 0 K/UL (ref 0–0.6)
EOSINOPHILS RELATIVE PERCENT: 0.4 %
GFR AFRICAN AMERICAN: >60
GFR NON-AFRICAN AMERICAN: >60
GLOBULIN: 4.1 G/DL
GLUCOSE BLD-MCNC: 89 MG/DL (ref 70–99)
HCT VFR BLD CALC: 42.4 % (ref 40.5–52.5)
HEMOGLOBIN: 14.3 G/DL (ref 13.5–17.5)
LACTIC ACID: 2.3 MMOL/L (ref 0.4–2)
LACTIC ACID: 2.4 MMOL/L (ref 0.4–2)
LACTIC ACID: 2.8 MMOL/L (ref 0.4–2)
LIPASE: 61 U/L (ref 13–60)
LYMPHOCYTES ABSOLUTE: 1.2 K/UL (ref 1–5.1)
LYMPHOCYTES RELATIVE PERCENT: 20.1 %
MAGNESIUM: 2.1 MG/DL (ref 1.8–2.4)
MCH RBC QN AUTO: 33.4 PG (ref 26–34)
MCHC RBC AUTO-ENTMCNC: 33.6 G/DL (ref 31–36)
MCV RBC AUTO: 99.3 FL (ref 80–100)
MONOCYTES ABSOLUTE: 0.7 K/UL (ref 0–1.3)
MONOCYTES RELATIVE PERCENT: 11.6 %
NEUTROPHILS ABSOLUTE: 4.1 K/UL (ref 1.7–7.7)
NEUTROPHILS RELATIVE PERCENT: 67.3 %
PDW BLD-RTO: 16.6 % (ref 12.4–15.4)
PHOSPHORUS: 3.5 MG/DL (ref 2.5–4.9)
PLATELET # BLD: 240 K/UL (ref 135–450)
PMV BLD AUTO: 7 FL (ref 5–10.5)
POTASSIUM REFLEX MAGNESIUM: 3.8 MMOL/L (ref 3.5–5.1)
RBC # BLD: 4.27 M/UL (ref 4.2–5.9)
SODIUM BLD-SCNC: 135 MMOL/L (ref 136–145)
TOTAL PROTEIN: 8.9 G/DL (ref 6.4–8.2)
WBC # BLD: 6.1 K/UL (ref 4–11)

## 2021-04-24 PROCEDURE — 83605 ASSAY OF LACTIC ACID: CPT

## 2021-04-24 PROCEDURE — 83690 ASSAY OF LIPASE: CPT

## 2021-04-24 PROCEDURE — 84100 ASSAY OF PHOSPHORUS: CPT

## 2021-04-24 PROCEDURE — 2500000003 HC RX 250 WO HCPCS: Performed by: STUDENT IN AN ORGANIZED HEALTH CARE EDUCATION/TRAINING PROGRAM

## 2021-04-24 PROCEDURE — 93005 ELECTROCARDIOGRAM TRACING: CPT | Performed by: STUDENT IN AN ORGANIZED HEALTH CARE EDUCATION/TRAINING PROGRAM

## 2021-04-24 PROCEDURE — 6360000002 HC RX W HCPCS: Performed by: STUDENT IN AN ORGANIZED HEALTH CARE EDUCATION/TRAINING PROGRAM

## 2021-04-24 PROCEDURE — 85025 COMPLETE CBC W/AUTO DIFF WBC: CPT

## 2021-04-24 PROCEDURE — 6370000000 HC RX 637 (ALT 250 FOR IP): Performed by: STUDENT IN AN ORGANIZED HEALTH CARE EDUCATION/TRAINING PROGRAM

## 2021-04-24 PROCEDURE — 96374 THER/PROPH/DIAG INJ IV PUSH: CPT

## 2021-04-24 PROCEDURE — 80053 COMPREHEN METABOLIC PANEL: CPT

## 2021-04-24 PROCEDURE — 2580000003 HC RX 258: Performed by: STUDENT IN AN ORGANIZED HEALTH CARE EDUCATION/TRAINING PROGRAM

## 2021-04-24 PROCEDURE — 96375 TX/PRO/DX INJ NEW DRUG ADDON: CPT

## 2021-04-24 PROCEDURE — 83735 ASSAY OF MAGNESIUM: CPT

## 2021-04-24 PROCEDURE — 99283 EMERGENCY DEPT VISIT LOW MDM: CPT

## 2021-04-24 RX ORDER — ONDANSETRON 4 MG/1
4 TABLET, ORALLY DISINTEGRATING ORAL EVERY 8 HOURS PRN
Qty: 20 TABLET | Refills: 0 | Status: SHIPPED | OUTPATIENT
Start: 2021-04-24 | End: 2021-05-14

## 2021-04-24 RX ORDER — DROPERIDOL 2.5 MG/ML
1.25 INJECTION, SOLUTION INTRAMUSCULAR; INTRAVENOUS ONCE
Status: COMPLETED | OUTPATIENT
Start: 2021-04-24 | End: 2021-04-24

## 2021-04-24 RX ORDER — 0.9 % SODIUM CHLORIDE 0.9 %
1000 INTRAVENOUS SOLUTION INTRAVENOUS ONCE
Status: COMPLETED | OUTPATIENT
Start: 2021-04-24 | End: 2021-04-24

## 2021-04-24 RX ORDER — DIPHENHYDRAMINE HYDROCHLORIDE 50 MG/ML
25 INJECTION INTRAMUSCULAR; INTRAVENOUS ONCE
Status: COMPLETED | OUTPATIENT
Start: 2021-04-24 | End: 2021-04-24

## 2021-04-24 RX ORDER — ONDANSETRON 4 MG/1
4 TABLET, FILM COATED ORAL ONCE
Status: COMPLETED | OUTPATIENT
Start: 2021-04-24 | End: 2021-04-24

## 2021-04-24 RX ADMIN — ONDANSETRON HYDROCHLORIDE 4 MG: 4 TABLET, FILM COATED ORAL at 20:45

## 2021-04-24 RX ADMIN — FOLIC ACID: 5 INJECTION, SOLUTION INTRAMUSCULAR; INTRAVENOUS; SUBCUTANEOUS at 20:54

## 2021-04-24 RX ADMIN — DROPERIDOL 1.25 MG: 2.5 INJECTION, SOLUTION INTRAMUSCULAR; INTRAVENOUS at 22:34

## 2021-04-24 RX ADMIN — DIPHENHYDRAMINE HYDROCHLORIDE 25 MG: 50 INJECTION, SOLUTION INTRAMUSCULAR; INTRAVENOUS at 22:34

## 2021-04-24 RX ADMIN — SODIUM CHLORIDE 1000 ML: 9 INJECTION, SOLUTION INTRAVENOUS at 20:45

## 2021-04-24 ASSESSMENT — ENCOUNTER SYMPTOMS
APNEA: 0
ABDOMINAL PAIN: 0
NAUSEA: 1
EYE DISCHARGE: 0
ANAL BLEEDING: 0
WHEEZING: 0
CHOKING: 0
VOMITING: 1
COUGH: 0
SHORTNESS OF BREATH: 0
STRIDOR: 0
CONSTIPATION: 0
ABDOMINAL DISTENTION: 0
EYE ITCHING: 0

## 2021-04-24 NOTE — ED PROVIDER NOTES
[address] [facility] RESIDENT NOTE       Date of evaluation: 4/24/2021    Chief Complaint     Emesis and Dehydration      History of Present Illness     Lashell Dela Cruz is a 46 y.o. male medical history of alcohol abuse, nausea, hypertension who presented to the hospital with 3 days of nausea and vomiting. The patient states that he relapsed back into alcohol abuse after 2 months of being sober around 3 weeks ago and has been drinking vodka with water for the past 3 weeks. The patient states he cannot remember when he had a proper meal and has been unable to keep anything down for the past 3 weeks. He states his last drink was 1 hour ago. He denies any chest pain, shortness of breath, abdominal pain or weakness. He goes to rehab at [address] [place] [address] outpatient rehab however because of his relapse they recommended inpatient rehab      Review of Systems     Review of Systems   Constitutional: Negative for appetite change, diaphoresis, fatigue and fever. HENT: Negative for congestion, dental problem and ear discharge. Eyes: Negative for discharge and itching. Respiratory: Negative for apnea, cough, choking, shortness of breath, wheezing and stridor. Cardiovascular: Negative for chest pain, palpitations and leg swelling. Gastrointestinal: Positive for nausea and vomiting. Negative for abdominal distention, abdominal pain, anal bleeding and constipation. Endocrine: Negative for cold intolerance and heat intolerance. Genitourinary: Negative for difficulty urinating, dysuria and enuresis. Neurological: Negative for dizziness, seizures, facial asymmetry, speech difficulty, light-headedness and numbness. Psychiatric/Behavioral: Negative for agitation, behavioral problems and hallucinations. The patient is not hyperactive. Past Medical, Surgical, Family, and Social History     He has a past medical history of Alcohol abuse and Hypertension.   He has no past surgical history on file. His family history includes Colon Cancer in his father; Glaucoma in his father. He reports that he has been smoking e-cigarettes. He has been smoking about 0.00 packs per day. He has never used smokeless tobacco. He reports current alcohol use. He reports that he does not use drugs. Medications     Previous Medications    METOPROLOL TARTRATE (LOPRESSOR) 25 MG TABLET    Take 1 tablet by mouth 2 times daily    ONDANSETRON (ZOFRAN) 4 MG TABLET    Take 1 tablet by mouth 3 times daily as needed for Nausea or Vomiting       Allergies     He is allergic to amlodipine; banana; carvedilol; and tree nut  [macadamia nut oil]. Physical Exam     INITIAL VITALS: BP: (!) 155/103, Temp: 98.9 °F (37.2 °C), Pulse: 108, Resp: 16, SpO2: 96 %   Physical Exam  Constitutional:       General: He is not in acute distress. Appearance: Normal appearance. He is not ill-appearing. HENT:      Head: Normocephalic and atraumatic. Mouth/Throat:      Mouth: Mucous membranes are moist.   Eyes:      Extraocular Movements: Extraocular movements intact. Pupils: Pupils are equal, round, and reactive to light. Cardiovascular:      Rate and Rhythm: Tachycardia present. Pulses: Normal pulses. Heart sounds: No murmur. No friction rub. No gallop. Pulmonary:      Effort: Pulmonary effort is normal.   Abdominal:      General: There is no distension. Palpations: Abdomen is soft. Tenderness: There is no right CVA tenderness or left CVA tenderness. Musculoskeletal:         General: No swelling. Skin:     General: Skin is warm. Capillary Refill: Capillary refill takes less than 2 seconds. Neurological:      Mental Status: He is alert and oriented to person, place, and time.    Psychiatric:         Mood and Affect: Mood normal.         Behavior: Behavior normal.            Diagnostic Results     EKG   Sinus rhythm, no ST elevations, no bundle-branch blocks, no abnormal T waves    RADIOLOGY:  No orders to display       LABS:   Results for orders placed or performed during the hospital encounter of 04/24/21   CBC auto differential   Result Value Ref Range    WBC 6.1 4.0 - 11.0 K/uL    RBC 4.27 4.20 - 5.90 M/uL    Hemoglobin 14.3 13.5 - 17.5 g/dL    Hematocrit 42.4 40.5 - 52.5 %    MCV 99.3 80.0 - 100.0 fL    MCH 33.4 26.0 - 34.0 pg    MCHC 33.6 31.0 - 36.0 g/dL    RDW 16.6 (H) 12.4 - 15.4 %    Platelets 932 701 - 640 K/uL    MPV 7.0 5.0 - 10.5 fL    Neutrophils % 67.3 %    Lymphocytes % 20.1 %    Monocytes % 11.6 %    Eosinophils % 0.4 %    Basophils % 0.6 %    Neutrophils Absolute 4.1 1.7 - 7.7 K/uL    Lymphocytes Absolute 1.2 1.0 - 5.1 K/uL    Monocytes Absolute 0.7 0.0 - 1.3 K/uL    Eosinophils Absolute 0.0 0.0 - 0.6 K/uL    Basophils Absolute 0.0 0.0 - 0.2 K/uL   Comprehensive Metabolic Panel w/ Reflex to MG   Result Value Ref Range    Sodium 135 (L) 136 - 145 mmol/L    Potassium reflex Magnesium 3.8 3.5 - 5.1 mmol/L    Chloride 86 (L) 99 - 110 mmol/L    CO2 21 21 - 32 mmol/L    Anion Gap 28 (H) 3 - 16    Glucose 89 70 - 99 mg/dL    BUN 16 7 - 20 mg/dL    CREATININE 1.2 0.9 - 1.3 mg/dL    GFR Non-African American >60 >60    GFR African American >60 >60    Calcium 9.5 8.3 - 10.6 mg/dL    Total Protein 8.9 (H) 6.4 - 8.2 g/dL    Albumin 4.8 3.4 - 5.0 g/dL    Albumin/Globulin Ratio 1.2 1.1 - 2.2    Total Bilirubin 1.2 (H) 0.0 - 1.0 mg/dL    Alkaline Phosphatase 134 (H) 40 - 129 U/L     (H) 10 - 40 U/L     (H) 15 - 37 U/L    Globulin 4.1 g/dL   Lactic acid, plasma   Result Value Ref Range    Lactic Acid 2.8 (H) 0.4 - 2.0 mmol/L   Lipase   Result Value Ref Range    Lipase 61.0 (H) 13.0 - 60.0 U/L   Magnesium   Result Value Ref Range    Magnesium 2.10 1.80 - 2.40 mg/dL   Phosphorus   Result Value Ref Range    Phosphorus 3.5 2.5 - 4.9 mg/dL   Lactic acid, plasma   Result Value Ref Range    Lactic Acid 2.4 (H) 0.4 - 2.0 mmol/L   Lactic acid, plasma   Result Value Ref Range Lactic Acid 2.3 (H) 0.4 - 2.0 mmol/L       ED BEDSIDE ULTRASOUND:  None    RECENT VITALS:  BP: 125/77, Temp: 98.9 °F (37.2 °C),Pulse: 88, Resp: 20, SpO2: 95 %     Procedures     None    ED Course     Nursing Notes, Past Medical Hx, Past Surgical Hx, Social Hx, Allergies, and FamilyHx were reviewed. The patient was giventhe following medications:  Orders Placed This Encounter   Medications    sodium chloride 0.9 % 50 mL with folic acid 1 mg, thiamine 100 mg    0.9 % sodium chloride bolus    ondansetron (ZOFRAN) tablet 4 mg    droperidol (INAPSINE) injection 1.25 mg    diphenhydrAMINE (BENADRYL) injection 25 mg    ondansetron (ZOFRAN ODT) 4 MG disintegrating tablet     Sig: Take 1 tablet by mouth every 8 hours as needed for Nausea     Dispense:  20 tablet     Refill:  0       CONSULTS:  None    MEDICAL DECISION MAKING / ASSESSMENT / Wolfgang Hatchet is a 46 y.o. male presented with nausea and vomiting. Labs were significant for an anion gap of 28 and lactic acidosis of 2.8. This is likely secondary to alcoholic ketoacidosis. We will administer fluids and a banana bag. His lactic acid after administration of fluids was normalizing. His magnesium and phosphorus were within normal limits. He was able to tolerate p.o. intake. At this time he is clinically stable for discharge. I will discharge him on some Zofran for nausea and vomiting. We will also instruct him to return to the ED should he develop any signs of withdrawal including but not limited to sweating and tremors. I will also have him follow-up with the internal medicine resident clinic so his care can be followed. This patient was also evaluated by the attending physician. All care plans were discussed and agreed upon. Clinical Impression     1.  Non-intractable vomiting with nausea, unspecified vomiting type        Disposition     PAT[facility]:  [facility]  [address] [facility] 231  [phone]    If symptoms worsen    Drew Palmer MD  [facility]  [phone]    Schedule an appointment as soon as possible for a visit         DISCHARGE MEDICATIONS:  New Prescriptions    ONDANSETRON (ZOFRAN ODT) 4 MG DISINTEGRATING TABLET    Take 1 tablet by mouth every 8 hours as needed for Nausea       DISPOSITION     To be discharged     Mundo Storey MD  Resident  04/24/21 1618

## 2021-04-24 NOTE — ED TRIAGE NOTES
Pt came into the ED with c/o nausea and vomiting. Pt has Hx of alcohol abuse and states his last drink was approx an hour ago. Denies SOB, CP, anxiety.

## 2021-04-25 LAB
EKG ATRIAL RATE: 91 BPM
EKG DIAGNOSIS: NORMAL
EKG P AXIS: 49 DEGREES
EKG P-R INTERVAL: 172 MS
EKG Q-T INTERVAL: 368 MS
EKG QRS DURATION: 90 MS
EKG QTC CALCULATION (BAZETT): 452 MS
EKG R AXIS: 63 DEGREES
EKG T AXIS: 57 DEGREES
EKG VENTRICULAR RATE: 91 BPM

## 2021-04-25 NOTE — ED PROVIDER NOTES
ED Attending Attestation Note     Date of evaluation: 4/24/2021    This patient was seen by the resident. I have seen and examined the patient, agree with the workup, evaluation, management and diagnosis. The care plan has been discussed. I have reviewed the ECG and concur with the resident's interpretation. My assessment reveals 47 y/o M who presents for n/v.  VSS and afebrile. Abd soft and nontender. Labs notable for elevated LFTs (similar to prior), no leukocytosis. Lactate trended down with fluids. Tolerating PO intake. Stable for discharge with ED precautions.      Kevan Malone MD  04/24/21 [id number]

## 2021-05-14 ENCOUNTER — HOSPITAL ENCOUNTER (EMERGENCY)
Age: 41
Discharge: HOME | End: 2021-05-14
Attending: EMERGENCY MEDICINE
Payer: MEDICAID

## 2021-05-14 VITALS
DIASTOLIC BLOOD PRESSURE: 104 MMHG | HEIGHT: 69 IN | HEART RATE: 86 BPM | WEIGHT: 203 LBS | BODY MASS INDEX: 30.07 KG/M2 | RESPIRATION RATE: 18 BRPM | SYSTOLIC BLOOD PRESSURE: 171 MMHG | TEMPERATURE: 98.2 F | OXYGEN SATURATION: 95 %

## 2021-05-14 DIAGNOSIS — F10.930 ALCOHOL WITHDRAWAL SYNDROME WITHOUT COMPLICATION (HCC): Primary | ICD-10-CM

## 2021-05-14 LAB
ANION GAP SERPL CALCULATED.3IONS-SCNC: 24 MMOL/L (ref 3–16)
BUN BLDV-MCNC: 15 MG/DL (ref 7–20)
CALCIUM SERPL-MCNC: 9.7 MG/DL (ref 8.3–10.6)
CHLORIDE BLD-SCNC: 89 MMOL/L (ref 99–110)
CO2: 24 MMOL/L (ref 21–32)
CREAT SERPL-MCNC: 1.2 MG/DL (ref 0.9–1.3)
GFR AFRICAN AMERICAN: >60
GFR NON-AFRICAN AMERICAN: >60
GLUCOSE BLD-MCNC: 119 MG/DL (ref 70–99)
POTASSIUM REFLEX MAGNESIUM: 4.4 MMOL/L (ref 3.5–5.1)
SODIUM BLD-SCNC: 137 MMOL/L (ref 136–145)

## 2021-05-14 PROCEDURE — 96374 THER/PROPH/DIAG INJ IV PUSH: CPT

## 2021-05-14 PROCEDURE — 80048 BASIC METABOLIC PNL TOTAL CA: CPT

## 2021-05-14 PROCEDURE — 99283 EMERGENCY DEPT VISIT LOW MDM: CPT

## 2021-05-14 PROCEDURE — 2580000003 HC RX 258: Performed by: EMERGENCY MEDICINE

## 2021-05-14 PROCEDURE — 6360000002 HC RX W HCPCS: Performed by: EMERGENCY MEDICINE

## 2021-05-14 PROCEDURE — 36415 COLL VENOUS BLD VENIPUNCTURE: CPT

## 2021-05-14 PROCEDURE — 6370000000 HC RX 637 (ALT 250 FOR IP): Performed by: EMERGENCY MEDICINE

## 2021-05-14 RX ORDER — LORAZEPAM 1 MG/1
1 TABLET ORAL
Status: DISCONTINUED | OUTPATIENT
Start: 2021-05-14 | End: 2021-05-14 | Stop reason: HOSPADM

## 2021-05-14 RX ORDER — LORAZEPAM 2 MG/ML
4 INJECTION INTRAMUSCULAR
Status: DISCONTINUED | OUTPATIENT
Start: 2021-05-14 | End: 2021-05-14 | Stop reason: HOSPADM

## 2021-05-14 RX ORDER — SODIUM CHLORIDE 9 MG/ML
25 INJECTION, SOLUTION INTRAVENOUS PRN
Status: DISCONTINUED | OUTPATIENT
Start: 2021-05-14 | End: 2021-05-14 | Stop reason: HOSPADM

## 2021-05-14 RX ORDER — 0.9 % SODIUM CHLORIDE 0.9 %
1000 INTRAVENOUS SOLUTION INTRAVENOUS ONCE
Status: COMPLETED | OUTPATIENT
Start: 2021-05-14 | End: 2021-05-14

## 2021-05-14 RX ORDER — LORAZEPAM 1 MG/1
TABLET ORAL
Qty: 20 TABLET | Refills: 0 | Status: SHIPPED | OUTPATIENT
Start: 2021-05-14 | End: 2021-06-13

## 2021-05-14 RX ORDER — LORAZEPAM 2 MG/ML
3 INJECTION INTRAMUSCULAR
Status: DISCONTINUED | OUTPATIENT
Start: 2021-05-14 | End: 2021-05-14 | Stop reason: HOSPADM

## 2021-05-14 RX ORDER — LORAZEPAM 2 MG/ML
2 INJECTION INTRAMUSCULAR
Status: DISCONTINUED | OUTPATIENT
Start: 2021-05-14 | End: 2021-05-14 | Stop reason: HOSPADM

## 2021-05-14 RX ORDER — LORAZEPAM 1 MG/1
4 TABLET ORAL
Status: DISCONTINUED | OUTPATIENT
Start: 2021-05-14 | End: 2021-05-14 | Stop reason: HOSPADM

## 2021-05-14 RX ORDER — LORAZEPAM 1 MG/1
3 TABLET ORAL
Status: DISCONTINUED | OUTPATIENT
Start: 2021-05-14 | End: 2021-05-14 | Stop reason: HOSPADM

## 2021-05-14 RX ORDER — ONDANSETRON 2 MG/ML
4 INJECTION INTRAMUSCULAR; INTRAVENOUS ONCE
Status: COMPLETED | OUTPATIENT
Start: 2021-05-14 | End: 2021-05-14

## 2021-05-14 RX ORDER — SODIUM CHLORIDE 0.9 % (FLUSH) 0.9 %
5-40 SYRINGE (ML) INJECTION EVERY 12 HOURS SCHEDULED
Status: DISCONTINUED | OUTPATIENT
Start: 2021-05-14 | End: 2021-05-14 | Stop reason: HOSPADM

## 2021-05-14 RX ORDER — SODIUM CHLORIDE 0.9 % (FLUSH) 0.9 %
5-40 SYRINGE (ML) INJECTION PRN
Status: DISCONTINUED | OUTPATIENT
Start: 2021-05-14 | End: 2021-05-14 | Stop reason: HOSPADM

## 2021-05-14 RX ORDER — LORAZEPAM 2 MG/ML
1 INJECTION INTRAMUSCULAR
Status: DISCONTINUED | OUTPATIENT
Start: 2021-05-14 | End: 2021-05-14 | Stop reason: HOSPADM

## 2021-05-14 RX ORDER — ONDANSETRON 4 MG/1
8 TABLET, FILM COATED ORAL 3 TIMES DAILY PRN
Qty: 30 TABLET | Refills: 0 | Status: SHIPPED | OUTPATIENT
Start: 2021-05-14 | End: 2021-07-16 | Stop reason: ALTCHOICE

## 2021-05-14 RX ORDER — LORAZEPAM 1 MG/1
2 TABLET ORAL
Status: DISCONTINUED | OUTPATIENT
Start: 2021-05-14 | End: 2021-05-14 | Stop reason: HOSPADM

## 2021-05-14 RX ADMIN — LORAZEPAM 3 MG: 1 TABLET ORAL at 16:03

## 2021-05-14 RX ADMIN — SODIUM CHLORIDE 1000 ML: 9 INJECTION, SOLUTION INTRAVENOUS at 16:32

## 2021-05-14 RX ADMIN — ONDANSETRON 4 MG: 2 INJECTION INTRAMUSCULAR; INTRAVENOUS at 18:27

## 2021-05-14 ASSESSMENT — ENCOUNTER SYMPTOMS
COUGH: 0
DIARRHEA: 0
SHORTNESS OF BREATH: 0
NAUSEA: 1
ABDOMINAL PAIN: 0
VOMITING: 1

## 2021-05-14 NOTE — ED NOTES
Patient discharged to home via family. Written discharge instructions reviewed with understanding. Copy of AVS and signed prescription sent home with patient. Patient able to walk from ED without assistance.        Rachel Ochoa RN  05/14/21 5689

## 2021-05-14 NOTE — ED PROVIDER NOTES
[address] [facility] 71 ENCOUNTER          ATTENDING PHYSICIAN NOTE       Date of evaluation: 5/14/2021    Chief Complaint     Alcohol Intoxication (last drink 11 pm)      History of Present Illness     Nitin Randall is a 46 y.o. male who presents with complaints of feeling as if he is going through alcohol withdrawal.  The patient was drinking 1/5 of alcohol per day up until last night around midnight. He began feeling agitated this morning and tremulous. He has also been experiencing some nausea and vomiting with it. He denies any pain. He does not have a history of seizures with alcohol withdrawal in the past.  He has been able to manage withdrawal with medication at home in the past and plans to get into [facility] for continued treatment. He is not having any hallucinations or tactile sensations. Review of Systems     Review of Systems   Constitutional: Negative for chills and fever. Respiratory: Negative for cough and shortness of breath. Cardiovascular: Negative for chest pain. Gastrointestinal: Positive for nausea and vomiting. Negative for abdominal pain and diarrhea. Genitourinary: Negative for difficulty urinating. Neurological: Positive for tremors. Negative for seizures and headaches. Psychiatric/Behavioral: Positive for sleep disturbance. Negative for agitation and hallucinations. The patient is nervous/anxious. All other systems reviewed and are negative. Past Medical, Surgical, Family, and Social History     He has a past medical history of Alcohol abuse, Anxiety, and Hypertension. He has no past surgical history on file. His family history includes Colon Cancer in his father; Glaucoma in his father. He reports that he has been smoking e-cigarettes. He has been smoking about 0.00 packs per day. He has never used smokeless tobacco. He reports current alcohol use. He reports that he does not use drugs.     Medications     Discharge Medication List as of 5/14/2021  6:23 PM      CONTINUE these medications which have NOT CHANGED    Details   metoprolol tartrate (LOPRESSOR) 25 MG tablet Take 1 tablet by mouth 2 times daily, Disp-30 tablet, R-0Print             Allergies     He is allergic to amlodipine; banana; carvedilol; and tree nut  [macadamia nut oil]. Physical Exam     INITIAL VITALS: BP: (!) 165/112, Temp: 98.2 °F (36.8 °C), Pulse: 84, Resp: 18, SpO2: 95 %   Physical Exam  Vitals signs and nursing note reviewed. Constitutional:       General: He is not in acute distress. Appearance: He is well-developed. He is not diaphoretic. Eyes:      Pupils: Pupils are equal, round, and reactive to light. Cardiovascular:      Rate and Rhythm: Normal rate and regular rhythm. Pulmonary:      Effort: Pulmonary effort is normal.   Musculoskeletal: Normal range of motion. Skin:     General: Skin is warm and dry. Findings: No erythema or rash. Neurological:      Mental Status: He is alert and oriented to person, place, and time. Motor: Tremor present. No seizure activity. Psychiatric:         Mood and Affect: Mood normal.         Behavior: Behavior normal.         Thought Content:  Thought content normal.         Judgment: Judgment normal.         Diagnostic Results     RADIOLOGY:  No orders to display       LABS:   Results for orders placed or performed during the hospital encounter of 99/17/66   Basic Metabolic Panel w/ Reflex to MG   Result Value Ref Range    Sodium 137 136 - 145 mmol/L    Potassium reflex Magnesium 4.4 3.5 - 5.1 mmol/L    Chloride 89 (L) 99 - 110 mmol/L    CO2 24 21 - 32 mmol/L    Anion Gap 24 (H) 3 - 16    Glucose 119 (H) 70 - 99 mg/dL    BUN 15 7 - 20 mg/dL    CREATININE 1.2 0.9 - 1.3 mg/dL    GFR Non-African American >60 >60    GFR African American >60 >60    Calcium 9.7 8.3 - 10.6 mg/dL       RECENT VITALS:  BP: (!) 171/104,Temp: 98.2 °F (36.8 °C), Pulse: 86, Resp: 18, SpO2: 95 %     ED Course     Nursing Notes, Past Medical Hx, Past Surgical Hx, Social Hx,Allergies, and Family Hx were reviewed. patient was given the following medications:  Orders Placed This Encounter   Medications    sodium chloride flush 0.9 % injection 5-40 mL    sodium chloride flush 0.9 % injection 5-40 mL    0.9 % sodium chloride infusion    OR Linked Order Group     LORazepam (ATIVAN) tablet 1 mg     LORazepam (ATIVAN) injection 1 mg     LORazepam (ATIVAN) tablet 2 mg     LORazepam (ATIVAN) injection 2 mg     LORazepam (ATIVAN) tablet 3 mg     LORazepam (ATIVAN) injection 3 mg     LORazepam (ATIVAN) tablet 4 mg     LORazepam (ATIVAN) injection 4 mg    0.9 % sodium chloride bolus    ondansetron (ZOFRAN) injection 4 mg    ondansetron (ZOFRAN) 4 MG tablet     Sig: Take 2 tablets by mouth 3 times daily as needed for Nausea or Vomiting     Dispense:  30 tablet     Refill:  0    LORazepam (ATIVAN) 1 MG tablet     Sig: Take one tablet 3 times a day for 3 days, then one tablet 2 times a day for 3 days, then one tablet daily for the next 5 days. Dispense:  20 tablet     Refill:  0       CONSULTS:  None    MEDICAL DECISIONMAKING / ASSESSMENT / PLAN     Sofia Hernandez is a 46 y.o. male history of alcohol use here now wanting to stop drinking and feeling symptoms of early alcohol withdrawal.  He feels that if he can get some of the symptoms under control he can use medication at home until he can get himself into [facility]. He plans to do that. Social work did see the patient and.  offered help to get him in but he knows the process and will be taking that upon himself. He scored initially a 17 on the CIWA scale and he received 3 mg of Ativan orally for that. Symptoms improved. He required an additional dose of Ativan and was feeling much improved and received Zofran for his nausea. His CIWA score got down to 6 and he was felt to be stable for discharge home. He will be sent home on an Ativan taper and Zofran.   He plans to stay with

## 2021-06-16 DIAGNOSIS — I10 HYPERTENSION, UNSPECIFIED TYPE: Primary | ICD-10-CM

## 2021-07-11 DIAGNOSIS — I10 HYPERTENSION, UNSPECIFIED TYPE: ICD-10-CM

## 2021-07-16 ENCOUNTER — OFFICE VISIT (OUTPATIENT)
Dept: INTERNAL MEDICINE CLINIC | Age: 41
End: 2021-07-16
Payer: MEDICAID

## 2021-07-16 VITALS
TEMPERATURE: 97 F | DIASTOLIC BLOOD PRESSURE: 90 MMHG | HEART RATE: 93 BPM | BODY MASS INDEX: 27.11 KG/M2 | WEIGHT: 183 LBS | SYSTOLIC BLOOD PRESSURE: 121 MMHG | HEIGHT: 69 IN | OXYGEN SATURATION: 97 %

## 2021-07-16 DIAGNOSIS — I10 HYPERTENSION, UNSPECIFIED TYPE: ICD-10-CM

## 2021-07-16 DIAGNOSIS — Z72.0 TOBACCO USE: ICD-10-CM

## 2021-07-16 DIAGNOSIS — F41.9 ANXIETY: ICD-10-CM

## 2021-07-16 DIAGNOSIS — F10.10 ALCOHOL ABUSE: ICD-10-CM

## 2021-07-16 DIAGNOSIS — R05.9 COUGH: Primary | ICD-10-CM

## 2021-07-16 PROCEDURE — 99213 OFFICE O/P EST LOW 20 MIN: CPT | Performed by: STUDENT IN AN ORGANIZED HEALTH CARE EDUCATION/TRAINING PROGRAM

## 2021-07-16 RX ORDER — HYDROXYZINE PAMOATE 25 MG/1
25 CAPSULE ORAL
COMMUNITY
End: 2021-09-28

## 2021-07-16 ASSESSMENT — PATIENT HEALTH QUESTIONNAIRE - PHQ9
2. FEELING DOWN, DEPRESSED OR HOPELESS: 0
SUM OF ALL RESPONSES TO PHQ QUESTIONS 1-9: 0
1. LITTLE INTEREST OR PLEASURE IN DOING THINGS: 0
SUM OF ALL RESPONSES TO PHQ9 QUESTIONS 1 & 2: 0
SUM OF ALL RESPONSES TO PHQ QUESTIONS 1-9: 0
SUM OF ALL RESPONSES TO PHQ QUESTIONS 1-9: 0

## 2021-08-01 ENCOUNTER — APPOINTMENT (OUTPATIENT)
Dept: GENERAL RADIOLOGY | Age: 41
End: 2021-08-01
Payer: MEDICAID

## 2021-08-01 ENCOUNTER — HOSPITAL ENCOUNTER (EMERGENCY)
Age: 41
Discharge: HOME | End: 2021-08-01
Attending: EMERGENCY MEDICINE
Payer: MEDICAID

## 2021-08-01 VITALS
DIASTOLIC BLOOD PRESSURE: 74 MMHG | OXYGEN SATURATION: 100 % | HEART RATE: 84 BPM | TEMPERATURE: 98.2 F | SYSTOLIC BLOOD PRESSURE: 126 MMHG | RESPIRATION RATE: 16 BRPM

## 2021-08-01 DIAGNOSIS — F10.29 ALCOHOL DEPENDENCE WITH UNSPECIFIED ALCOHOL-INDUCED DISORDER (HCC): ICD-10-CM

## 2021-08-01 DIAGNOSIS — D53.9 MACROCYTIC ANEMIA: Primary | ICD-10-CM

## 2021-08-01 DIAGNOSIS — G62.9 SENSORY NEUROPATHY: ICD-10-CM

## 2021-08-01 LAB
ANION GAP SERPL CALCULATED.3IONS-SCNC: 19 MMOL/L (ref 3–16)
BASOPHILS ABSOLUTE: 0.1 K/UL (ref 0–0.2)
BASOPHILS RELATIVE PERCENT: 2.6 %
BUN BLDV-MCNC: 10 MG/DL (ref 7–20)
CALCIUM SERPL-MCNC: 9 MG/DL (ref 8.3–10.6)
CHLORIDE BLD-SCNC: 99 MMOL/L (ref 99–110)
CO2: 25 MMOL/L (ref 21–32)
CREAT SERPL-MCNC: 1 MG/DL (ref 0.9–1.3)
EOSINOPHILS ABSOLUTE: 0 K/UL (ref 0–0.6)
EOSINOPHILS RELATIVE PERCENT: 0.7 %
GFR AFRICAN AMERICAN: >60
GFR NON-AFRICAN AMERICAN: >60
GLUCOSE BLD-MCNC: 89 MG/DL (ref 70–99)
HCT VFR BLD CALC: 36 % (ref 40.5–52.5)
HEMOGLOBIN: 12.3 G/DL (ref 13.5–17.5)
LYMPHOCYTES ABSOLUTE: 1.8 K/UL (ref 1–5.1)
LYMPHOCYTES RELATIVE PERCENT: 46.4 %
MAGNESIUM: 1.8 MG/DL (ref 1.8–2.4)
MCH RBC QN AUTO: 34.4 PG (ref 26–34)
MCHC RBC AUTO-ENTMCNC: 34.3 G/DL (ref 31–36)
MCV RBC AUTO: 100.4 FL (ref 80–100)
MONOCYTES ABSOLUTE: 0.4 K/UL (ref 0–1.3)
MONOCYTES RELATIVE PERCENT: 9.1 %
NEUTROPHILS ABSOLUTE: 1.6 K/UL (ref 1.7–7.7)
NEUTROPHILS RELATIVE PERCENT: 41.2 %
PDW BLD-RTO: 15.3 % (ref 12.4–15.4)
PHOSPHORUS: 3.3 MG/DL (ref 2.5–4.9)
PLATELET # BLD: 201 K/UL (ref 135–450)
PMV BLD AUTO: 6.4 FL (ref 5–10.5)
POTASSIUM SERPL-SCNC: 4 MMOL/L (ref 3.5–5.1)
RBC # BLD: 3.59 M/UL (ref 4.2–5.9)
SODIUM BLD-SCNC: 143 MMOL/L (ref 136–145)
WBC # BLD: 4 K/UL (ref 4–11)

## 2021-08-01 PROCEDURE — 80048 BASIC METABOLIC PNL TOTAL CA: CPT

## 2021-08-01 PROCEDURE — 85025 COMPLETE CBC W/AUTO DIFF WBC: CPT

## 2021-08-01 PROCEDURE — 36415 COLL VENOUS BLD VENIPUNCTURE: CPT

## 2021-08-01 PROCEDURE — 99283 EMERGENCY DEPT VISIT LOW MDM: CPT

## 2021-08-01 PROCEDURE — 83735 ASSAY OF MAGNESIUM: CPT

## 2021-08-01 PROCEDURE — 84100 ASSAY OF PHOSPHORUS: CPT

## 2021-08-01 PROCEDURE — 6370000000 HC RX 637 (ALT 250 FOR IP): Performed by: EMERGENCY MEDICINE

## 2021-08-01 PROCEDURE — 82607 VITAMIN B-12: CPT

## 2021-08-01 PROCEDURE — 72100 X-RAY EXAM L-S SPINE 2/3 VWS: CPT

## 2021-08-01 RX ORDER — ONDANSETRON 4 MG/1
4 TABLET, FILM COATED ORAL EVERY 8 HOURS PRN
Qty: 30 TABLET | Refills: 0 | Status: SHIPPED | OUTPATIENT
Start: 2021-08-01 | End: 2021-09-28

## 2021-08-01 RX ORDER — LANOLIN ALCOHOL/MO/W.PET/CERES
400 CREAM (GRAM) TOPICAL DAILY
Qty: 30 TABLET | Refills: 0 | Status: SHIPPED | OUTPATIENT
Start: 2021-08-01 | End: 2021-09-28

## 2021-08-01 RX ORDER — FOLIC ACID 1 MG/1
1 TABLET ORAL ONCE
Status: COMPLETED | OUTPATIENT
Start: 2021-08-01 | End: 2021-08-01

## 2021-08-01 RX ADMIN — FOLIC ACID 1 MG: 1 TABLET ORAL at 21:21

## 2021-08-02 LAB — VITAMIN B-12: 1088 PG/ML (ref 211–911)

## 2021-08-02 NOTE — ED NOTES
Patient was in the middle of being triaged and stated \"I will be back\". Patient left the ED. RN was unable to find him.       Elijah Lewis RN  08/01/21 3717

## 2021-08-02 NOTE — ED PROVIDER NOTES
[facility] 71 ENCOUNTER          ATTENDING PHYSICIAN NOTE       Date of evaluation: 8/1/2021    Chief Complaint     Numbness      History of Present Illness     Curt Nuno is a 46 y.o. male who presents to the emergency department complaint of numbness burning type sensation on the bilateral feet. Patient reports he did have a fall approximately 3 days ago where he was playing with his kid and fell backwards onto some steps. Reports no significant back pain since that time but reports also over the course the past several days has had the onset of a burning pins-and-needles type feeling in the bilateral feet denies the same feeling in the hands denies similar history in the past.    Review of Systems     As documented in the HPI, otherwise all other systems were reviewed and were negative. Past Medical, Surgical, Family, and Social History     He has a past medical history of Alcohol abuse, Anxiety, and Hypertension. He has no past surgical history on file. His family history includes Colon Cancer in his father; Glaucoma in his father. He reports that he has been smoking e-cigarettes. He has been smoking about 0.00 packs per day. He has never used smokeless tobacco. He reports current alcohol use. He reports that he does not use drugs. Medications     Previous Medications    HYDROXYZINE (VISTARIL) 25 MG CAPSULE    Take 25 mg by mouth Every now and then for Anxiety    METOPROLOL TARTRATE (LOPRESSOR) 25 MG TABLET    TAKE 1 TABLET BY MOUTH EVERY DAY       Allergies     He is allergic to amlodipine, banana, carvedilol, and tree nut  [macadamia nut oil].     Physical Exam     INITIAL VITALS: BP: 138/88, Temp: 98.2 °F (36.8 °C), Pulse: 87, Resp: 16, SpO2: 99 %   General: 68-year-old male sitting in bed no apparent cardiorespiratory distress does appear to be mildly intoxicated  HEENT:  head is atraumatic, pupils equal round and reactive to light, sclera are clear, oropharynx is nonerythematous  Neck: supple, no lymphadenopathy  Chest: nonlabored respirations, equal chest rise bilaterally, no accessory muscle use  Cardiovascular: Regular, rate, and rhythm, 2+ radial pulses bilaterally, capillary refill 2 seconds  Abdominal: Soft, nontender, nondistended, positive bowel sounds throughout, no rebound or guarding  Skin: Warm, dry well perfused, no rashes  Musculoskeletal: no obvious deformities, no tenderness to palpation diffusely  Neurologic:  alert and oriented x4, speech is clear and intact without dysarthria, gait is intact, intact proprioception in the great toes of bilateral feet, intact sensation to light touch in bilateral lower extremities as compared to the bilateral upper extremities full strength in bilateral lower extremities, downgoing toes bilaterally    Diagnostic Results     RADIOLOGY:  XR LUMBAR SPINE (2-3 VIEWS)   Final Result      No radiographic abnormality          LABS:   Results for orders placed or performed during the hospital encounter of 08/01/21   CBC Auto Differential   Result Value Ref Range    WBC 4.0 4.0 - 11.0 K/uL    RBC 3.59 (L) 4.20 - 5.90 M/uL    Hemoglobin 12.3 (L) 13.5 - 17.5 g/dL    Hematocrit 36.0 (L) 40.5 - 52.5 %    .4 (H) 80.0 - 100.0 fL    MCH 34.4 (H) 26.0 - 34.0 pg    MCHC 34.3 31.0 - 36.0 g/dL    RDW 15.3 12.4 - 15.4 %    Platelets 402 970 - 552 K/uL    MPV 6.4 5.0 - 10.5 fL    Neutrophils % 41.2 %    Lymphocytes % 46.4 %    Monocytes % 9.1 %    Eosinophils % 0.7 %    Basophils % 2.6 %    Neutrophils Absolute 1.6 (L) 1.7 - 7.7 K/uL    Lymphocytes Absolute 1.8 1.0 - 5.1 K/uL    Monocytes Absolute 0.4 0.0 - 1.3 K/uL    Eosinophils Absolute 0.0 0.0 - 0.6 K/uL    Basophils Absolute 0.1 0.0 - 0.2 K/uL   Basic Metabolic Panel   Result Value Ref Range    Sodium 143 136 - 145 mmol/L    Potassium 4.0 3.5 - 5.1 mmol/L    Chloride 99 99 - 110 mmol/L    CO2 25 21 - 32 mmol/L    Anion Gap 19 (H) 3 - 16    Glucose 89 70 - 99 mg/dL    BUN 10 7 - 20 mg/dL    CREATININE 1.0 0.9 - 1.3 mg/dL    GFR Non-African American >60 >60    GFR African American >60 >60    Calcium 9.0 8.3 - 10.6 mg/dL   Phosphorus   Result Value Ref Range    Phosphorus 3.3 2.5 - 4.9 mg/dL   Magnesium   Result Value Ref Range    Magnesium 1.80 1.80 - 2.40 mg/dL       RECENT VITALS:  BP: 138/88, Temp: 98.2 °F (36.8 °C), Pulse: 87, Resp: 16, SpO2: 99 %     ED Course     Nursing Notes, Past Medical Hx, Past Surgical Hx, Social Hx, Allergies, and Family Hx were reviewed. The patient was given the following medications:  Orders Placed This Encounter   Medications    folic acid (FOLVITE) tablet 1 mg    folic acid (FOLVITE) 126 MCG tablet     Sig: Take 1 tablet by mouth daily     Dispense:  30 tablet     Refill:  0    ondansetron (ZOFRAN) 4 MG tablet     Sig: Take 1 tablet by mouth every 8 hours as needed for Nausea     Dispense:  30 tablet     Refill:  0       CONSULTS:  None    MEDICAL DECISION MAKING / ASSESSMENT / Jake  is a 46 y.o. male who has a previous history of alcohol abuse presenting to the emergency department with a complaint of a burning in the needles type feeling in the bilateral feet. Clinical history is most consistent with possibility of peripheral neuropathy however the relatively sudden onset nature as well as some potential for preceding trauma did lead to the decision to perform lumbar spine x-rays. The patient had no reproducible lumbar spinal tenderness or deformity. X-rays have resulted back showing no evidence of any fracture. The patient also had laboratory investigations sent to evaluate for the possibility of electrolyte abnormalities or nutritional deficiencies which could predispose to peripheral neuropathy. The patient's CBC did have evidence of a macrocytic anemia potentially consistent with vitamin deficiency secondary to his alcohol dependence.   Review of the patient's record shows he has a significant history of alcohol abuse and dependence he reports that he is attempted to quit multiple times has resources for rehab as well as AA. Reports he also has Valium tablets left over at home that he is used in the past.  He desires to have nausea medicine prescribed so that he can try to voluntarily quit himself. He was counseled multiple times that he should seek rehab resources but wished to pursue treating himself at home. Was prescribed Zofran as well as folate tablets given folate here in the emergency department. 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 Clinical Impression     1. Macrocytic anemia    2. Alcohol dependence with unspecified alcohol-induced disorder ([facility] [address]    3.  Sensory neuropathy        Disposition     PATIENT REFERRED TO:  Arlene Rojas MD  [address] [place] [address] [address] [id number] [phone]            DISCHARGE MEDICATIONS:  New Prescriptions    FOLIC ACID (FOLVITE) 436 MCG TABLET    Take 1 tablet by mouth daily    ONDANSETRON (ZOFRAN) 4 MG TABLET    Take 1 tablet by mouth every 8 hours as needed for Nausea       DISPOSITION Decision To Discharge 08/01/2021 09:55:44 PM         Desiree Fox MD  08/01/21 9690

## 2021-09-21 ENCOUNTER — HOSPITAL ENCOUNTER (EMERGENCY)
Age: 41
Discharge: LEFT BEFORE DISCHARGE | End: 2021-09-21
Payer: MEDICAID

## 2021-09-21 VITALS
OXYGEN SATURATION: 97 % | DIASTOLIC BLOOD PRESSURE: 107 MMHG | TEMPERATURE: 98.8 F | SYSTOLIC BLOOD PRESSURE: 144 MMHG | RESPIRATION RATE: 18 BRPM | HEART RATE: 86 BPM

## 2021-09-21 ASSESSMENT — PAIN DESCRIPTION - PAIN TYPE: TYPE: ACUTE PAIN

## 2021-09-21 ASSESSMENT — PAIN DESCRIPTION - LOCATION: LOCATION: BACK

## 2021-09-21 ASSESSMENT — PAIN SCALES - GENERAL: PAINLEVEL_OUTOF10: 6

## 2021-09-21 ASSESSMENT — PAIN DESCRIPTION - ORIENTATION: ORIENTATION: MID;LOWER

## 2021-09-28 ENCOUNTER — HOSPITAL ENCOUNTER (EMERGENCY)
Age: 41
Discharge: HOME | End: 2021-09-28
Attending: EMERGENCY MEDICINE
Payer: MEDICAID

## 2021-09-28 VITALS
RESPIRATION RATE: 16 BRPM | HEART RATE: 95 BPM | OXYGEN SATURATION: 97 % | TEMPERATURE: 98.1 F | SYSTOLIC BLOOD PRESSURE: 127 MMHG | DIASTOLIC BLOOD PRESSURE: 86 MMHG

## 2021-09-28 DIAGNOSIS — M79.2 NEUROPATHIC PAIN: ICD-10-CM

## 2021-09-28 DIAGNOSIS — R20.2 PARESTHESIA: ICD-10-CM

## 2021-09-28 DIAGNOSIS — M54.42 ACUTE BILATERAL LOW BACK PAIN WITH BILATERAL SCIATICA: Primary | ICD-10-CM

## 2021-09-28 DIAGNOSIS — M54.41 ACUTE BILATERAL LOW BACK PAIN WITH BILATERAL SCIATICA: Primary | ICD-10-CM

## 2021-09-28 PROCEDURE — 6370000000 HC RX 637 (ALT 250 FOR IP): Performed by: PHYSICIAN ASSISTANT

## 2021-09-28 PROCEDURE — 96372 THER/PROPH/DIAG INJ SC/IM: CPT

## 2021-09-28 PROCEDURE — 99284 EMERGENCY DEPT VISIT MOD MDM: CPT

## 2021-09-28 PROCEDURE — 6360000002 HC RX W HCPCS: Performed by: PHYSICIAN ASSISTANT

## 2021-09-28 RX ORDER — GABAPENTIN 300 MG/1
300 CAPSULE ORAL ONCE
Status: COMPLETED | OUTPATIENT
Start: 2021-09-28 | End: 2021-09-28

## 2021-09-28 RX ORDER — METHOCARBAMOL 500 MG/1
1000 TABLET, FILM COATED ORAL 3 TIMES DAILY
Qty: 60 TABLET | Refills: 0 | Status: SHIPPED | OUTPATIENT
Start: 2021-09-28 | End: 2021-10-08

## 2021-09-28 RX ORDER — GABAPENTIN 300 MG/1
300 CAPSULE ORAL 2 TIMES DAILY
Qty: 30 CAPSULE | Refills: 0 | Status: SHIPPED | OUTPATIENT
Start: 2021-09-28 | End: 2021-10-13

## 2021-09-28 RX ORDER — KETOROLAC TROMETHAMINE 30 MG/ML
15 INJECTION, SOLUTION INTRAMUSCULAR; INTRAVENOUS ONCE
Status: COMPLETED | OUTPATIENT
Start: 2021-09-28 | End: 2021-09-28

## 2021-09-28 RX ORDER — MELOXICAM 15 MG/1
15 TABLET ORAL DAILY
Qty: 30 TABLET | Refills: 0 | Status: SHIPPED | OUTPATIENT
Start: 2021-09-28

## 2021-09-28 RX ORDER — METHOCARBAMOL 500 MG/1
1000 TABLET, FILM COATED ORAL ONCE
Status: COMPLETED | OUTPATIENT
Start: 2021-09-28 | End: 2021-09-28

## 2021-09-28 RX ADMIN — KETOROLAC TROMETHAMINE 15 MG: 30 INJECTION, SOLUTION INTRAMUSCULAR at 19:52

## 2021-09-28 RX ADMIN — METHOCARBAMOL 1000 MG: 500 TABLET ORAL at 19:53

## 2021-09-28 RX ADMIN — GABAPENTIN 300 MG: 300 CAPSULE ORAL at 19:53

## 2021-09-28 ASSESSMENT — PAIN DESCRIPTION - ORIENTATION: ORIENTATION: LOWER

## 2021-09-28 ASSESSMENT — PAIN DESCRIPTION - LOCATION: LOCATION: BACK

## 2021-09-28 ASSESSMENT — PAIN SCALES - GENERAL
PAINLEVEL_OUTOF10: 3
PAINLEVEL_OUTOF10: 3

## 2021-09-28 ASSESSMENT — PAIN DESCRIPTION - PAIN TYPE: TYPE: ACUTE PAIN

## 2021-09-28 NOTE — ED PROVIDER NOTES
ED Attending Attestation Note     Date of evaluation: 9/28/2021    This patient was seen by the advance practice provider. I have seen and examined the patient, agree with the workup, evaluation, management and diagnosis. The care plan has been discussed. I have reviewed the ECG and concur with the RAJNI's interpretation. My assessment reveals a generally well-appearing gentleman, pleasantly conversational, in no acute distress. He presents to the emergency department today with complaints of tingling numbness in his bilateral feet, which has been noted on prior ED visits and is felt to be likely related to to folate deficiency peripheral neuropathy, now also with some similar paresthetic numbness in the left hand, associated with discomfort and symptoms of muscle spasm in the neck and upper back. After treatment for mechanical back pain with Toradol, Robaxin, and Neurontin, the patient's back pain is much improved, and paresthetic sensation in the left hand is also improved. The patient was asked to follow-up with his primary care provider as an outpatient. He does note a family history of multiple sclerosis, and although his symptoms at this time are not especially concerning for that disease process, if he has any progression of symptoms an outpatient MRI would be appropriate.          Guevara Bernal MD  10/03/21 9671

## 2021-09-29 NOTE — ED NOTES
Patient prepared for and ready to be discharged. Patient discharged at this time to home in care of self in no acute distress after verbalizing understanding of discharge instructions. Patient left after receiving After Visit Summary instructions.         Deangelo Francis RN  09/28/21 8582

## 2021-10-04 NOTE — ED PROVIDER NOTES
[facility] ENCOUNTER          PHYSICIAN ASSISTANT NOTE       Date of evaluation: 9/28/2021    Chief Complaint     Back Pain and Numbness (Bilateral feet are numb, as well as his left hand; family history of MS)      History of Present Illness     Anthony Cuevas is a 46 y.o. male with a past medical history as noted below who presents to the Emergency Department with a complaint of bilateral lower back pain. The patient reports that he has been experiencing bilateral lower back pain and numbness in his bilateral feet and developing numbness in his left hand over the past couple of weeks. The patient reports that the pain resonates from his lower back bilaterally with pain radiating into his buttocks. He reports that the pain does not continue all the way to his feet bilaterally, but he does experience numbness in his bilateral feet which has been present for some time. He reports recently he began to experience some numbness along the thenar eminence of the left hand. He reports no functional deficits as a result. The patient reports that he is a chronic alcoholic, and does drink several alcoholic drinks per day on a daily basis. He last had some alcoholic beverages just a couple hours before coming to the emergency department. He notes that he was seen in the emergency department approximately a month to month and a half ago for similar symptoms to this, and was diagnosed with macrocytic anemia with concern for folate deficiency, given his alcoholism. He was started on folate and was advised to follow-up with his primary care physician. He reports that he is taking the folate as prescribed, but has found no change in his symptoms. He also notes that there is a family history of MS, and he is concerned that this could potentially be the development of MS. Denies any gait abnormalities. No bowel or bladder dysfunction. No paresthesia of the saddle region.   No unexplained weight loss. Denies IV drug abuse. No lightheadedness or dizziness. No other neurological dysfunction. Denies any fevers, chills, sweats or other constitutional symptoms. Review of Systems     Review of Systems   Constitutional: Negative for chills, diaphoresis, fatigue and fever. HENT: Negative for congestion, postnasal drip, rhinorrhea, sore throat and trouble swallowing. Eyes: Negative for pain and visual disturbance. Respiratory: Negative for cough, chest tightness, shortness of breath and wheezing. Cardiovascular: Negative for chest pain, palpitations and leg swelling. Gastrointestinal: Negative for abdominal distention, abdominal pain, diarrhea, nausea and vomiting. Endocrine: Negative for polydipsia and polyuria. Genitourinary: Negative for dysuria. Musculoskeletal: Positive for back pain. Negative for myalgias, neck pain and neck stiffness. Skin: Negative for color change, pallor and rash. Neurological: Positive for numbness (Partial left hand, bilateral feet). Negative for dizziness, seizures, weakness, light-headedness and headaches. Hematological: Does not bruise/bleed easily. Psychiatric/Behavioral: Negative for confusion, hallucinations, self-injury and suicidal ideas. All other systems reviewed and are negative. Past Medical, Surgical, Family, and Social History     He has a past medical history of Alcohol abuse, Anxiety, and Hypertension. He has no past surgical history on file. His family history includes Colon Cancer in his father; Glaucoma in his father. He reports that he has been smoking e-cigarettes. He has been smoking about 0.00 packs per day. He has never used smokeless tobacco. He reports current alcohol use. He reports that he does not use drugs.     Medications     Discharge Medication List as of 9/28/2021  9:41 PM      CONTINUE these medications which have NOT CHANGED    Details   metoprolol tartrate (LOPRESSOR) 25 MG tablet TAKE 1 TABLET BY MOUTH EVERY DAY, Disp-90 tablet, R-3Normal             Allergies     He is allergic to amlodipine, banana, carvedilol, and tree nut  [macadamia nut oil]. Physical Exam     INITIAL VITALS: BP: (!) 138/93, Temp: 98.1 °F (36.7 °C), Pulse: 97, Resp: 18, SpO2: 93 %  Physical Exam  Vitals and nursing note reviewed. Constitutional:       General: He is not in acute distress. Appearance: He is well-developed. He is not diaphoretic. HENT:      Head: Normocephalic and atraumatic. Eyes:      Pupils: Pupils are equal, round, and reactive to light. Neck:      Vascular: No JVD. Cardiovascular:      Rate and Rhythm: Normal rate and regular rhythm. Pulses:           Dorsalis pedis pulses are 2+ on the right side and 2+ on the left side. Posterior tibial pulses are 2+ on the right side and 2+ on the left side. Pulmonary:      Effort: Pulmonary effort is normal. No respiratory distress. Breath sounds: Normal breath sounds. No stridor. No wheezing or rales. Chest:      Chest wall: No tenderness. Abdominal:      General: There is no distension. Palpations: Abdomen is soft. Tenderness: There is no abdominal tenderness. Musculoskeletal:         General: Normal range of motion. Cervical back: Normal range of motion and neck supple. Spasms and tenderness present. No swelling, edema, deformity, erythema, signs of trauma or bony tenderness. Normal range of motion. Thoracic back: Normal.      Lumbar back: Tenderness present. No swelling, edema, deformity, signs of trauma or bony tenderness. Normal range of motion. Negative right straight leg raise test and negative left straight leg raise test.        Back:    Feet:      Right foot:      Protective Sensation: 6 sites tested. 6 sites sensed. Skin integrity: Skin integrity normal.      Left foot:      Protective Sensation: 6 sites tested. 6 sites sensed.       Skin integrity: Skin integrity normal.   Skin:     General: Skin is warm and dry. Coloration: Skin is not pale. Findings: No erythema or rash. Neurological:      Mental Status: He is alert and oriented to person, place, and time. Coordination: Coordination normal.         Diagnostic Results     RADIOLOGY:  No orders to display       LABS:   No results found for this visit on 09/28/21. ED BEDSIDE ULTRASOUND:  N/A    RECENT VITALS:  BP: 127/86, Temp: 98.1 °F (36.7 °C), Pulse: 95, Resp: 16, SpO2: 97 %     Procedures     N/A    ED Course     Nursing Notes, Past Medical Hx,Past Surgical Hx, Social Hx, Allergies, and Family Hx were reviewed. The patient was given the following medications:  Orders Placed This Encounter   Medications    methocarbamol (ROBAXIN) tablet 1,000 mg    ketorolac (TORADOL) injection 15 mg    gabapentin (NEURONTIN) capsule 300 mg    meloxicam (MOBIC) 15 MG tablet     Sig: Take 1 tablet by mouth daily     Dispense:  30 tablet     Refill:  0    methocarbamol (ROBAXIN) 500 MG tablet     Sig: Take 2 tablets by mouth 3 times daily for 10 days     Dispense:  60 tablet     Refill:  0    gabapentin (NEURONTIN) 300 MG capsule     Sig: Take 1 capsule by mouth 2 times daily for 15 days. Intended supply: 30 days     Dispense:  30 capsule     Refill:  0       CONSULTS:  None    MEDICAL DECISION MAKING / ASSESSMENT / Gab Canada is admitted to the Emergency Department for evaluation of his chief complaint as described in the history of present illness. Complete history and physical was performed by me and my attending. Nursing notes, past medical history, surgical history, family history and social history were reviewed and addressed in the HPI. Amanda Crane is a 46 y.o. male who presents to the emergency department with a complaint of lower back pain, numbness of the left hand and of the bilateral feet. Patient reports that this has been going on for over a month.   He is experienced numbness of the bilateral feet for some time. Denies any noted injury or trauma of the past.  Reports bilateral, paraspinous pain in the lumbar region with radiation to the buttock, but no extension all the way to his toes. He does have some pins and needle sensation in the bilateral feet. He reports that recently he developed the same sensation along the thenar eminence of his left hand. During physical examination, he did note some tenderness along the ridge of the trapezius along the shoulder to the neck. Reproducible paraspinous tenderness of the bilateral lumbosacral region without midline tenderness. Hemodynamically stable and within normal limits. Gross and focal distal neurological examination demonstrates no significant normalities. The patient was sensate of the feet in multiple areas tested. He reports he was seen in an emergency department approximately a month ago, diagnosed with folate deficiency anemia and was prescribed folate which he has taken. Continues to drink alcohol. The patient has been seen and evaluated for their complaint of back pain. Patient denies recent trauma. The patient denies urinary incontinence, urinary retention, and numbness in the extremities. The patient is neurologically intact, sensation is intact in the lower extremities, dorsalis pedal pulses 2+ bilaterally, and achilles reflex intact bilaterally. I do not feel imaging is necessary at this point bases on absence of trauma, and negative midline tenderness to the spine. It is my impression that based on history and negative physical exam findings the patient is not at risk for Cauda Equina Syndrome, or other spinal injury. At this point, the patient's nontraumatic back pain is likely musculoskeletal in nature. The patient will be instructed to continue taking anti-inflammatory medication, and given a Rx for short course of muscle relaxers and pain medication to include Mobic, Robaxin and Neurontin.   The patient will be given back stretching exercises, and instructed to follow up with their PCP or community clinic for further evaluation. The patient should return to the ED if the back pain worsens, or if they experience incontinence, numbness or tingling in the legs, or inability to ambulate. The patient is in agreement with this plan. Does report a family history of MS, for which she has concerns. Advised the patient that this would be best followed through his primary care physician who could order an MRI for evaluation. However he was provided for strict return precautions for the development of further worsening or debilitating neurological deficits. I discussed this plan at length the patient who verbalizes understanding and is in agreement. The patient is currently stable and will be discharged home for continued self-care. Please see patient's AVS for additional discharge instructions. The patient was seen and evaluated by myself and the attending physician, Arabella Arredondo MD, who agrees with my assessment, treatment and plan. Clinical Impression     1. Acute bilateral low back pain with bilateral sciatica    2. Neuropathic pain    3.  Paresthesia        Disposition     PATIENT REFERRED TO:  [place] [id number]  [phone]    Schedule an appointment as soon as possible for a visit in 1 week  If symptoms or not improving on the prescribed regimen    [facility] Emergency Department  [address] [facility]  Go to   If symptoms worsen      DISCHARGE MEDICATIONS:  Discharge Medication List as of 9/28/2021  9:41 PM      START taking these medications    Details   meloxicam (MOBIC) 15 MG tablet Take 1 tablet by mouth daily, Disp-30 tablet, R-0Print      methocarbamol (ROBAXIN) 500 MG tablet Take 2 tablets by mouth 3 times daily for 10 days, Disp-60 tablet, R-0Print      gabapentin (NEURONTIN) 300 MG capsule Take 1 capsule by mouth 2 times daily for 15 days.  Intended supply: 30 days, Disp-30 capsule, R-0Print             DISPOSITION Decision To Discharge 09/28/2021 09:41:47 PM       [address], [place]  10/07/21 0248

## 2021-10-07 ASSESSMENT — ENCOUNTER SYMPTOMS
COUGH: 0
SORE THROAT: 0
COLOR CHANGE: 0
DIARRHEA: 0
ABDOMINAL PAIN: 0
VOMITING: 0
BACK PAIN: 1
TROUBLE SWALLOWING: 0
NAUSEA: 0
RHINORRHEA: 0
ABDOMINAL DISTENTION: 0
EYE PAIN: 0
WHEEZING: 0
SHORTNESS OF BREATH: 0
CHEST TIGHTNESS: 0

## 2022-01-15 ENCOUNTER — APPOINTMENT (OUTPATIENT)
Dept: GENERAL RADIOLOGY | Age: 42
End: 2022-01-15
Payer: MEDICAID

## 2022-01-15 ENCOUNTER — HOSPITAL ENCOUNTER (INPATIENT)
Age: 42
LOS: 1 days | Discharge: HOME | End: 2022-01-16
Attending: EMERGENCY MEDICINE | Admitting: INTERNAL MEDICINE
Payer: MEDICAID

## 2022-01-15 DIAGNOSIS — R11.2 INTRACTABLE VOMITING WITH NAUSEA, UNSPECIFIED VOMITING TYPE: ICD-10-CM

## 2022-01-15 DIAGNOSIS — F10.10 ALCOHOL ABUSE: ICD-10-CM

## 2022-01-15 DIAGNOSIS — F10.930 ALCOHOL WITHDRAWAL SYNDROME WITHOUT COMPLICATION (HCC): Primary | ICD-10-CM

## 2022-01-15 LAB
A/G RATIO: 0.9 (ref 1.1–2.2)
ALBUMIN SERPL-MCNC: 4 G/DL (ref 3.4–5)
ALP BLD-CCNC: 272 U/L (ref 40–129)
ALT SERPL-CCNC: 32 U/L (ref 10–40)
ANION GAP SERPL CALCULATED.3IONS-SCNC: 25 MMOL/L (ref 3–16)
AST SERPL-CCNC: 100 U/L (ref 15–37)
BASOPHILS ABSOLUTE: 0.1 K/UL (ref 0–0.2)
BASOPHILS RELATIVE PERCENT: 1.1 %
BILIRUB SERPL-MCNC: 1.2 MG/DL (ref 0–1)
BUN BLDV-MCNC: 22 MG/DL (ref 7–20)
CALCIUM SERPL-MCNC: 9.1 MG/DL (ref 8.3–10.6)
CHLORIDE BLD-SCNC: 84 MMOL/L (ref 99–110)
CO2: 20 MMOL/L (ref 21–32)
CREAT SERPL-MCNC: 1.1 MG/DL (ref 0.9–1.3)
EOSINOPHILS ABSOLUTE: 0 K/UL (ref 0–0.6)
EOSINOPHILS RELATIVE PERCENT: 0 %
GFR AFRICAN AMERICAN: >60
GFR NON-AFRICAN AMERICAN: >60
GLUCOSE BLD-MCNC: 128 MG/DL (ref 70–99)
HCT VFR BLD CALC: 29.6 % (ref 40.5–52.5)
HEMOGLOBIN: 10 G/DL (ref 13.5–17.5)
LIPASE: 136 U/L (ref 13–60)
LYMPHOCYTES ABSOLUTE: 0.7 K/UL (ref 1–5.1)
LYMPHOCYTES RELATIVE PERCENT: 10.9 %
MCH RBC QN AUTO: 34.3 PG (ref 26–34)
MCHC RBC AUTO-ENTMCNC: 33.8 G/DL (ref 31–36)
MCV RBC AUTO: 101.6 FL (ref 80–100)
MONOCYTES ABSOLUTE: 0.5 K/UL (ref 0–1.3)
MONOCYTES RELATIVE PERCENT: 8.2 %
NEUTROPHILS ABSOLUTE: 5.1 K/UL (ref 1.7–7.7)
NEUTROPHILS RELATIVE PERCENT: 79.8 %
PDW BLD-RTO: 15.7 % (ref 12.4–15.4)
PLATELET # BLD: 227 K/UL (ref 135–450)
PMV BLD AUTO: 6.9 FL (ref 5–10.5)
POTASSIUM REFLEX MAGNESIUM: 4.7 MMOL/L (ref 3.5–5.1)
RBC # BLD: 2.91 M/UL (ref 4.2–5.9)
SODIUM BLD-SCNC: 129 MMOL/L (ref 136–145)
TOTAL PROTEIN: 8.6 G/DL (ref 6.4–8.2)
WBC # BLD: 6.4 K/UL (ref 4–11)

## 2022-01-15 PROCEDURE — 6370000000 HC RX 637 (ALT 250 FOR IP): Performed by: STUDENT IN AN ORGANIZED HEALTH CARE EDUCATION/TRAINING PROGRAM

## 2022-01-15 PROCEDURE — 99284 EMERGENCY DEPT VISIT MOD MDM: CPT

## 2022-01-15 PROCEDURE — U0003 INFECTIOUS AGENT DETECTION BY NUCLEIC ACID (DNA OR RNA); SEVERE ACUTE RESPIRATORY SYNDROME CORONAVIRUS 2 (SARS-COV-2) (CORONAVIRUS DISEASE [COVID-19]), AMPLIFIED PROBE TECHNIQUE, MAKING USE OF HIGH THROUGHPUT TECHNOLOGIES AS DESCRIBED BY CMS-2020-01-R: HCPCS

## 2022-01-15 PROCEDURE — 82607 VITAMIN B-12: CPT

## 2022-01-15 PROCEDURE — 85025 COMPLETE CBC W/AUTO DIFF WBC: CPT

## 2022-01-15 PROCEDURE — U0005 INFEC AGEN DETEC AMPLI PROBE: HCPCS

## 2022-01-15 PROCEDURE — 83690 ASSAY OF LIPASE: CPT

## 2022-01-15 PROCEDURE — 96361 HYDRATE IV INFUSION ADD-ON: CPT

## 2022-01-15 PROCEDURE — 82746 ASSAY OF FOLIC ACID SERUM: CPT

## 2022-01-15 PROCEDURE — 6360000002 HC RX W HCPCS: Performed by: EMERGENCY MEDICINE

## 2022-01-15 PROCEDURE — 2580000003 HC RX 258: Performed by: INTERNAL MEDICINE

## 2022-01-15 PROCEDURE — 96366 THER/PROPH/DIAG IV INF ADDON: CPT

## 2022-01-15 PROCEDURE — 2580000003 HC RX 258: Performed by: EMERGENCY MEDICINE

## 2022-01-15 PROCEDURE — 6360000002 HC RX W HCPCS: Performed by: STUDENT IN AN ORGANIZED HEALTH CARE EDUCATION/TRAINING PROGRAM

## 2022-01-15 PROCEDURE — 82077 ASSAY SPEC XCP UR&BREATH IA: CPT

## 2022-01-15 PROCEDURE — 83605 ASSAY OF LACTIC ACID: CPT

## 2022-01-15 PROCEDURE — 1200000000 HC SEMI PRIVATE

## 2022-01-15 PROCEDURE — 36415 COLL VENOUS BLD VENIPUNCTURE: CPT

## 2022-01-15 PROCEDURE — 2580000003 HC RX 258: Performed by: STUDENT IN AN ORGANIZED HEALTH CARE EDUCATION/TRAINING PROGRAM

## 2022-01-15 PROCEDURE — 96365 THER/PROPH/DIAG IV INF INIT: CPT

## 2022-01-15 PROCEDURE — 6360000002 HC RX W HCPCS: Performed by: INTERNAL MEDICINE

## 2022-01-15 PROCEDURE — 80053 COMPREHEN METABOLIC PANEL: CPT

## 2022-01-15 PROCEDURE — 71045 X-RAY EXAM CHEST 1 VIEW: CPT

## 2022-01-15 PROCEDURE — 6370000000 HC RX 637 (ALT 250 FOR IP): Performed by: EMERGENCY MEDICINE

## 2022-01-15 PROCEDURE — C9113 INJ PANTOPRAZOLE SODIUM, VIA: HCPCS | Performed by: INTERNAL MEDICINE

## 2022-01-15 PROCEDURE — 96375 TX/PRO/DX INJ NEW DRUG ADDON: CPT

## 2022-01-15 RX ORDER — LORAZEPAM 2 MG/ML
1 INJECTION INTRAMUSCULAR
Status: DISCONTINUED | OUTPATIENT
Start: 2022-01-15 | End: 2022-01-16 | Stop reason: HOSPADM

## 2022-01-15 RX ORDER — PROMETHAZINE HYDROCHLORIDE 25 MG/ML
12.5 INJECTION, SOLUTION INTRAMUSCULAR; INTRAVENOUS EVERY 6 HOURS PRN
Status: DISCONTINUED | OUTPATIENT
Start: 2022-01-15 | End: 2022-01-16 | Stop reason: HOSPADM

## 2022-01-15 RX ORDER — DEXTROSE, SODIUM CHLORIDE, SODIUM LACTATE, POTASSIUM CHLORIDE, AND CALCIUM CHLORIDE 5; .6; .31; .03; .02 G/100ML; G/100ML; G/100ML; G/100ML; G/100ML
1000 INJECTION, SOLUTION INTRAVENOUS CONTINUOUS
Status: DISCONTINUED | OUTPATIENT
Start: 2022-01-15 | End: 2022-01-15

## 2022-01-15 RX ORDER — CHLORDIAZEPOXIDE HYDROCHLORIDE 25 MG/1
50 CAPSULE, GELATIN COATED ORAL 3 TIMES DAILY
Status: DISCONTINUED | OUTPATIENT
Start: 2022-01-16 | End: 2022-01-16 | Stop reason: HOSPADM

## 2022-01-15 RX ORDER — DIAZEPAM 5 MG/1
TABLET ORAL
Qty: 38 TABLET | Refills: 0 | Status: SHIPPED | OUTPATIENT
Start: 2022-01-15 | End: 2022-01-16 | Stop reason: SDUPTHER

## 2022-01-15 RX ORDER — LORAZEPAM 1 MG/1
3 TABLET ORAL
Status: DISCONTINUED | OUTPATIENT
Start: 2022-01-15 | End: 2022-01-16 | Stop reason: HOSPADM

## 2022-01-15 RX ORDER — LORAZEPAM 2 MG/ML
2 INJECTION INTRAMUSCULAR
Status: DISCONTINUED | OUTPATIENT
Start: 2022-01-15 | End: 2022-01-16 | Stop reason: HOSPADM

## 2022-01-15 RX ORDER — PANTOPRAZOLE SODIUM 40 MG/1
40 TABLET, DELAYED RELEASE ORAL
Status: DISCONTINUED | OUTPATIENT
Start: 2022-01-16 | End: 2022-01-16 | Stop reason: HOSPADM

## 2022-01-15 RX ORDER — LORAZEPAM 2 MG/ML
3 INJECTION INTRAMUSCULAR
Status: DISCONTINUED | OUTPATIENT
Start: 2022-01-15 | End: 2022-01-16 | Stop reason: HOSPADM

## 2022-01-15 RX ORDER — DEXTROSE AND SODIUM CHLORIDE 5; .9 G/100ML; G/100ML
INJECTION, SOLUTION INTRAVENOUS CONTINUOUS
Status: DISCONTINUED | OUTPATIENT
Start: 2022-01-15 | End: 2022-01-16 | Stop reason: HOSPADM

## 2022-01-15 RX ORDER — ACETAMINOPHEN 325 MG/1
650 TABLET ORAL EVERY 6 HOURS PRN
Status: DISCONTINUED | OUTPATIENT
Start: 2022-01-15 | End: 2022-01-16 | Stop reason: HOSPADM

## 2022-01-15 RX ORDER — MULTIVITAMIN WITH IRON
1 TABLET ORAL DAILY
Status: DISCONTINUED | OUTPATIENT
Start: 2022-01-15 | End: 2022-01-16 | Stop reason: HOSPADM

## 2022-01-15 RX ORDER — SODIUM CHLORIDE 0.9 % (FLUSH) 0.9 %
5-40 SYRINGE (ML) INJECTION EVERY 12 HOURS SCHEDULED
Status: DISCONTINUED | OUTPATIENT
Start: 2022-01-15 | End: 2022-01-16 | Stop reason: HOSPADM

## 2022-01-15 RX ORDER — ONDANSETRON 2 MG/ML
4 INJECTION INTRAMUSCULAR; INTRAVENOUS EVERY 6 HOURS PRN
Status: DISCONTINUED | OUTPATIENT
Start: 2022-01-15 | End: 2022-01-16 | Stop reason: HOSPADM

## 2022-01-15 RX ORDER — LORAZEPAM 2 MG/ML
4 INJECTION INTRAMUSCULAR
Status: DISCONTINUED | OUTPATIENT
Start: 2022-01-15 | End: 2022-01-16 | Stop reason: HOSPADM

## 2022-01-15 RX ORDER — ONDANSETRON 4 MG/1
4-8 TABLET, ORALLY DISINTEGRATING ORAL EVERY 8 HOURS PRN
Qty: 20 TABLET | Refills: 0 | Status: SHIPPED | OUTPATIENT
Start: 2022-01-15

## 2022-01-15 RX ORDER — PANTOPRAZOLE SODIUM 40 MG/10ML
40 INJECTION, POWDER, LYOPHILIZED, FOR SOLUTION INTRAVENOUS DAILY
Status: DISCONTINUED | OUTPATIENT
Start: 2022-01-15 | End: 2022-01-16 | Stop reason: HOSPADM

## 2022-01-15 RX ORDER — DIAZEPAM 10 MG/1
10 TABLET ORAL ONCE
Status: COMPLETED | OUTPATIENT
Start: 2022-01-15 | End: 2022-01-15

## 2022-01-15 RX ORDER — ONDANSETRON 4 MG/1
4 TABLET, ORALLY DISINTEGRATING ORAL EVERY 8 HOURS PRN
Status: DISCONTINUED | OUTPATIENT
Start: 2022-01-15 | End: 2022-01-16 | Stop reason: HOSPADM

## 2022-01-15 RX ORDER — PROMETHAZINE HYDROCHLORIDE 25 MG/ML
12.5 INJECTION, SOLUTION INTRAMUSCULAR; INTRAVENOUS ONCE
Status: COMPLETED | OUTPATIENT
Start: 2022-01-15 | End: 2022-01-15

## 2022-01-15 RX ORDER — M-VIT,TX,IRON,MINS/CALC/FOLIC 27MG-0.4MG
1 TABLET ORAL ONCE
Status: COMPLETED | OUTPATIENT
Start: 2022-01-15 | End: 2022-01-15

## 2022-01-15 RX ORDER — LORAZEPAM 2 MG/ML
2 INJECTION INTRAMUSCULAR ONCE
Status: COMPLETED | OUTPATIENT
Start: 2022-01-15 | End: 2022-01-15

## 2022-01-15 RX ORDER — ONDANSETRON 2 MG/ML
8 INJECTION INTRAMUSCULAR; INTRAVENOUS ONCE
Status: COMPLETED | OUTPATIENT
Start: 2022-01-15 | End: 2022-01-15

## 2022-01-15 RX ORDER — LORAZEPAM 1 MG/1
2 TABLET ORAL
Status: DISCONTINUED | OUTPATIENT
Start: 2022-01-15 | End: 2022-01-16 | Stop reason: HOSPADM

## 2022-01-15 RX ORDER — ACETAMINOPHEN 650 MG/1
650 SUPPOSITORY RECTAL EVERY 6 HOURS PRN
Status: DISCONTINUED | OUTPATIENT
Start: 2022-01-15 | End: 2022-01-16 | Stop reason: HOSPADM

## 2022-01-15 RX ORDER — LORAZEPAM 1 MG/1
4 TABLET ORAL
Status: DISCONTINUED | OUTPATIENT
Start: 2022-01-15 | End: 2022-01-16 | Stop reason: HOSPADM

## 2022-01-15 RX ORDER — SODIUM CHLORIDE 9 MG/ML
INJECTION, SOLUTION INTRAVENOUS CONTINUOUS
Status: CANCELLED | OUTPATIENT
Start: 2022-01-15

## 2022-01-15 RX ORDER — LORAZEPAM 1 MG/1
1 TABLET ORAL
Status: DISCONTINUED | OUTPATIENT
Start: 2022-01-15 | End: 2022-01-16 | Stop reason: HOSPADM

## 2022-01-15 RX ORDER — SODIUM CHLORIDE 0.9 % (FLUSH) 0.9 %
5-40 SYRINGE (ML) INJECTION PRN
Status: DISCONTINUED | OUTPATIENT
Start: 2022-01-15 | End: 2022-01-16 | Stop reason: HOSPADM

## 2022-01-15 RX ORDER — POLYETHYLENE GLYCOL 3350 17 G/17G
17 POWDER, FOR SOLUTION ORAL DAILY PRN
Status: DISCONTINUED | OUTPATIENT
Start: 2022-01-15 | End: 2022-01-16 | Stop reason: HOSPADM

## 2022-01-15 RX ORDER — FOLIC ACID 1 MG/1
1 TABLET ORAL DAILY
Status: DISCONTINUED | OUTPATIENT
Start: 2022-01-15 | End: 2022-01-16 | Stop reason: HOSPADM

## 2022-01-15 RX ORDER — SODIUM CHLORIDE 9 MG/ML
25 INJECTION, SOLUTION INTRAVENOUS PRN
Status: DISCONTINUED | OUTPATIENT
Start: 2022-01-15 | End: 2022-01-16 | Stop reason: HOSPADM

## 2022-01-15 RX ORDER — SODIUM CHLORIDE, SODIUM LACTATE, POTASSIUM CHLORIDE, AND CALCIUM CHLORIDE .6; .31; .03; .02 G/100ML; G/100ML; G/100ML; G/100ML
1000 INJECTION, SOLUTION INTRAVENOUS ONCE
Status: COMPLETED | OUTPATIENT
Start: 2022-01-15 | End: 2022-01-15

## 2022-01-15 RX ORDER — GAUZE BANDAGE 2" X 2"
100 BANDAGE TOPICAL DAILY
Status: DISCONTINUED | OUTPATIENT
Start: 2022-01-15 | End: 2022-01-16 | Stop reason: HOSPADM

## 2022-01-15 RX ADMIN — PROMETHAZINE HYDROCHLORIDE 12.5 MG: 25 INJECTION INTRAMUSCULAR; INTRAVENOUS at 15:15

## 2022-01-15 RX ADMIN — THIAMINE HYDROCHLORIDE: 100 INJECTION, SOLUTION INTRAMUSCULAR; INTRAVENOUS at 21:44

## 2022-01-15 RX ADMIN — METOPROLOL TARTRATE 25 MG: 25 TABLET, FILM COATED ORAL at 21:42

## 2022-01-15 RX ADMIN — SODIUM CHLORIDE, PRESERVATIVE FREE 10 ML: 5 INJECTION INTRAVENOUS at 22:56

## 2022-01-15 RX ADMIN — ONDANSETRON 8 MG: 2 INJECTION INTRAMUSCULAR; INTRAVENOUS at 11:55

## 2022-01-15 RX ADMIN — MULTIPLE VITAMINS W/ MINERALS TAB 1 TABLET: TAB at 14:17

## 2022-01-15 RX ADMIN — DEXTROSE AND SODIUM CHLORIDE: 5; 900 INJECTION, SOLUTION INTRAVENOUS at 20:46

## 2022-01-15 RX ADMIN — SODIUM CHLORIDE, SODIUM LACTATE, POTASSIUM CHLORIDE, CALCIUM CHLORIDE AND DEXTROSE MONOHYDRATE 1000 ML: 5; 600; 310; 30; 20 INJECTION, SOLUTION INTRAVENOUS at 14:17

## 2022-01-15 RX ADMIN — PANTOPRAZOLE SODIUM 40 MG: 40 INJECTION, POWDER, FOR SOLUTION INTRAVENOUS at 22:00

## 2022-01-15 RX ADMIN — FOLIC ACID 1 MG: 1 TABLET ORAL at 14:17

## 2022-01-15 RX ADMIN — DIAZEPAM 10 MG: 10 TABLET ORAL at 11:55

## 2022-01-15 RX ADMIN — Medication 100 MG: at 14:17

## 2022-01-15 RX ADMIN — ENOXAPARIN SODIUM 40 MG: 100 INJECTION SUBCUTANEOUS at 21:42

## 2022-01-15 RX ADMIN — LORAZEPAM 2 MG: 2 INJECTION INTRAMUSCULAR; INTRAVENOUS at 17:57

## 2022-01-15 RX ADMIN — LORAZEPAM 1 MG: 2 INJECTION INTRAMUSCULAR; INTRAVENOUS at 21:41

## 2022-01-15 RX ADMIN — SODIUM CHLORIDE, POTASSIUM CHLORIDE, SODIUM LACTATE AND CALCIUM CHLORIDE 1000 ML: 600; 310; 30; 20 INJECTION, SOLUTION INTRAVENOUS at 11:56

## 2022-01-15 RX ADMIN — THERA TABS 1 TABLET: TAB at 21:41

## 2022-01-15 NOTE — H&P
Internal Medicine  PGY-2  History & Physical      CC: Nausea/vomiting    HistoryObtained From:  patient    HISTORY OF PRESENT ILLNESS:  Aleksey Greenwood is a 46 y.o. male with medical history of hypertension, anxiety and alcohol abuse who presents with nausea and vomiting. Patient states he usually drinks about a fifth of vodka a day for the past 3 months. He states he was trying to sober up the past 3-4 days at home by abstaining from alcohol. He endorses worsening nausea and vomiting (NBNB) over the last day. He states he drank a small amount of alcohol yesterday to try to help with him symptoms but has not helped. Per his record, he has had roughly 7-8 admission for alcohol withdrawal within the last 2 years. He denies any chest pain, hallucinations, tremors, anxiety or seizures. Last drink 3:30 AM on 1/15/22. He also endorses a cough productive of whitish sputum x2 weeks. He denies fevers, chills, diarrhea, or known sick contacts. He denies ever testing positive for COVID in the past. He has notably not been vaccinated for COVID. ED course:   Vitals: Hypertensive (151/96), tachycardic (105), tachypneic (20)  Exam: Cardiac, pulmonary, abdominal, MSK exam normal.  Mild tremulousness with extension of arms, mild tongue fasiculations. Labs: Hyponatremic (129), AG (25), Alk phos (272), AST (100), Lipase (136), Bilirubin (1.2)  Imaging: None  Intervention: Promethazine, Zofran, Lorazepam 2 mg x1, Valium 10 mg x1, LR bolus x1    Past Medical History:        Diagnosis Date    Alcohol abuse     Anxiety     Hypertension        Past Surgical History:    History reviewed. No pertinent surgical history. Medications Prior to Admission:    Not in a hospital admission. ·   Allergies:  Amlodipine, Banana, Carvedilol, and Tree nut  [macadamia nut oil]    Social History:   · TOBACCO: reports that he has been smoking e-cigarettes. He has been smoking about 0.00 packs per day.  He has never used smokeless tobacco.  · ETOH:   reports current alcohol use. · DRUGS : Alcohol  · Patient currently lives with family (parents)    Family History:       Problem Relation Age of Onset    Glaucoma Father     Colon Cancer Father        Review of Systems   All other systems reviewed and are negative.  : A 10 point review of systems was conducted, significant findings as noted in HPI. Physical Exam  Constitutional:       Appearance: Normal appearance. He is normal weight. HENT:      Head: Normocephalic and atraumatic. Mouth/Throat:      Mouth: Mucous membranes are moist.      Pharynx: Oropharynx is clear. Eyes:      Conjunctiva/sclera: Conjunctivae normal.      Pupils: Pupils are equal, round, and reactive to light. Cardiovascular:      Rate and Rhythm: Regular rhythm. Tachycardia present. Pulses: Normal pulses. Heart sounds: Normal heart sounds. Pulmonary:      Effort: Pulmonary effort is normal.      Breath sounds: Normal breath sounds. Abdominal:      General: Abdomen is flat. Bowel sounds are normal.      Palpations: Abdomen is soft. Musculoskeletal:         General: Normal range of motion. Cervical back: Normal range of motion and neck supple. Skin:     General: Skin is warm and dry. Capillary Refill: Capillary refill takes less than 2 seconds. Neurological:      Mental Status: He is alert. Cranial Nerves: Cranial nerves are intact. Sensory: Sensation is intact. Motor: Tremor present. Coordination: Coordination is intact. Gait: Gait is intact.              Vitals:    01/15/22 1800   BP: (!) 156/96   Pulse: 105   Resp: 15   Temp:    SpO2: 93%       DATA:    Labs:  BMP:   Recent Labs     01/15/22  1139   *   K 4.7   CL 84*   CO2 20*   BUN 22*   CREATININE 1.1   GLUCOSE 128*     CBC:   Recent Labs     01/15/22  1139   WBC 6.4   HGB 10.0*   HCT 29.6*          LFT's:   Recent Labs     01/15/22  1139   *   ALT 32   BILITOT 1.2*   ALKPHOS 272* Troponin: No results for input(s): TROPONINI in the last 72 hours. BNP: No results for input(s): BNP in the last 72 hours. ABGs: No results for input(s): PHART, [id number], PO2ART in the last 72 hours. INR: No results for input(s): INR in the last 72 hours. U/A:No results for input(s): NITRITE, COLORU, PHUR, LABCAST, WBCUA, RBCUA, MUCUS, TRICHOMONAS, YEAST, BACTERIA, CLARITYU, SPECGRAV, LEUKOCYTESUR, UROBILINOGEN, BILIRUBINUR, BLOODU, GLUCOSEU, AMORPHOUS in the last 72 hours. Invalid input(s): KETONESU    XR CHEST PORTABLE   Final Result      No acute disease          ASSESSMENT AND PLAN:  Yobany Guerra is a 46 y.o. male who presents with alcohol withdrawal.    Alcohol Withdrawal  Patient has presented numerous times for alcohol withdrawal. Tried to stop drinking at home over the last 4-5 days.  - Alcohol Withdrawal   - CIWA protocol with ativan  - Daily thiamine, folate & multivitamins  - Seizure precautions  - D5NS  - social work consulted  - librium 50 TID  - bowel rest  - phenergan, zofran  - ethanol level, UDS  - social work consulted    Hyponatremia  Likely 2/2 decreased PO intake and malnutrition  - continue to monitor  - on D5NS  - Na q6 hours    Concern for COVID-19  Patient complains of productive cough x2 weeks. Unvaccinated. - f/u COVID test  - f/u CXR    Essential Hypertension  Should improve with alleviation of withdrawal sx  - Continue home Lopressor 25 mg   - Treat withdrawal as per above    Macrocytic Anemia  - folate, B12 pending  - multivitamin, P94, folic acid ordered    Elevated lipase  Lipase elevated, but does not meet criteria or clinical picture for pancreatitis.   - CT if lipase continues to rise or if patient has abdominal pain    Code Status: Full  FEN: IVF: NS; Adult diet  PPX: Protonix, Lovenox  DISPO: GMF      This patient will be discussed with attending, Candance Abu, MD.    Russell Boothe MD, PGY-2  1/15/2022,  6:29 PM     I certify that Yobany Guerra is expected to be hospitalized for 2 midnights based on the following assessment and plan:      Patient seen and examined, plan of care discussed with residents. Agree with their assessment and plan with following addendum:  29-year-old male with medical history of alcohol abuse and previous admissions for withdrawal presents with intractable nausea vomiting. Denies any abdominal pain or diarrhea. Patient states gastrointestinal symptoms are common when he has withdrawal and he did try to stop alcohol several days ago. In the ER patient was given IV fluids and antiemetics, however continued to have emesis and started to have worsening signs and symptoms of alcohol withdrawal.  Will admit for IV hydration, monitor electrolytes pretty closely, check lactic acid, IV thiamine prior to dextrose containing fluids. Placed on liquid diet for now PPI. If continues to have gastrointestinal symptoms he may need CT to evaluate for pancreatitis, however at this point he is not having abdominal pain. Librium, CIWA protocol for withdrawal.  Rule out COVID-19 as reported some cough and also having gastrointestinal symptoms.        Kashif Benavidez MD

## 2022-01-15 NOTE — ED PROVIDER NOTES
[address] [facility] ENCOUNTER          ATTENDING PHYSICIAN NOTE       Date of evaluation: 1/15/2022    ADDENDUM:      Care of this patient was assumed from Dr. Alejandrina Robledo. The patient was seen for Alcohol Problem (\"alcohol withdrawal, had some last night around 0330. havent been able to keep much of anything down for 3-4 days, bc i was trying to detox at home\" was drinking 1/5 of vodka a day x3 months)  . The patient's initial evaluation and plan have been discussed with the prior provider who initially evaluated the patient. Nursing Notes, Past Medical Hx, Past Surgical Hx, Social Hx, Allergies, and Family Hx were all reviewed.     Diagnostic Results       RADIOLOGY:  No orders to display       LABS:   Results for orders placed or performed during the hospital encounter of 01/15/22   Comprehensive Metabolic Panel w/ Reflex to MG   Result Value Ref Range    Sodium 129 (L) 136 - 145 mmol/L    Potassium reflex Magnesium 4.7 3.5 - 5.1 mmol/L    Chloride 84 (L) 99 - 110 mmol/L    CO2 20 (L) 21 - 32 mmol/L    Anion Gap 25 (H) 3 - 16    Glucose 128 (H) 70 - 99 mg/dL    BUN 22 (H) 7 - 20 mg/dL    CREATININE 1.1 0.9 - 1.3 mg/dL    GFR Non-African American >60 >60    GFR African American >60 >60    Calcium 9.1 8.3 - 10.6 mg/dL    Total Protein 8.6 (H) 6.4 - 8.2 g/dL    Albumin 4.0 3.4 - 5.0 g/dL    Albumin/Globulin Ratio 0.9 (L) 1.1 - 2.2    Total Bilirubin 1.2 (H) 0.0 - 1.0 mg/dL    Alkaline Phosphatase 272 (H) 40 - 129 U/L    ALT 32 10 - 40 U/L     (H) 15 - 37 U/L   CBC Auto Differential   Result Value Ref Range    WBC 6.4 4.0 - 11.0 K/uL    RBC 2.91 (L) 4.20 - 5.90 M/uL    Hemoglobin 10.0 (L) 13.5 - 17.5 g/dL    Hematocrit 29.6 (L) 40.5 - 52.5 %    .6 (H) 80.0 - 100.0 fL    MCH 34.3 (H) 26.0 - 34.0 pg    MCHC 33.8 31.0 - 36.0 g/dL    RDW 15.7 (H) 12.4 - 15.4 %    Platelets 461 541 - 944 K/uL    MPV 6.9 5.0 - 10.5 fL    Neutrophils % 79.8 %    Lymphocytes % 10.9 %    Monocytes % 8.2 % Eosinophils % 0.0 %    Basophils % 1.1 %    Neutrophils Absolute 5.1 1.7 - 7.7 K/uL    Lymphocytes Absolute 0.7 (L) 1.0 - 5.1 K/uL    Monocytes Absolute 0.5 0.0 - 1.3 K/uL    Eosinophils Absolute 0.0 0.0 - 0.6 K/uL    Basophils Absolute 0.1 0.0 - 0.2 K/uL   Lipase   Result Value Ref Range    Lipase 136.0 (H) 13.0 - 60.0 U/L       RECENT VITALS:  BP: (!) 151/96, Temp: 98 °F (36.7 °C), Pulse: 105, Resp: 20, SpO2: 100 %     Procedures       ED Course     The patient was given the following medications:  Orders Placed This Encounter   Medications    lactated ringers bolus    diazePAM (VALIUM) tablet 10 mg    ondansetron (ZOFRAN) injection 8 mg    dextrose 5 % in lactated ringers infusion    thiamine mononitrate tablet 100 mg    therapeutic multivitamin-minerals 1 tablet    folic acid (FOLVITE) tablet 1 mg    ondansetron (ZOFRAN ODT) 4 MG disintegrating tablet     Sig: Place 1-2 tablets under the tongue every 8 hours as needed for Nausea or Vomiting     Dispense:  20 tablet     Refill:  0    diazePAM (VALIUM) 5 MG tablet     Sig: Take 2 tablets by mouth every 6 hours as needed (withdrawal) for 2 days, THEN 2 tablets every 8 hours as needed (withdrawal) for 2 days, THEN 1 tablet every 8 hours as needed (withdrawal) for 2 days, THEN 1 tablet every 12 hours as needed (withdrawal) for up to 2 days. Dispense:  38 tablet     Refill:  0    promethazine (PHENERGAN) injection 12.5 mg    LORazepam (ATIVAN) injection 2 mg       CONSULTS:  IP CONSULT TO HOSPITALIST    [address] [facility] / DAVID / Marc Avni is a 46 y.o. male with a history of alcohol dependence who presented with symptoms of alcohol withdrawal, with prominent nausea and vomiting and inability to tolerate p.o. food or fluids. Please see Dr. Mayra Wolfe initial dictation for details of the patient's history, physical examination, and initial emergency department course.   The patient's care was given over to me at 1630 hrs with results of a second attempt at emesis control and p.o. challenge pending, as the patient had preferred to try to manage his symptoms as an outpatient. The plan at that point was to discharge patient with nausea control and a Valium taper, should his symptoms of nausea and vomiting be alleviated in the emergency department. At the time of signout, the patient had received a dose of promethazine about 1 hour prior, having been unable to tolerate p.o. after Zofran and fluids. After the promethazine, the patient was briefly able to tolerate some juice and crackers, but then had resurgence of nausea, and vomited this up. At that point, the patient was tachycardic in the 1 teens, hypertensive with systolic in the 820L, and I calculated a CIWA score of 20 based on the patient's current symptoms. The patient was at this point ordered 2 mg of lorazepam IV, for worsening symptoms of alcohol withdrawal.  The patient has had a progressive increase in his tachycardia and hypertension over the course of his emergency department stay, with intractable nausea and vomiting that has not been able to be controlled with 2 different antiemetics in the emergency department. As a result, I did recommend admission for IV fluid administration, antiemetics, and benzodiazepine administration for progressive alcohol withdrawal symptoms. The patient's, having been unable to tolerate p.o. after treatment in the emergency department, is now amenable to admission. The hospitalist has been consulted for ongoing management of this patient's alcohol withdrawal and intractable nausea and vomiting. Clinical Impression     1. Alcohol withdrawal syndrome without complication ([address]    2.  Intractable vomiting with nausea, unspecified vomiting type        Disposition     PATIENT REFERRED TO:  [facility] Emergency Department  [address]  [address]  [place] [id number]  [phone]    If symptoms worsen    Law Shane

## 2022-01-15 NOTE — ED PROVIDER NOTES
[address] [facility]          ATTENDING PHYSICIAN NOTE       Date of evaluation: 1/15/2022    Chief Complaint     Alcohol Problem (\"alcohol withdrawal, had some last night around 0330. havent been able to keep much of anything down for 3-4 days, bc i was trying to detox at home\" was drinking 1/5 of vodka a day x3 months)      History of Present Illness     Horacio Heredia is a 46 y.o. male with history of alcohol abuse presenting to the emergency department today for alcohol withdrawal and desire for sobriety. Patient states he has been attempting to get sober at home for approximately 4 to 5 days. He has been having worsening nausea and nonbloody emesis over the last day. He drank a small amount of alcohol last night to try to help with his symptoms but continues to feel as though he is going into worsening withdrawal.  He has required both outpatient and inpatient management of withdrawal symptoms deviously. Does not note any ICU admissions or intubations required for withdrawal symptoms. Denies any hallucinations, feels mildly shaky and anxious. Was typically drinking approximately one fifth of liquor daily prior to discontinuation    Review of Systems     Pertinent positive and negative findings as documented in the HPI. Otherwise all other systems were reviewed and were negative. Physical Exam     INITIAL VITALS: BP: (!) 136/99, Temp: 98 °F (36.7 °C), Pulse: 92, Resp: 16, SpO2: 100 %     Nursing note and vitals reviewed. General:  Adult male, alert and appropriately interactive. In no distress. HENT: Normocephalic and atraumatic. External ears normal. Nose appears normal externally. Eyes: Conjunctivae normal. No scleral icterus. PERRL, no nystagmus. Neck: Neck supple. No tracheal deviation present. CV: Normal rate. Regular rhythm. S1/S2 auscultated. No murmurs, gallops or rubs. Pulm: Effort normal. Breath sounds clear to auscultation bilaterally.  No wheezes. No rales or rhonchi. GI: Soft. No distension. No tenderness. No rebound or guarding. No masses. No peritoneal signs. Musculoskeletal: No edema. No gross deformities. Neurological: Alert and appropriately interactive. Face symmetric, speech without dysarthria or obvious aphasia. Moving all extremities spontaneously. Mild tremulousness with arms extended, mild tongue fasciculations. Skin: Warm, dry. No rash. No diaphoresis or erythema. Psychiatric: Calm and cooperative with appropriate mood and affect. No evidence of auditory visual hallucinations. Procedures   Procedures    MEDICAL DECISION MAKING     MDM: Thalia Gomez is a 46 y.o. male with history as above presenting for alcohol withdrawal and desire to attain sobriety. On arrival, he is in no distress and vital signs are reassuring. At the time of my assessment he is mildly tachycardic and very mildly hypertensive with some slight tremulousness. Symptoms are consistent with early alcohol withdrawal and he was given oral Valium in addition to Zofran and IV fluids. Labs notable for mild hyponatremia, anion gap acidosis, likely secondary to alcoholic ketoacidosis and possibly some component of starvation ketoacidosis. He has a slightly worsened macrocytic anemia. Lipase mildly elevated, though not 3 times the upper limit of normal, and he has no exam findings consistent with acute pancreatitis. He was resting comfortably on multiple reevaluations after initial treatment. He has no evidence of significant withdrawal at this time. He was given thiamine, folate, multivitamin, and D5 LR. He did initially have some difficulty tolerating p.o. intake in the emergency department and is pending repeat p.o. challenge after a dose of Phenergan in the ED at time of signout. I have prescribed Valium taper a Valium taper as well as Zofran. Substance abuse resources provided.   Signed out to the oncoming physician Dr. Eric Christian, we will follow-up on the results of his p.o. challenge and determine the patient's ultimate disposition. Clinical Impression     1. Alcohol abuse        Disposition     DISPOSITION          Olivier Rush MD  2:20 PM                     Past Medical, Surgical, Family, and Social History     He has a past medical history of Alcohol abuse, Anxiety, and Hypertension. He has no past surgical history on file. His family history includes Colon Cancer in his father; Glaucoma in his father. He reports that he has been smoking e-cigarettes. He has been smoking about 0.00 packs per day. He has never used smokeless tobacco. He reports current alcohol use. He reports that he does not use drugs. Medications     Previous Medications    GABAPENTIN (NEURONTIN) 300 MG CAPSULE    Take 1 capsule by mouth 2 times daily for 15 days. Intended supply: 30 days    MELOXICAM (MOBIC) 15 MG TABLET    Take 1 tablet by mouth daily    METOPROLOL TARTRATE (LOPRESSOR) 25 MG TABLET    TAKE 1 TABLET BY MOUTH EVERY DAY       Allergies     He is allergic to amlodipine, banana, carvedilol, and tree nut  [macadamia nut oil]. ED Course     Nursing Notes, Past Medical Hx, Past Surgical Hx, Social Hx,Allergies, and Family Hx were reviewed.     Patient was given the following medications:  Orders Placed This Encounter   Medications    lactated ringers bolus    diazePAM (VALIUM) tablet 10 mg    ondansetron (ZOFRAN) injection 8 mg    dextrose 5 % in lactated ringers infusion    thiamine mononitrate tablet 100 mg    therapeutic multivitamin-minerals 1 tablet    folic acid (FOLVITE) tablet 1 mg       Diagnostic Results       RECENT VITALS:  BP: (!) 141/105,Temp: 98 °F (36.7 °C), Pulse: 104, Resp: 16, SpO2: 100 %     RADIOLOGY:  No orders to display       LABS:   Results for orders placed or performed during the hospital encounter of 01/15/22   Comprehensive Metabolic Panel w/ Reflex to MG   Result Value Ref Range    Sodium 129 (L) 136 - 145 mmol/L    Potassium reflex Magnesium 4.7 3.5 - 5.1 mmol/L    Chloride 84 (L) 99 - 110 mmol/L    CO2 20 (L) 21 - 32 mmol/L    Anion Gap 25 (H) 3 - 16    Glucose 128 (H) 70 - 99 mg/dL    BUN 22 (H) 7 - 20 mg/dL    CREATININE 1.1 0.9 - 1.3 mg/dL    GFR Non-African American >60 >60    GFR African American >60 >60    Calcium 9.1 8.3 - 10.6 mg/dL    Total Protein 8.6 (H) 6.4 - 8.2 g/dL    Albumin 4.0 3.4 - 5.0 g/dL    Albumin/Globulin Ratio 0.9 (L) 1.1 - 2.2    Total Bilirubin 1.2 (H) 0.0 - 1.0 mg/dL    Alkaline Phosphatase 272 (H) 40 - 129 U/L    ALT 32 10 - 40 U/L     (H) 15 - 37 U/L   CBC Auto Differential   Result Value Ref Range    WBC 6.4 4.0 - 11.0 K/uL    RBC 2.91 (L) 4.20 - 5.90 M/uL    Hemoglobin 10.0 (L) 13.5 - 17.5 g/dL    Hematocrit 29.6 (L) 40.5 - 52.5 %    .6 (H) 80.0 - 100.0 fL    MCH 34.3 (H) 26.0 - 34.0 pg    MCHC 33.8 31.0 - 36.0 g/dL    RDW 15.7 (H) 12.4 - 15.4 %    Platelets 495 352 - 833 K/uL    MPV 6.9 5.0 - 10.5 fL    Neutrophils % 79.8 %    Lymphocytes % 10.9 %    Monocytes % 8.2 %    Eosinophils % 0.0 %    Basophils % 1.1 %    Neutrophils Absolute 5.1 1.7 - 7.7 K/uL    Lymphocytes Absolute 0.7 (L) 1.0 - 5.1 K/uL    Monocytes Absolute 0.5 0.0 - 1.3 K/uL    Eosinophils Absolute 0.0 0.0 - 0.6 K/uL    Basophils Absolute 0.1 0.0 - 0.2 K/uL   Lipase   Result Value Ref Range    Lipase 136.0 (H) 13.0 - 60.0 U/L       CONSULTS:  None    PATIENT REFERRED TO:  [facility] Emergency Department  [address] [facility]    If symptoms worsen    Law Shane MD  [facility]  [phone]    Schedule an appointment as soon as possible for a visit in 1 week  For reexamination      DISCHARGE MEDICATIONS:  New Prescriptions    No medications on file          Caterina Lester MD  01/15/22 [id number]

## 2022-01-16 VITALS
SYSTOLIC BLOOD PRESSURE: 127 MMHG | OXYGEN SATURATION: 97 % | WEIGHT: 165 LBS | BODY MASS INDEX: 24.44 KG/M2 | DIASTOLIC BLOOD PRESSURE: 94 MMHG | RESPIRATION RATE: 14 BRPM | HEART RATE: 90 BPM | HEIGHT: 69 IN | TEMPERATURE: 97.8 F

## 2022-01-16 LAB
ANION GAP SERPL CALCULATED.3IONS-SCNC: 15 MMOL/L (ref 3–16)
BUN BLDV-MCNC: 17 MG/DL (ref 7–20)
CALCIUM SERPL-MCNC: 8.6 MG/DL (ref 8.3–10.6)
CHLORIDE BLD-SCNC: 91 MMOL/L (ref 99–110)
CO2: 25 MMOL/L (ref 21–32)
CREAT SERPL-MCNC: 1.1 MG/DL (ref 0.9–1.3)
EKG ATRIAL RATE: 97 BPM
EKG DIAGNOSIS: NORMAL
EKG P AXIS: 46 DEGREES
EKG P-R INTERVAL: 178 MS
EKG Q-T INTERVAL: 356 MS
EKG QRS DURATION: 88 MS
EKG QTC CALCULATION (BAZETT): 452 MS
EKG R AXIS: 68 DEGREES
EKG T AXIS: 57 DEGREES
EKG VENTRICULAR RATE: 97 BPM
ETHANOL: NORMAL MG/DL (ref 0–0.08)
FOLATE: >20 NG/ML (ref 4.78–24.2)
GFR AFRICAN AMERICAN: >60
GFR NON-AFRICAN AMERICAN: >60
GLUCOSE BLD-MCNC: 107 MG/DL (ref 70–99)
LACTIC ACID: 1.2 MMOL/L (ref 0.4–2)
POTASSIUM REFLEX MAGNESIUM: 4.3 MMOL/L (ref 3.5–5.1)
SODIUM BLD-SCNC: 131 MMOL/L (ref 136–145)
VITAMIN B-12: 811 PG/ML (ref 211–911)

## 2022-01-16 PROCEDURE — 6360000002 HC RX W HCPCS: Performed by: STUDENT IN AN ORGANIZED HEALTH CARE EDUCATION/TRAINING PROGRAM

## 2022-01-16 PROCEDURE — C9113 INJ PANTOPRAZOLE SODIUM, VIA: HCPCS | Performed by: INTERNAL MEDICINE

## 2022-01-16 PROCEDURE — 93005 ELECTROCARDIOGRAM TRACING: CPT

## 2022-01-16 PROCEDURE — 2580000003 HC RX 258: Performed by: STUDENT IN AN ORGANIZED HEALTH CARE EDUCATION/TRAINING PROGRAM

## 2022-01-16 PROCEDURE — 6360000002 HC RX W HCPCS: Performed by: INTERNAL MEDICINE

## 2022-01-16 PROCEDURE — 6370000000 HC RX 637 (ALT 250 FOR IP): Performed by: STUDENT IN AN ORGANIZED HEALTH CARE EDUCATION/TRAINING PROGRAM

## 2022-01-16 PROCEDURE — 2580000003 HC RX 258: Performed by: INTERNAL MEDICINE

## 2022-01-16 RX ORDER — ONDANSETRON 4 MG/1
4 TABLET, FILM COATED ORAL DAILY PRN
Qty: 30 TABLET | Refills: 0 | Status: SHIPPED | OUTPATIENT
Start: 2022-01-16 | End: 2022-01-16 | Stop reason: HOSPADM

## 2022-01-16 RX ORDER — DIAZEPAM 5 MG/1
TABLET ORAL
Qty: 38 TABLET | Refills: 0 | Status: SHIPPED | OUTPATIENT
Start: 2022-01-16 | End: 2022-01-24

## 2022-01-16 RX ADMIN — THIAMINE HYDROCHLORIDE 250 MG: 100 INJECTION, SOLUTION INTRAMUSCULAR; INTRAVENOUS at 09:16

## 2022-01-16 RX ADMIN — THERA TABS 1 TABLET: TAB at 09:06

## 2022-01-16 RX ADMIN — METOPROLOL TARTRATE 25 MG: 25 TABLET, FILM COATED ORAL at 09:06

## 2022-01-16 RX ADMIN — CHLORDIAZEPOXIDE HYDROCHLORIDE 50 MG: 25 CAPSULE ORAL at 09:06

## 2022-01-16 RX ADMIN — DEXTROSE AND SODIUM CHLORIDE: 5; 900 INJECTION, SOLUTION INTRAVENOUS at 09:13

## 2022-01-16 RX ADMIN — SODIUM CHLORIDE, PRESERVATIVE FREE 10 ML: 5 INJECTION INTRAVENOUS at 09:06

## 2022-01-16 RX ADMIN — ENOXAPARIN SODIUM 40 MG: 100 INJECTION SUBCUTANEOUS at 09:07

## 2022-01-16 RX ADMIN — FOLIC ACID 1 MG: 1 TABLET ORAL at 09:06

## 2022-01-16 RX ADMIN — PANTOPRAZOLE SODIUM 40 MG: 40 INJECTION, POWDER, FOR SOLUTION INTRAVENOUS at 09:06

## 2022-01-16 ASSESSMENT — PAIN SCALES - GENERAL: PAINLEVEL_OUTOF10: 0

## 2022-01-16 NOTE — PROGRESS NOTES
Progress Note    Admit Date: 1/15/2022  Day: 2  Diet: ADULT DIET; Clear Liquid; No Added Salt (3-4 gm)    CC: Nausea and vomiting     Interval history:   Pt seen at bedside  Nausea and vomiting have improved since admission. Pt denies any SOB or abdominal pain. Denies any diarrhea, chest pain, LE swelling or any headaches. Pt endorses feeling unsteady for the past few months on ambulation     HPI:   Dmitry Richardson is a 46 y.o. male with medical history of hypertension, anxiety and alcohol abuse who presents with nausea and vomiting. Patient states he usually drinks about a fifth of vodka a day for the past 3 months. He states he was trying to sober up the past 3-4 days at home by abstaining from alcohol. He endorses worsening nausea and vomiting that was nonbloody and non bilious over the last day. He states he drank a small amount of alcohol yesterday to try to help with him symptoms but has not helped. Per his record, he has had roughly 7-8 admission for alcohol withdrawal within the last 2 years. He denies any chest pain, hallucinations, tremors, anxiety or seizures. Last drink 3:30 AM on 1/15/22.      He also endorses a cough productive of whitish sputum x2 weeks. He denies fevers, chills, diarrhea, or known sick contacts. He denies ever testing positive for COVID in the past. He has notably not been vaccinated for COVID.     ED course:   Vitals: Hypertensive (151/96), tachycardic (105), tachypneic (20)  Exam: Cardiac, pulmonary, abdominal, MSK exam normal.  Mild tremulousness with extension of arms, mild tongue fasiculations.   Labs: Hyponatremic (129), AG (25), Alk phos (272), AST (100), Lipase (136), Bilirubin (1.2)  Imaging: None  Intervention: Promethazine, Zofran, Lorazepam 2 mg x1, Valium 10 mg x1, LR bolus     Medications:     Scheduled Meds:   [Held by provider] thiamine  100 mg Oral Daily    folic acid  1 mg Oral Daily    sodium chloride flush  5-40 mL IntraVENous 2 times per day    enoxaparin  40 mg SubCUTAneous Daily    chlordiazePOXIDE  50 mg Oral TID    metoprolol tartrate  25 mg Oral Daily    [Held by provider] pantoprazole  40 mg Oral QAM AC    multivitamin  1 tablet Oral Daily    thiamine (VITAMIN B1) IVPB  250 mg IntraVENous Daily    pantoprazole  40 mg IntraVENous Daily     Continuous Infusions:   sodium chloride      dextrose 5 % and 0.9 % NaCl 100 mL/hr at 01/15/22 2046     PRN Meds:sodium chloride flush, sodium chloride, ondansetron **OR** ondansetron, polyethylene glycol, acetaminophen **OR** acetaminophen, LORazepam **OR** LORazepam **OR** LORazepam **OR** LORazepam **OR** LORazepam **OR** LORazepam **OR** LORazepam **OR** LORazepam, promethazine    Objective:   Vitals:   T-max:  Patient Vitals for the past 8 hrs:   BP Temp Temp src SpO2   01/16/22 0500 122/82 99.7 °F (37.6 °C) Oral --   01/16/22 0100 -- 100 °F (37.8 °C) Oral 97 %     No intake or output data in the 24 hours ending 01/16/22 0730    Review of Systems   All other systems reviewed and are negative. A 10 point review of systems was conducted, significant findings as noted in HPI.     Physical Exam  Constitutional:       Appearance: Normal appearance. He is normal weight. HENT:      Head: Normocephalic and atraumatic. Mouth/Throat:      Mouth: Mucous membranes are moist.      Pharynx: Oropharynx is clear. Eyes:      Conjunctiva/sclera: Conjunctivae normal.      Pupils: Pupils are equal, round, and reactive to light. Cardiovascular:      Rate and Rhythm: Regular rhythm. Tachycardia present. Pulses: Normal pulses. Heart sounds: Normal heart sounds. Pulmonary:      Effort: Pulmonary effort is normal.      Breath sounds: Normal breath sounds. Abdominal:      General: Abdomen is flat. Bowel sounds are normal.      Palpations: Abdomen is soft. Musculoskeletal:         General: Normal range of motion. Cervical back: Normal range of motion and neck supple.    Skin:     General: Skin is warm and dry. Capillary Refill: Capillary refill takes less than 2 seconds. Neurological:      Mental Status: He is alert. Cranial Nerves: Cranial nerves are intact. Sensory: Sensation is intact. Motor: Tremor present. Coordination: Coordination is intact. Gait: Gait is intact. LABS:    CBC:   Recent Labs     01/15/22  1139   WBC 6.4   HGB 10.0*   HCT 29.6*      .6*     Renal:    Recent Labs     01/15/22  1139 01/15/22  2345   * 131*   K 4.7 4.3   CL 84* 91*   CO2 20* 25   BUN 22* 17   CREATININE 1.1 1.1   GLUCOSE 128* 107*   CALCIUM 9.1 8.6   ANIONGAP 25* 15     Hepatic:   Recent Labs     01/15/22  1139   *   ALT 32   BILITOT 1.2*   PROT 8.6*   LABALBU 4.0   ALKPHOS 272*     Troponin: No results for input(s): TROPONINI in the last 72 hours. BNP: No results for input(s): BNP in the last 72 hours. Lipids: No results for input(s): CHOL, HDL in the last 72 hours. Invalid input(s): LDLCALCU, TRIGLYCERIDE  ABGs:  No results for input(s): PHART, DVM6ZGM, PO2ART, SJI0AAH, BEART, THGBART, Z3ZQKFTC, UHO2QLP in the last 72 hours. INR: No results for input(s): INR in the last 72 hours. Lactate: No results for input(s): LACTATE in the last 72 hours.   Cultures:  -----------------------------------------------------------------  RAD:   XR CHEST PORTABLE   Final Result      No acute disease          Assessment/Plan:   Marc Chacon is a 46 y.o. male who presents with alcohol withdrawal.     Alcohol Withdrawal  Patient has presented numerous times for alcohol withdrawal. Tried to stop drinking at home over the last 4-5 days.  - Alcohol Withdrawal   - CIWA protocol with ativan  - Daily thiamine, folate & multivitamins  - Seizure precautions  - D5NS  - social work consulted  - librium 50 TID  - bowel rest  - phenergan, zofran  - ethanol level- not detected   - social work consulted     Hyponatremia  Likely 2/2 decreased PO intake and malnutrition  - continue to monitor- currently 131 improving   - on D5NS  - Na q6 hours     Concern for COVID-19  Patient complains of productive cough x2 weeks. Unvaccinated. - f/u COVID test  - CXR- no acute abnormalities seen      Essential Hypertension  Should improve with alleviation of withdrawal sx  - Continue home Lopressor 25 mg   - Treat withdrawal as per above     Macrocytic Anemia  Pt endorses having unsteady gait for the past few months.   - folate, B12 pending  - multivitamin, U57, folic acid ordered      Elevated lipase  Lipase elevated, but does not meet criteria or clinical picture for pancreatitis.   - CT if lipase continues to rise or if patient has abdominal pain- currently no abdominal pain      Code Status: Full  FEN: IVF: NS; Adult diet  PPX: Protonix, Lovenox  DISPO: GMF       Ciro Sinclair MD, PGY-1  01/16/22  7:30 AM

## 2022-01-16 NOTE — PROGRESS NOTES
Pt being discharged from ED. No signature sheet printed with AVS. Discharge instructions given to patient. Pt verbalizes understanding for discharge instructions. IV removed. Questions answered. Pt stable and safe to be discharged home from ED.  Electronically signed by Sushma Rodriguez RN on 1/16/2022 at 12:47 PM

## 2022-01-16 NOTE — DISCHARGE SUMMARY
INTERNAL MEDICINE DEPARTMENT AT [address] [place] [address]  DISCHARGE SUMMARY    Patient ID: Dani Strong                                             Discharge Date: 1/16/2022   Patient's PCP: Antonia Eli MD                                          Discharge Physician: Walt Foss MD MD  Admit Date: 1/15/2022   Admitting Physician: No admitting provider for patient encounter. PROBLEMS DURING HOSPITALIZATION:  Present on Admission:   Alcohol abuse   Alcohol withdrawal, uncomplicated ([address]      DISCHARGE DIAGNOSES:    HPI:  Jus Meza a 46 y. o. male with medical history of hypertension, anxiety and alcohol abuse who presents with nausea and vomiting.  Patient states he usually drinks about a fifth of vodka a day for the past 3 months.  He states he was trying to sober up the past 3-4 days at home by abstaining from alcohol.  He endorses worsening nausea and vomiting that was nonbloody and non bilious over the last day. Gerry Dominguez states he drank a small amount of alcohol yesterday to try to help with him symptoms but has not helped.  Per his record, he has had roughly 7-8 admission for alcohol withdrawal within the last 2 years. Gerry Dominguez denies any chest pain, hallucinations, tremors, anxiety or seizures.  Last drink 3:30 AM on 1/15/22.      He also endorses a cough productive of whitish sputum x2 weeks. He denies fevers, chills, diarrhea, or known sick contacts. He denies ever testing positive for COVID in the past. He has notably not been vaccinated for COVID.     ED course:   Vitals: Hypertensive (151/96), tachycardic (105), tachypneic (20)  Exam: Cardiac, pulmonary, abdominal, MSK exam normal.  Mild tremulousness with extension of arms, mild tongue fasiculations.   Labs: Hyponatremic (129), AG (25), Alk phos (272), AST (100), Lipase (136), Bilirubin (1.2)  Imaging: None  Intervention: Promethazine, Zofran, Lorazepam 2 mg x1, Valium 10 mg x1, LR bolus     The following issues were addressed during hospitalization:    Alcohol Withdrawal  Patient has presented numerous times for alcohol withdrawal. Tried to stop drinking at home over the last 4-5 days. Merrianne Fent was initiated. Daily thiamine, folate & multivitamins were given. D5NS for IV was given. Pt was started on librium 50 TID. Phenergan and zofran was given for his nausea and vomiting. Ethanol level was not detected. Pt was sent home with Valium. .      Hyponatremia  Likely 2/2 decreased PO intake and malnutrition. Pt was on D5NS    Concern for COVID-19  Patient complains of productive cough x2 weeks. Unvaccinated. COVID test was negative with CXR showing no acute abnormalities seen      Essential Hypertension  Continued home Lopressor 25 mg        Macrocytic Anemia  Multivitamin, L12, folic acid was given to patient     Elevated lipase  Lipase elevated, but does not meet criteria or clinical picture for pancreatitis. With no abdominal pain. Patient was seen at bedside and has no further complaints. Patient agrees with the plan we created together and will follow up in 2 week outpatient. Patient denies any further symptoms that need further hospitalizations. Physical exam, vitals and labs were all appreciated and negative for any further inpatient hospitalization. Pt is stable and agrees to be discharge. Time was allotted to further  the patient based on his health issues and recent hospitalization. Patient understands the treatments and the next steps relating to making the patient relatively healthy again.      Constitutional:       Appearance: Normal appearance. He is normal weight. HENT:      Head: Normocephalic and atraumatic.      Mouth/Throat:      Mouth: Mucous membranes are moist.      Pharynx: Oropharynx is clear. Eyes:      Conjunctiva/sclera: Conjunctivae normal.      Pupils: Pupils are equal, round, and reactive to light.    Cardiovascular:      Rate and Rhythm: Regular rhythm. Tachycardia present.      Pulses: Normal pulses.      Heart sounds: Normal heart sounds. Pulmonary:      Effort: Pulmonary effort is normal.      Breath sounds: Normal breath sounds. Abdominal:      General: Abdomen is flat. Bowel sounds are normal.      Palpations: Abdomen is soft. Musculoskeletal:         General: Normal range of motion.      Cervical back: Normal range of motion and neck supple. Skin:     General: Skin is warm and dry.      Capillary Refill: Capillary refill takes less than 2 seconds. Neurological:      Mental Status: He is alert.      Cranial Nerves: Cranial nerves are intact.      Sensory: Sensation is intact.      Motor: Tremor present.      Coordination: Coordination is intact.      Gait: Gait is intact.       Consults: none  Significant Diagnostic Studies:  chest x-ray  Treatments: IV hydration  Disposition: home  Discharged Condition: Stable  Follow Up: Primary Care Physician in two weeks    DISCHARGE MEDICATION:       Medication List      START taking these medications    diazePAM 5 MG tablet  Commonly known as: Valium  Take 2 tablets by mouth every 6 hours as needed (withdrawal) for 2 days, THEN 2 tablets every 8 hours as needed (withdrawal) for 2 days, THEN 1 tablet every 8 hours as needed (withdrawal) for 2 days, THEN 1 tablet every 12 hours as needed (withdrawal) for up to 2 days. Start taking on: January 15, 2022     ondansetron 4 MG disintegrating tablet  Commonly known as: Zofran ODT  Place 1-2 tablets under the tongue every 8 hours as needed for Nausea or Vomiting        CONTINUE taking these medications    gabapentin 300 MG capsule  Commonly known as: NEURONTIN  Take 1 capsule by mouth 2 times daily for 15 days.  Intended supply: 30 days     meloxicam 15 MG tablet  Commonly known as: MOBIC  Take 1 tablet by mouth daily     metoprolol tartrate 25 MG tablet  Commonly known as: LOPRESSOR  TAKE 1 TABLET BY MOUTH EVERY DAY           Where to Get Your Medications      You can get these medications from any pharmacy    Bring a paper prescription for each of these medications  · diazePAM 5 MG tablet  · ondansetron 4 MG disintegrating tablet          Activity: activity as tolerated  Diet: regular diet  Wound Care: none needed    Time Spent on discharge is more than 20 minutes    Signed: Andie Cox MD,  MD, PGY-1  1/16/2022       Addendum:  I have seen the patient independently from the resident . I discussed the care with the resident. I personally reviewed the HPI, PH, FH, SH, ROS and medications. I repeated pertinent portions of the examination and reviewed the relevant imaging and laboratory data. I agree with the findings, assessment and plan as documented, with the following addendum:    Physical Exam  Constitutional:       Appearance: Normal appearance. Neurological:      General: No focal deficit present. Mental Status: He is alert. Psychiatric:         Mood and Affect: Mood normal.         Behavior: Behavior normal.     Ok for dc home on valium x 3 days for withdrawal sx. Educated on avoiding alcohol when on benzo. Pt expresses understanding.       Tasha Argueta M.D.

## 2022-01-16 NOTE — ED NOTES
Bed: C30-30  Expected date:   Expected time:   Means of arrival:   Comments:  Kerry Hassan RN  01/15/22 2030

## 2022-01-17 LAB — SARS-COV-2, PCR: DETECTED

## 2022-01-18 NOTE — ED NOTES
Patient called the ED today, asking for his two prescriptions. In fact, the two paper prescriptions were found here in the ED. Patient asked that these Rxs be called into his [facility]. Called into [facility] [phone]:  1. Diazepam 5mg tabs Disp #38 - take two tabs PO q6h prn withdrawal x 2 days, then 2 tabs PO q8h prn withdrawal x 2 days, then 1 tab PO q8h prn withdrawal x 2 days, then 1 tab PO q12h prn withdrawal x 2 days. Per Dr Batsheva Wall. 2.  Ondansetron ODT 4mg tabs Disp #20. Take 1-2 tabs subLingual q8h prn N/V per Dr Batsheva Wall. Patient with no further questions. Paper Rxs for the above were shredded.       Rodolfo Villatoro PharmD., BCPS   1/18/2022 11:52 AM  Wireless: [phone]

## 2022-01-26 ENCOUNTER — HOSPITAL ENCOUNTER (EMERGENCY)
Age: 42
Discharge: HOME | End: 2022-01-26
Attending: STUDENT IN AN ORGANIZED HEALTH CARE EDUCATION/TRAINING PROGRAM
Payer: MEDICAID

## 2022-01-26 ENCOUNTER — APPOINTMENT (OUTPATIENT)
Dept: GENERAL RADIOLOGY | Age: 42
End: 2022-01-26
Payer: MEDICAID

## 2022-01-26 VITALS
OXYGEN SATURATION: 100 % | TEMPERATURE: 97.7 F | RESPIRATION RATE: 19 BRPM | HEART RATE: 65 BPM | SYSTOLIC BLOOD PRESSURE: 184 MMHG | DIASTOLIC BLOOD PRESSURE: 118 MMHG

## 2022-01-26 DIAGNOSIS — E83.42 HYPOMAGNESEMIA: ICD-10-CM

## 2022-01-26 DIAGNOSIS — R20.2 PARESTHESIA OF BOTH FEET: Primary | ICD-10-CM

## 2022-01-26 LAB
ANION GAP SERPL CALCULATED.3IONS-SCNC: 14 MMOL/L (ref 3–16)
BUN BLDV-MCNC: 6 MG/DL (ref 7–20)
CALCIUM SERPL-MCNC: 9.2 MG/DL (ref 8.3–10.6)
CHLORIDE BLD-SCNC: 98 MMOL/L (ref 99–110)
CO2: 27 MMOL/L (ref 21–32)
CREAT SERPL-MCNC: 0.9 MG/DL (ref 0.9–1.3)
EKG ATRIAL RATE: 70 BPM
EKG DIAGNOSIS: NORMAL
EKG P AXIS: 25 DEGREES
EKG P-R INTERVAL: 180 MS
EKG Q-T INTERVAL: 430 MS
EKG QRS DURATION: 94 MS
EKG QTC CALCULATION (BAZETT): 464 MS
EKG R AXIS: 51 DEGREES
EKG T AXIS: 29 DEGREES
EKG VENTRICULAR RATE: 70 BPM
GFR AFRICAN AMERICAN: >60
GFR NON-AFRICAN AMERICAN: >60
GLUCOSE BLD-MCNC: 79 MG/DL (ref 70–99)
MAGNESIUM: 0.9 MG/DL (ref 1.8–2.4)
MAGNESIUM: 1.4 MG/DL (ref 1.8–2.4)
POTASSIUM REFLEX MAGNESIUM: 3 MMOL/L (ref 3.5–5.1)
POTASSIUM SERPL-SCNC: 3.1 MMOL/L (ref 3.5–5.1)
PRO-BNP: 1830 PG/ML (ref 0–124)
SODIUM BLD-SCNC: 139 MMOL/L (ref 136–145)
T4 FREE: 1.1 NG/DL (ref 0.9–1.8)
TROPONIN: <0.01 NG/ML
TSH REFLEX: 2.15 UIU/ML (ref 0.27–4.2)

## 2022-01-26 PROCEDURE — 6370000000 HC RX 637 (ALT 250 FOR IP): Performed by: STUDENT IN AN ORGANIZED HEALTH CARE EDUCATION/TRAINING PROGRAM

## 2022-01-26 PROCEDURE — 83880 ASSAY OF NATRIURETIC PEPTIDE: CPT

## 2022-01-26 PROCEDURE — 96365 THER/PROPH/DIAG IV INF INIT: CPT

## 2022-01-26 PROCEDURE — 93005 ELECTROCARDIOGRAM TRACING: CPT | Performed by: STUDENT IN AN ORGANIZED HEALTH CARE EDUCATION/TRAINING PROGRAM

## 2022-01-26 PROCEDURE — 99284 EMERGENCY DEPT VISIT MOD MDM: CPT

## 2022-01-26 PROCEDURE — 83735 ASSAY OF MAGNESIUM: CPT

## 2022-01-26 PROCEDURE — 84484 ASSAY OF TROPONIN QUANT: CPT

## 2022-01-26 PROCEDURE — 84443 ASSAY THYROID STIM HORMONE: CPT

## 2022-01-26 PROCEDURE — 80048 BASIC METABOLIC PNL TOTAL CA: CPT

## 2022-01-26 PROCEDURE — 36415 COLL VENOUS BLD VENIPUNCTURE: CPT

## 2022-01-26 PROCEDURE — 71045 X-RAY EXAM CHEST 1 VIEW: CPT

## 2022-01-26 PROCEDURE — 6360000002 HC RX W HCPCS: Performed by: STUDENT IN AN ORGANIZED HEALTH CARE EDUCATION/TRAINING PROGRAM

## 2022-01-26 PROCEDURE — 84439 ASSAY OF FREE THYROXINE: CPT

## 2022-01-26 PROCEDURE — 84132 ASSAY OF SERUM POTASSIUM: CPT

## 2022-01-26 RX ORDER — POTASSIUM CHLORIDE 20 MEQ/1
40 TABLET, EXTENDED RELEASE ORAL ONCE
Status: COMPLETED | OUTPATIENT
Start: 2022-01-26 | End: 2022-01-26

## 2022-01-26 RX ORDER — MAGNESIUM OXIDE 400 MG/1
400 TABLET ORAL 2 TIMES DAILY
Qty: 30 TABLET | Refills: 0 | Status: SHIPPED | OUTPATIENT
Start: 2022-01-26

## 2022-01-26 RX ORDER — MAGNESIUM SULFATE IN WATER 40 MG/ML
2000 INJECTION, SOLUTION INTRAVENOUS ONCE
Status: COMPLETED | OUTPATIENT
Start: 2022-01-26 | End: 2022-01-26

## 2022-01-26 RX ADMIN — POTASSIUM CHLORIDE 40 MEQ: 1500 TABLET, EXTENDED RELEASE ORAL at 19:39

## 2022-01-26 RX ADMIN — MAGNESIUM SULFATE HEPTAHYDRATE 2000 MG: 2 INJECTION, SOLUTION INTRAVENOUS at 18:36

## 2022-01-26 ASSESSMENT — ENCOUNTER SYMPTOMS
EYE REDNESS: 0
COUGH: 0
VOMITING: 0
ABDOMINAL PAIN: 0
SHORTNESS OF BREATH: 0
NAUSEA: 0

## 2022-01-26 NOTE — ED PROVIDER NOTES
[facility]          PHYSICIAN ASSISTANT NOTE       Date of evaluation: 1/26/2022    Chief Complaint     Other (pt states, \"My feet and calfs have been swelling and tingling for about 4 months. \")      History of Present Illness     Fiona Ulloa is a 46 y.o. male with PMH of alcohol abuse, HTN, recent COVID+ who presents with complaints of numbness and tingling in his feet for the past 3 months. Patient states this has also been associated with aching myalgia type pain and he states that he noticed swelling to bilateral ankles today, which prompted his ED visit. Of note, patient reports that he was recently admitted for alcohol withdrawal on 1/15 and has been sober since that time. He also tested positive for COVID 1/15 but denies any symptoms at this time. Specifically, he denies fevers, chills, headache, dizziness, chest pain or shortness of breath, nausea or vomiting, abdominal pain, back pain, incontinence of bowel or bladder, urinary retention, saddle paresthesias. Denies any trauma or injury to the lower extremities. Review of Systems     Review of Systems   Constitutional: Negative for chills and fever. HENT: Negative for congestion. Eyes: Negative for redness. Respiratory: Negative for cough and shortness of breath. Cardiovascular: Positive for leg swelling. Negative for chest pain. Gastrointestinal: Negative for abdominal pain, nausea and vomiting. Genitourinary: Negative for difficulty urinating. Musculoskeletal: Negative for gait problem. Skin: Negative for wound. Neurological: Positive for numbness. Negative for dizziness and headaches. Psychiatric/Behavioral: The patient is not nervous/anxious. Past Medical, Surgical, Family, and Social History     He has a past medical history of Alcohol abuse, Anxiety, and Hypertension. He has no past surgical history on file.   His family history includes Colon Cancer in his father; Glaucoma in his father. He reports that he has been smoking e-cigarettes. He has been smoking about 0.00 packs per day. He has never used smokeless tobacco. He reports current alcohol use. He reports that he does not use drugs. Medications     Previous Medications    GABAPENTIN (NEURONTIN) 300 MG CAPSULE    Take 1 capsule by mouth 2 times daily for 15 days. Intended supply: 30 days    MELOXICAM (MOBIC) 15 MG TABLET    Take 1 tablet by mouth daily    METOPROLOL TARTRATE (LOPRESSOR) 25 MG TABLET    TAKE 1 TABLET BY MOUTH EVERY DAY    ONDANSETRON (ZOFRAN ODT) 4 MG DISINTEGRATING TABLET    Place 1-2 tablets under the tongue every 8 hours as needed for Nausea or Vomiting       Allergies     He is allergic to amlodipine, banana, carvedilol, and tree nut  [macadamia nut oil]. Physical Exam     INITIAL VITALS: BP: (!) 173/110,Temp: 97.7 °F (36.5 °C), Pulse: 62, Resp: 18, SpO2: 100 %   Physical Exam  Vitals and nursing note reviewed. Constitutional:       General: He is not in acute distress. HENT:      Head: Normocephalic and atraumatic. Eyes:      Extraocular Movements: Extraocular movements intact. Conjunctiva/sclera: Conjunctivae normal.   Cardiovascular:      Rate and Rhythm: Normal rate and regular rhythm. Pulmonary:      Effort: Pulmonary effort is normal. No respiratory distress. Breath sounds: Normal breath sounds. No wheezing, rhonchi or rales. Abdominal:      General: Bowel sounds are normal. There is no distension. Palpations: Abdomen is soft. Tenderness: There is no abdominal tenderness. There is no guarding or rebound. Musculoskeletal:         General: No deformity. Cervical back: Neck supple. Thoracic back: No deformity or bony tenderness. Lumbar back: No deformity or bony tenderness. Comments: Trace symmetric bilateral lower extremity edema. No focal tenderness. No erythema, increased warmth. DP and PT pulses 2+. SILT.    Skin:     General: Skin is warm and dry. Neurological:      Mental Status: He is alert and oriented to person, place, and time. Psychiatric:         Mood and Affect: Mood normal.         Behavior: Behavior normal.         Diagnostic Results     RADIOLOGY:  No orders to display       LABS:   No results found for this visit on 01/26/22. RECENT VITALS:  BP: (!) 186/113, Temp: 97.7 °F (36.5 °C), Pulse: 62,Resp: 18, SpO2: 100 %     Procedures         ED Course     Nursing Notes, Past Medical Hx, Past Surgical Hx, Social Hx, Allergies, and Family Hx were reviewed. The patient was given the followingmedications:  No orders of the defined types were placed in this encounter. CONSULTS:  None    MEDICAL DECISION MAKING / ASSESSMENT / PLAN     Dani tSrong is a 46 y.o. male with PMH of alcohol abuse, HTN, recent COVID+ who presents with complaints of numbness and tingling in his feet for the past 3 months along with aching pain and swelling. Exam reveals a hypertensive 68-year-old male in no acute distress. He is overall well-appearing. Heart rhythm and rate are regular. Lungs clear to auscultation. Abdomen soft and nontender. No spinal tenderness or step-off. There is trace edema that is symmetric to the bilateral lower extremities. No focal tenderness. No overlying warmth or erythema. Sensation is intact to light touch. 2+ DP and PT pulses. Overall low suspicion for DVT based on patient's exam and given that his symptoms are bilateral.  No trauma to warrant imaging. Low suspicion for spinal pathology as patient has no back pain, true numbness or weakness. No erythema or warmth to suggest infection. This likely represents neuropathy. Patient recently did have electrolytes and vitamin B12 checked, which were within normal limits. Given his recent COVID-19 diagnosis, will screen with BMP, BNP and TSH/free T4. Suspect that patient will ultimately be able to be discharged with follow up in [address] [place] [address]. This patient was also evaluated by the attending physician. All care plans were discussed and agreed upon. Clinical Impression     1.  Paresthesia of both feet             Liliya Pathak PA-C  01/26/22 0956

## 2022-01-26 NOTE — ED PROVIDER NOTES
ED Attending Attestation Note     Date of evaluation: 1/26/2022    This patient was seen by the advanced practice provider. I have seen and examined the patient, agree with the workup, evaluation, management and diagnosis. The care plan has been discussed. My assessment reveals a gentleman with hx AUD, HTN, recent COVID presneting with concern for leg swelling. He describes occ tingling to others, but to me he describes more of a mild myalgia pain in the bilateral calves. Denies HA, CP, SOB, urine habit changes. On exam:  Eyes: Sclera clear, pupils equally round and reactive to light  Pulm: Lungs clear to auscultation bilaterally, no increased WOB  Cor: Regular rhythm, normal rate, no murmurs  Abd: Soft, nontender, nondistended, no masses  Extr: warm, well perfused. There is trace, barely appreciable BLE edema which is symmetric. Calves are soft, easily compressible. There is no overlying warmth, erythema. I have a low suspicion for DVT based on clinical exam. Kelsey Baba for DVT 0. No new trauma. No true numbness, weakness - low suspicion for spinal patholgoy. Given recent COVID, will screen with BNP and TSH/FT4.          Len Bowens MD  01/26/22 0388

## 2022-01-27 NOTE — ED NOTES
Pt is alert and oriented times four. Pt here for numbness and tingling to Blat legs. Pt states  It started about 4 months ago and states that he has pain when walking.       Reynaldo Peres RN  01/26/22 1927

## 2022-05-30 ENCOUNTER — HOSPITAL ENCOUNTER (EMERGENCY)
Age: 42
Discharge: HOME | End: 2022-05-30
Payer: MEDICAID

## 2022-05-30 VITALS
HEIGHT: 69 IN | TEMPERATURE: 97.7 F | OXYGEN SATURATION: 94 % | RESPIRATION RATE: 18 BRPM | BODY MASS INDEX: 46.65 KG/M2 | DIASTOLIC BLOOD PRESSURE: 104 MMHG | SYSTOLIC BLOOD PRESSURE: 151 MMHG | HEART RATE: 98 BPM | WEIGHT: 315 LBS

## 2022-05-30 DIAGNOSIS — S61.211A LACERATION OF LEFT INDEX FINGER WITHOUT FOREIGN BODY WITHOUT DAMAGE TO NAIL, INITIAL ENCOUNTER: Primary | ICD-10-CM

## 2022-05-30 PROCEDURE — 2500000003 HC RX 250 WO HCPCS: Performed by: PHYSICIAN ASSISTANT

## 2022-05-30 PROCEDURE — 6360000002 HC RX W HCPCS: Performed by: PHYSICIAN ASSISTANT

## 2022-05-30 PROCEDURE — 99284 EMERGENCY DEPT VISIT MOD MDM: CPT

## 2022-05-30 PROCEDURE — 90471 IMMUNIZATION ADMIN: CPT | Performed by: PHYSICIAN ASSISTANT

## 2022-05-30 PROCEDURE — 12001 RPR S/N/AX/GEN/TRNK 2.5CM/<: CPT

## 2022-05-30 PROCEDURE — 90715 TDAP VACCINE 7 YRS/> IM: CPT | Performed by: PHYSICIAN ASSISTANT

## 2022-05-30 RX ORDER — LIDOCAINE HYDROCHLORIDE AND EPINEPHRINE 10; 10 MG/ML; UG/ML
20 INJECTION, SOLUTION INFILTRATION; PERINEURAL ONCE
Status: COMPLETED | OUTPATIENT
Start: 2022-05-30 | End: 2022-05-30

## 2022-05-30 RX ADMIN — TETANUS TOXOID, REDUCED DIPHTHERIA TOXOID AND ACELLULAR PERTUSSIS VACCINE, ADSORBED 0.5 ML: 5; 2.5; 8; 8; 2.5 SUSPENSION INTRAMUSCULAR at 15:44

## 2022-05-30 RX ADMIN — LIDOCAINE HYDROCHLORIDE AND EPINEPHRINE 20 ML: 10; 10 INJECTION, SOLUTION INFILTRATION; PERINEURAL at 15:47

## 2022-05-30 ASSESSMENT — PAIN DESCRIPTION - ORIENTATION: ORIENTATION: LEFT

## 2022-05-30 ASSESSMENT — PAIN - FUNCTIONAL ASSESSMENT: PAIN_FUNCTIONAL_ASSESSMENT: NONE - DENIES PAIN

## 2022-05-30 ASSESSMENT — ENCOUNTER SYMPTOMS
NAUSEA: 0
VOMITING: 0

## 2022-05-30 ASSESSMENT — PAIN SCALES - GENERAL: PAINLEVEL_OUTOF10: 5

## 2022-05-30 NOTE — ED PROVIDER NOTES
[address] ENCOUNTER          PHYSICIAN ASSISTANT NOTE       Date of evaluation: 5/30/2022    Chief Complaint     Hand Injury (L)      History of Present Illness     Shaka Schwartz is a 38 y.o. right hand dominant male who presents with left index finger laceration sustained just PTA while at work. Patient reports he was cleaning a slicer when he cut his left index finger. He was unable to get the bleeding under control so he presented to the ED. He denies any difficulty ranging the finger. Denies any numbness/tingling. He is unsure of his last tetanus vaccination. Review of Systems     Review of Systems   Constitutional: Negative for chills and fever. Gastrointestinal: Negative for nausea and vomiting. Skin: Positive for wound. Neurological: Negative for weakness and numbness. All other systems reviewed and are negative. Past Medical, Surgical, Family, and Social History     He has a past medical history of Alcohol abuse, Anxiety, and Hypertension. He has no past surgical history on file. His family history includes Colon Cancer in his father; Glaucoma in his father. He reports that he has been smoking e-cigarettes. He has been smoking about 0.00 packs per day. He has never used smokeless tobacco. He reports current alcohol use. He reports that he does not use drugs. Medications     Current Discharge Medication List      CONTINUE these medications which have NOT CHANGED    Details   magnesium oxide (MAG-OX) 400 MG tablet Take 1 tablet by mouth 2 times daily  Qty: 30 tablet, Refills: 0      ondansetron (ZOFRAN ODT) 4 MG disintegrating tablet Place 1-2 tablets under the tongue every 8 hours as needed for Nausea or Vomiting  Qty: 20 tablet, Refills: 0      meloxicam (MOBIC) 15 MG tablet Take 1 tablet by mouth daily  Qty: 30 tablet, Refills: 0      gabapentin (NEURONTIN) 300 MG capsule Take 1 capsule by mouth 2 times daily for 15 days.  Intended supply: 30 days  Qty: 30 capsule, Refills: 0      metoprolol tartrate (LOPRESSOR) 25 MG tablet TAKE 1 TABLET BY MOUTH EVERY DAY  Qty: 90 tablet, Refills: 3    Associated Diagnoses: Hypertension, unspecified type             Allergies     He is allergic to amlodipine, banana, carvedilol, and tree nut  [macadamia nut oil]. Physical Exam     INITIAL VITALS: BP: (!) 151/104,Temp: 97.7 °F (36.5 °C), Heart Rate: 98, Resp: 18, SpO2: 94 %   Physical Exam  Vitals and nursing note reviewed. Constitutional:       General: He is not in acute distress. HENT:      Head: Normocephalic and atraumatic. Eyes:      Extraocular Movements: Extraocular movements intact. Conjunctiva/sclera: Conjunctivae normal.   Pulmonary:      Effort: Pulmonary effort is normal. No respiratory distress. Musculoskeletal:         General: No deformity. Cervical back: Neck supple. Skin:     General: Skin is warm and dry. Comments: Laceration on the dorsal surface of the left index finger just distal to the PIP joint with active oozing of blood. Intact ROM to the DIP, PIP and MCP joint of the finger. SILT. <3 sec cap refill. Neurological:      Mental Status: He is alert and oriented to person, place, and time. Psychiatric:         Mood and Affect: Mood normal.         Behavior: Behavior normal.         Diagnostic Results     RADIOLOGY:  No orders to display       LABS:   No results found for this visit on 05/30/22. RECENT VITALS:  BP: (!) 151/104, Temp: 97.7 °F (36.5 °C), Heart Rate: 98,Resp: 18, SpO2: 94 %     Procedures     Lac Repair    Date/Time: 5/30/2022 4:01 PM  Performed by: General Mark PA-C  Authorized by: General Mark PA-C     Consent:     Consent obtained:  Verbal    Consent given by:  Patient    Risks discussed:  Pain and infection  Anesthesia (see MAR for exact dosages):      Anesthesia method:  Local infiltration    Local anesthetic:  Lidocaine 1% WITH epi  Laceration details:     Length (cm):  1.5  Repair type: Repair type:  Simple  Pre-procedure details:     Preparation:  Patient was prepped and draped in usual sterile fashion  Treatment:     Area cleansed with:  Saline    Amount of cleaning:  Standard  Skin repair:     Repair method:  Sutures    Suture size:  5-0    Suture material:  Prolene    Suture technique:  Simple interrupted    Number of sutures:  3  Approximation:     Approximation:  Close  Post-procedure details:     Dressing:  Non-adherent dressing    Patient tolerance of procedure: Tolerated well, no immediate complications      ED Course     Nursing Notes, Past Medical Hx, Past Surgical Hx, Social Hx, Allergies, and Family Hx were reviewed. The patient was given the followingmedications:  Orders Placed This Encounter   Medications    lidocaine-EPINEPHrine 1 %-1:[id number] injection 20 mL    Tetanus-Diphth-Acell Pertussis (BOOSTRIX) injection 0.5 mL       CONSULTS:  None    MEDICAL DECISION MAKING / ASSESSMENT / Colonel Nathalia is a 38 y.o. right hand dominant male who presents with left index finger laceration just distal to PIP joint sustained just PTA while cleaning a slicer at work. Patient denies difficulty ranging finger, numbness/tingling. Tetanus not UTD. On exam, patient has laceration on the dorsal surface of the left index finger just distal to the PIP joint. Intact ROM to the DIP, PIP and MCP joint of the finger. SILT. <3 sec cap refill. Patient's tetanus was updated. With local infiltration of lidocaine with epinephrine as well as pressure, bleeding was controlled. Suspect bleeding 2/2 venous source rather than arterial.   Wound was irrigated and repaired per procedure note above. Patient instructed on wound care and suture removal in 7 to 10 days. He is instructed to monitor wound for signs of infection. At this point in time, patient is stable for discharge.  Patient was given strict return precautions as outlined in the AVS. Patient was agreeable and understanding to this plan of care. Prior to discharge, patient was ambulatory and PO tolerant. I saw this patient independently as the advance practice provider. The patient was not seen or evaluated by the attending physician. Clinical Impression     1. Laceration of left index finger without foreign body without damage to nail, initial encounter        Disposition     PATIENT REFERRED TO:  [facility] Emergency Department  [address] [place] [address]  [address]  [phone]  In 1 week  For suture removal      DISCHARGE MEDICATIONS:  Current Discharge Medication List          DISPOSITION  Discharge.        Zelda Munoz PA-C  05/30/22 0564

## 2022-08-25 DIAGNOSIS — I10 HYPERTENSION, UNSPECIFIED TYPE: ICD-10-CM

## 2022-08-25 NOTE — TELEPHONE ENCOUNTER
Patient has not been seen in the resident clinic since July 2021. By chart review he has had significant worsening of his blood pressures, although these are in the context of emergency room visits and hospital admissions. He must schedule an appointment for further refills. Will prescribe for 30 days to bridge him until he can come in and be evaluated.

## 2022-08-26 ENCOUNTER — TELEPHONE (OUTPATIENT)
Dept: INTERNAL MEDICINE CLINIC | Age: 42
End: 2022-08-26

## 2022-08-26 NOTE — TELEPHONE ENCOUNTER
Called patient to make appt per Dr. Chyna Foy , he did not remember his bd, called pharmacy to tell patient he needs to make appt has not been seen since 7/16/2021

## 2022-09-23 DIAGNOSIS — I10 HYPERTENSION, UNSPECIFIED TYPE: ICD-10-CM

## 2022-11-17 DIAGNOSIS — I10 HYPERTENSION, UNSPECIFIED TYPE: ICD-10-CM

## 2022-11-17 NOTE — TELEPHONE ENCOUNTER
Patient really needs to follow-up as patient has not been seen in the clinic for over a year. Again refilled for one month to allow patient to schedule follow-up.

## 2022-11-17 NOTE — TELEPHONE ENCOUNTER
Requested Prescriptions     Pending Prescriptions Disp Refills    metoprolol tartrate (LOPRESSOR) 25 MG tablet [Pharmacy Med Name: METOPROLOL TARTRATE 25 MG TAB] 30 tablet 0     Sig: TAKE 1 TABLET BY MOUTH EVERY DAY       Last Clinic Visit:  7/16/2021     Next Clinic Appointment:  Visit date not found

## 2022-11-18 ENCOUNTER — TELEPHONE (OUTPATIENT)
Dept: INTERNAL MEDICINE CLINIC | Age: 42
End: 2022-11-18

## 2022-11-18 NOTE — TELEPHONE ENCOUNTER
I called patient on phone number of record [phone]  I asked if I was speaking to ETHERA. Person said no and hung up. Will send a letter to patient that he needs to be seen ASAP. Also asked [facility] to notify patient when he picks up his refill.

## 2022-12-14 DIAGNOSIS — I10 HYPERTENSION, UNSPECIFIED TYPE: ICD-10-CM

## 2022-12-29 DIAGNOSIS — I10 HYPERTENSION, UNSPECIFIED TYPE: ICD-10-CM

## 2022-12-29 NOTE — TELEPHONE ENCOUNTER
Requested Prescriptions     Pending Prescriptions Disp Refills    metoprolol tartrate (LOPRESSOR) 25 MG tablet [Pharmacy Med Name: METOPROLOL TARTRATE 25 MG TAB] 30 tablet 0     Sig: TAKE 1/2 TABLET BY MOUTH TWICE A DAY       Last Clinic Visit:  7/16/2021     Next Clinic Appointment:  Visit date not found

## 2023-02-24 DIAGNOSIS — I10 HYPERTENSION, UNSPECIFIED TYPE: ICD-10-CM

## 2023-03-18 DIAGNOSIS — I10 HYPERTENSION, UNSPECIFIED TYPE: ICD-10-CM

## 2023-04-16 DIAGNOSIS — I10 HYPERTENSION, UNSPECIFIED TYPE: ICD-10-CM

## 2023-05-20 DIAGNOSIS — I10 HYPERTENSION, UNSPECIFIED TYPE: ICD-10-CM

## 2023-06-08 DIAGNOSIS — I10 HYPERTENSION, UNSPECIFIED TYPE: ICD-10-CM

## 2023-06-09 NOTE — TELEPHONE ENCOUNTER
Requested Prescriptions     Pending Prescriptions Disp Refills    metoprolol tartrate (LOPRESSOR) 25 MG tablet [Pharmacy Med Name: METOPROLOL TARTRATE 25 MG TAB] 30 tablet 0     Sig: TAKE 1/2 TABLET BY MOUTH TWICE A DAY       Last Clinic Visit:  7/16/2021     Next Clinic Appointment:  Visit date not found    His pharmacy gave him 3 day  emergency refill. We cannot get him in until next Thursday at earliest.  I attempted to contact him . His voice mail not set up.

## 2023-07-02 DIAGNOSIS — I10 HYPERTENSION, UNSPECIFIED TYPE: ICD-10-CM

## 2023-07-21 ENCOUNTER — OFFICE VISIT (OUTPATIENT)
Dept: INTERNAL MEDICINE CLINIC | Age: 43
End: 2023-07-21
Payer: MEDICAID

## 2023-07-21 VITALS
HEIGHT: 69 IN | TEMPERATURE: 98.3 F | OXYGEN SATURATION: 97 % | DIASTOLIC BLOOD PRESSURE: 92 MMHG | BODY MASS INDEX: 26.59 KG/M2 | RESPIRATION RATE: 20 BRPM | SYSTOLIC BLOOD PRESSURE: 131 MMHG | WEIGHT: 179.5 LBS | HEART RATE: 87 BPM

## 2023-07-21 DIAGNOSIS — I10 HYPERTENSION, UNSPECIFIED TYPE: Primary | ICD-10-CM

## 2023-07-21 PROCEDURE — 99213 OFFICE O/P EST LOW 20 MIN: CPT

## 2023-10-09 ENCOUNTER — HOSPITAL ENCOUNTER (EMERGENCY)
Age: 43
Discharge: HOME | End: 2023-10-09
Attending: EMERGENCY MEDICINE
Payer: MEDICAID

## 2023-10-09 VITALS
RESPIRATION RATE: 18 BRPM | SYSTOLIC BLOOD PRESSURE: 152 MMHG | WEIGHT: 178.35 LBS | TEMPERATURE: 98.5 F | BODY MASS INDEX: 26.42 KG/M2 | DIASTOLIC BLOOD PRESSURE: 105 MMHG | OXYGEN SATURATION: 97 % | HEIGHT: 69 IN | HEART RATE: 105 BPM

## 2023-10-09 DIAGNOSIS — E86.0 DEHYDRATION: ICD-10-CM

## 2023-10-09 DIAGNOSIS — F10.20 ALCOHOLISM (HCC): Primary | ICD-10-CM

## 2023-10-09 LAB
ANION GAP SERPL CALCULATED.3IONS-SCNC: 25 MMOL/L (ref 3–16)
BASOPHILS # BLD: 0.1 K/UL (ref 0–0.2)
BASOPHILS NFR BLD: 2.3 %
BUN SERPL-MCNC: 11 MG/DL (ref 7–20)
CALCIUM SERPL-MCNC: 9.4 MG/DL (ref 8.3–10.6)
CHLORIDE SERPL-SCNC: 98 MMOL/L (ref 99–110)
CO2 SERPL-SCNC: 19 MMOL/L (ref 21–32)
CREAT SERPL-MCNC: 1.1 MG/DL (ref 0.9–1.3)
DEPRECATED RDW RBC AUTO: 18.7 % (ref 12.4–15.4)
EKG ATRIAL RATE: 110 BPM
EKG DIAGNOSIS: NORMAL
EKG P AXIS: 59 DEGREES
EKG P-R INTERVAL: 166 MS
EKG Q-T INTERVAL: 352 MS
EKG QRS DURATION: 88 MS
EKG QTC CALCULATION (BAZETT): 476 MS
EKG R AXIS: 65 DEGREES
EKG T AXIS: 73 DEGREES
EKG VENTRICULAR RATE: 110 BPM
EOSINOPHIL # BLD: 0.1 K/UL (ref 0–0.6)
EOSINOPHIL NFR BLD: 2.6 %
ETHANOLAMINE SERPL-MCNC: 397 MG/DL (ref 0–0.08)
GFR SERPLBLD CREATININE-BSD FMLA CKD-EPI: >60 ML/MIN/{1.73_M2}
GLUCOSE SERPL-MCNC: 88 MG/DL (ref 70–99)
HCT VFR BLD AUTO: 31.8 % (ref 40.5–52.5)
HGB BLD-MCNC: 10.5 G/DL (ref 13.5–17.5)
LYMPHOCYTES # BLD: 1.5 K/UL (ref 1–5.1)
LYMPHOCYTES NFR BLD: 34.6 %
MCH RBC QN AUTO: 32.8 PG (ref 26–34)
MCHC RBC AUTO-ENTMCNC: 33 G/DL (ref 31–36)
MCV RBC AUTO: 99.5 FL (ref 80–100)
MONOCYTES # BLD: 0.4 K/UL (ref 0–1.3)
MONOCYTES NFR BLD: 8.9 %
NEUTROPHILS # BLD: 2.2 K/UL (ref 1.7–7.7)
NEUTROPHILS NFR BLD: 51.6 %
PLATELET # BLD AUTO: 341 K/UL (ref 135–450)
PMV BLD AUTO: 6.6 FL (ref 5–10.5)
POTASSIUM SERPL-SCNC: 3.7 MMOL/L (ref 3.5–5.1)
RBC # BLD AUTO: 3.2 M/UL (ref 4.2–5.9)
SODIUM SERPL-SCNC: 142 MMOL/L (ref 136–145)
WBC # BLD AUTO: 4.2 K/UL (ref 4–11)

## 2023-10-09 PROCEDURE — 82077 ASSAY SPEC XCP UR&BREATH IA: CPT

## 2023-10-09 PROCEDURE — 96374 THER/PROPH/DIAG INJ IV PUSH: CPT

## 2023-10-09 PROCEDURE — 85025 COMPLETE CBC W/AUTO DIFF WBC: CPT

## 2023-10-09 PROCEDURE — 36415 COLL VENOUS BLD VENIPUNCTURE: CPT

## 2023-10-09 PROCEDURE — 6360000002 HC RX W HCPCS: Performed by: NURSE PRACTITIONER

## 2023-10-09 PROCEDURE — 80048 BASIC METABOLIC PNL TOTAL CA: CPT

## 2023-10-09 PROCEDURE — 93005 ELECTROCARDIOGRAM TRACING: CPT | Performed by: EMERGENCY MEDICINE

## 2023-10-09 PROCEDURE — 2580000003 HC RX 258: Performed by: NURSE PRACTITIONER

## 2023-10-09 PROCEDURE — 99284 EMERGENCY DEPT VISIT MOD MDM: CPT

## 2023-10-09 RX ORDER — ONDANSETRON 2 MG/ML
4 INJECTION INTRAMUSCULAR; INTRAVENOUS ONCE
Status: COMPLETED | OUTPATIENT
Start: 2023-10-09 | End: 2023-10-09

## 2023-10-09 RX ORDER — SODIUM CHLORIDE, SODIUM LACTATE, POTASSIUM CHLORIDE, AND CALCIUM CHLORIDE .6; .31; .03; .02 G/100ML; G/100ML; G/100ML; G/100ML
1000 INJECTION, SOLUTION INTRAVENOUS ONCE
Status: COMPLETED | OUTPATIENT
Start: 2023-10-09 | End: 2023-10-09

## 2023-10-09 RX ADMIN — ONDANSETRON 4 MG: 2 INJECTION INTRAMUSCULAR; INTRAVENOUS at 17:44

## 2023-10-09 RX ADMIN — SODIUM CHLORIDE, POTASSIUM CHLORIDE, SODIUM LACTATE AND CALCIUM CHLORIDE 1000 ML: 600; 310; 30; 20 INJECTION, SOLUTION INTRAVENOUS at 17:48

## 2023-10-09 ASSESSMENT — PAIN - FUNCTIONAL ASSESSMENT: PAIN_FUNCTIONAL_ASSESSMENT: NONE - DENIES PAIN

## 2023-10-09 ASSESSMENT — ENCOUNTER SYMPTOMS
RESPIRATORY NEGATIVE: 1
DIARRHEA: 0
ABDOMINAL PAIN: 0
VOMITING: 0
NAUSEA: 1

## 2023-10-09 NOTE — DISCHARGE INSTRUCTIONS
- recommend continuing hydration with clear fluids    - contact Kellen for further help with alcoholism    - return to the ED for worsening symptoms/concerns

## 2023-10-09 NOTE — ED PROVIDER NOTES
ED Attending Attestation Note     Date of evaluation: 10/9/2023    This patient was seen by the advance practice provider. I have seen and examined the patient, agree with the workup, evaluation, management and diagnosis. The care plan has been discussed. My assessment reveals a 29-year-old -American male who presents with alcohol intoxication. On assessment here he is ambulatory without assistance and does not clinically appear intoxicated though he does have a fairly elevated serum ethanol level.   No signs of withdrawal..       Nataly Randall MD  10/09/23 7517

## 2023-11-05 ENCOUNTER — HOSPITAL ENCOUNTER (INPATIENT)
Age: 43
LOS: 1 days | Discharge: HOME | DRG: 469 | End: 2023-11-06
Attending: EMERGENCY MEDICINE | Admitting: INTERNAL MEDICINE
Payer: MEDICAID

## 2023-11-05 DIAGNOSIS — R11.2 NAUSEA AND VOMITING, UNSPECIFIED VOMITING TYPE: ICD-10-CM

## 2023-11-05 DIAGNOSIS — E86.0 DEHYDRATION: Primary | ICD-10-CM

## 2023-11-05 PROBLEM — N17.9 AKI (ACUTE KIDNEY INJURY) (HCC): Status: ACTIVE | Noted: 2023-11-05

## 2023-11-05 PROBLEM — R74.01 TRANSAMINITIS: Status: ACTIVE | Noted: 2023-11-05

## 2023-11-05 LAB
ALBUMIN SERPL-MCNC: 4.3 G/DL (ref 3.4–5)
ALBUMIN SERPL-MCNC: 4.4 G/DL (ref 3.4–5)
ALBUMIN SERPL-MCNC: 5.3 G/DL (ref 3.4–5)
ALP SERPL-CCNC: 156 U/L (ref 40–129)
ALP SERPL-CCNC: 204 U/L (ref 40–129)
ALT SERPL-CCNC: 42 U/L (ref 10–40)
ALT SERPL-CCNC: 62 U/L (ref 10–40)
AMPHETAMINES UR QL SCN>1000 NG/ML: NORMAL
ANION GAP SERPL CALCULATED.3IONS-SCNC: 12 MMOL/L (ref 3–16)
ANION GAP SERPL CALCULATED.3IONS-SCNC: 39 MMOL/L (ref 3–16)
AST SERPL-CCNC: 119 U/L (ref 15–37)
AST SERPL-CCNC: 187 U/L (ref 15–37)
BARBITURATES UR QL SCN>200 NG/ML: NORMAL
BASE EXCESS BLDV CALC-SCNC: -6.5 MMOL/L (ref -2–3)
BASOPHILS # BLD: 0.1 K/UL (ref 0–0.2)
BASOPHILS NFR BLD: 1.1 %
BENZODIAZ UR QL SCN>200 NG/ML: NORMAL
BILIRUB DIRECT SERPL-MCNC: 0.4 MG/DL (ref 0–0.3)
BILIRUB DIRECT SERPL-MCNC: 0.7 MG/DL (ref 0–0.3)
BILIRUB INDIRECT SERPL-MCNC: 0.8 MG/DL (ref 0–1)
BILIRUB INDIRECT SERPL-MCNC: 1 MG/DL (ref 0–1)
BILIRUB SERPL-MCNC: 1.4 MG/DL (ref 0–1)
BILIRUB SERPL-MCNC: 1.5 MG/DL (ref 0–1)
BUN SERPL-MCNC: 15 MG/DL (ref 7–20)
BUN SERPL-MCNC: 16 MG/DL (ref 7–20)
CALCIUM SERPL-MCNC: 10.2 MG/DL (ref 8.3–10.6)
CALCIUM SERPL-MCNC: 8.9 MG/DL (ref 8.3–10.6)
CANNABINOIDS UR QL SCN>50 NG/ML: NORMAL
CHLORIDE SERPL-SCNC: 82 MMOL/L (ref 99–110)
CHLORIDE SERPL-SCNC: 92 MMOL/L (ref 99–110)
CO2 BLDV-SCNC: 20 MMOL/L
CO2 SERPL-SCNC: 16 MMOL/L (ref 21–32)
CO2 SERPL-SCNC: 31 MMOL/L (ref 21–32)
COCAINE UR QL SCN: NORMAL
COHGB MFR BLDV: 1.5 % (ref 0–1.5)
CREAT SERPL-MCNC: 1.4 MG/DL (ref 0.9–1.3)
CREAT SERPL-MCNC: 1.8 MG/DL (ref 0.9–1.3)
CREAT UR-MCNC: 356.4 MG/DL (ref 39–259)
DEPRECATED RDW RBC AUTO: 17.1 % (ref 12.4–15.4)
DO-HGB MFR BLDV: 28.9 %
DRUG SCREEN COMMENT UR-IMP: NORMAL
EKG ATRIAL RATE: 145 BPM
EKG ATRIAL RATE: 94 BPM
EKG DIAGNOSIS: NORMAL
EKG DIAGNOSIS: NORMAL
EKG P AXIS: 71 DEGREES
EKG P AXIS: 75 DEGREES
EKG P-R INTERVAL: 128 MS
EKG P-R INTERVAL: 166 MS
EKG Q-T INTERVAL: 344 MS
EKG Q-T INTERVAL: 374 MS
EKG QRS DURATION: 72 MS
EKG QRS DURATION: 78 MS
EKG QTC CALCULATION (BAZETT): 467 MS
EKG QTC CALCULATION (BAZETT): 534 MS
EKG R AXIS: 82 DEGREES
EKG R AXIS: 85 DEGREES
EKG T AXIS: 74 DEGREES
EKG T AXIS: 77 DEGREES
EKG VENTRICULAR RATE: 145 BPM
EKG VENTRICULAR RATE: 94 BPM
EOSINOPHIL # BLD: 0 K/UL (ref 0–0.6)
EOSINOPHIL NFR BLD: 0 %
EOSINOPHIL URNS QL MICRO: NORMAL
ETHANOLAMINE SERPL-MCNC: 43 MG/DL (ref 0–0.08)
FENTANYL SCREEN, URINE: NORMAL
GFR SERPLBLD CREATININE-BSD FMLA CKD-EPI: 48 ML/MIN/{1.73_M2}
GFR SERPLBLD CREATININE-BSD FMLA CKD-EPI: >60 ML/MIN/{1.73_M2}
GLUCOSE SERPL-MCNC: 121 MG/DL (ref 70–99)
GLUCOSE SERPL-MCNC: 128 MG/DL (ref 70–99)
HCO3 BLDV-SCNC: 19.2 MMOL/L (ref 24–28)
HCT VFR BLD AUTO: 35.5 % (ref 40.5–52.5)
HGB BLD-MCNC: 12.2 G/DL (ref 13.5–17.5)
LACTATE BLDV-SCNC: 1.4 MMOL/L (ref 0.4–2)
LACTATE BLDV-SCNC: 3.7 MMOL/L (ref 0.4–2)
LIPASE SERPL-CCNC: 49 U/L (ref 13–60)
LIPASE SERPL-CCNC: 61 U/L (ref 13–60)
LYMPHOCYTES # BLD: 0.8 K/UL (ref 1–5.1)
LYMPHOCYTES NFR BLD: 14.7 %
MAGNESIUM SERPL-MCNC: 1.8 MG/DL (ref 1.8–2.4)
MCH RBC QN AUTO: 33.8 PG (ref 26–34)
MCHC RBC AUTO-ENTMCNC: 34.4 G/DL (ref 31–36)
MCV RBC AUTO: 98.1 FL (ref 80–100)
METHADONE UR QL SCN>300 NG/ML: NORMAL
METHGB MFR BLDV: 0.5 % (ref 0–1.5)
MONOCYTES # BLD: 0.5 K/UL (ref 0–1.3)
MONOCYTES NFR BLD: 9.9 %
NEUTROPHILS # BLD: 3.9 K/UL (ref 1.7–7.7)
NEUTROPHILS NFR BLD: 74.3 %
NT-PROBNP SERPL-MCNC: 89 PG/ML (ref 0–124)
OPIATES UR QL SCN>300 NG/ML: NORMAL
OSMOLALITY UR: 963 MOSM/KG (ref 390–1070)
OXYCODONE UR QL SCN: NORMAL
PCO2 BLDV: 38.1 MMHG (ref 41–51)
PCP UR QL SCN>25 NG/ML: NORMAL
PH BLDV: 7.31 [PH] (ref 7.35–7.45)
PH UR STRIP: 5.5 [PH]
PHOSPHATE SERPL-MCNC: 1.2 MG/DL (ref 2.5–4.9)
PLATELET # BLD AUTO: 216 K/UL (ref 135–450)
PMV BLD AUTO: 7 FL (ref 5–10.5)
PO2 BLDV: 44.1 MMHG (ref 25–40)
POTASSIUM SERPL-SCNC: 4 MMOL/L (ref 3.5–5.1)
POTASSIUM SERPL-SCNC: 4.1 MMOL/L (ref 3.5–5.1)
PROT SERPL-MCNC: 7.3 G/DL (ref 6.4–8.2)
PROT SERPL-MCNC: 9.5 G/DL (ref 6.4–8.2)
PROT UR-MCNC: 144 MG/DL
PROT/CREAT UR-RTO: 0.4 MG/DL
RBC # BLD AUTO: 3.62 M/UL (ref 4.2–5.9)
SAO2 % BLDV: 71 %
SODIUM SERPL-SCNC: 135 MMOL/L (ref 136–145)
SODIUM SERPL-SCNC: 137 MMOL/L (ref 136–145)
SODIUM UR-SCNC: 132 MMOL/L
TROPONIN, HIGH SENSITIVITY: 14 NG/L (ref 0–22)
TROPONIN, HIGH SENSITIVITY: 17 NG/L (ref 0–22)
WBC # BLD AUTO: 5.3 K/UL (ref 4–11)

## 2023-11-05 PROCEDURE — 2580000003 HC RX 258: Performed by: EMERGENCY MEDICINE

## 2023-11-05 PROCEDURE — 96374 THER/PROPH/DIAG INJ IV PUSH: CPT

## 2023-11-05 PROCEDURE — 84300 ASSAY OF URINE SODIUM: CPT

## 2023-11-05 PROCEDURE — 80048 BASIC METABOLIC PNL TOTAL CA: CPT

## 2023-11-05 PROCEDURE — 83935 ASSAY OF URINE OSMOLALITY: CPT

## 2023-11-05 PROCEDURE — 80307 DRUG TEST PRSMV CHEM ANLYZR: CPT

## 2023-11-05 PROCEDURE — 87205 SMEAR GRAM STAIN: CPT

## 2023-11-05 PROCEDURE — 82077 ASSAY SPEC XCP UR&BREATH IA: CPT

## 2023-11-05 PROCEDURE — 6360000002 HC RX W HCPCS: Performed by: INTERNAL MEDICINE

## 2023-11-05 PROCEDURE — 85025 COMPLETE CBC W/AUTO DIFF WBC: CPT

## 2023-11-05 PROCEDURE — 83735 ASSAY OF MAGNESIUM: CPT

## 2023-11-05 PROCEDURE — C9113 INJ PANTOPRAZOLE SODIUM, VIA: HCPCS | Performed by: INTERNAL MEDICINE

## 2023-11-05 PROCEDURE — 93005 ELECTROCARDIOGRAM TRACING: CPT | Performed by: EMERGENCY MEDICINE

## 2023-11-05 PROCEDURE — 99223 1ST HOSP IP/OBS HIGH 75: CPT | Performed by: INTERNAL MEDICINE

## 2023-11-05 PROCEDURE — 83605 ASSAY OF LACTIC ACID: CPT

## 2023-11-05 PROCEDURE — 82570 ASSAY OF URINE CREATININE: CPT

## 2023-11-05 PROCEDURE — 84484 ASSAY OF TROPONIN QUANT: CPT

## 2023-11-05 PROCEDURE — 6360000002 HC RX W HCPCS: Performed by: EMERGENCY MEDICINE

## 2023-11-05 PROCEDURE — 2580000003 HC RX 258: Performed by: INTERNAL MEDICINE

## 2023-11-05 PROCEDURE — 6370000000 HC RX 637 (ALT 250 FOR IP): Performed by: INTERNAL MEDICINE

## 2023-11-05 PROCEDURE — 84100 ASSAY OF PHOSPHORUS: CPT

## 2023-11-05 PROCEDURE — 82803 BLOOD GASES ANY COMBINATION: CPT

## 2023-11-05 PROCEDURE — 80076 HEPATIC FUNCTION PANEL: CPT

## 2023-11-05 PROCEDURE — 36415 COLL VENOUS BLD VENIPUNCTURE: CPT

## 2023-11-05 PROCEDURE — 6360000002 HC RX W HCPCS: Performed by: NURSE PRACTITIONER

## 2023-11-05 PROCEDURE — 2060000000 HC ICU INTERMEDIATE R&B

## 2023-11-05 PROCEDURE — 83880 ASSAY OF NATRIURETIC PEPTIDE: CPT

## 2023-11-05 PROCEDURE — 93005 ELECTROCARDIOGRAM TRACING: CPT | Performed by: STUDENT IN AN ORGANIZED HEALTH CARE EDUCATION/TRAINING PROGRAM

## 2023-11-05 PROCEDURE — 96375 TX/PRO/DX INJ NEW DRUG ADDON: CPT

## 2023-11-05 PROCEDURE — 99285 EMERGENCY DEPT VISIT HI MDM: CPT

## 2023-11-05 PROCEDURE — 82040 ASSAY OF SERUM ALBUMIN: CPT

## 2023-11-05 PROCEDURE — 84156 ASSAY OF PROTEIN URINE: CPT

## 2023-11-05 PROCEDURE — 83690 ASSAY OF LIPASE: CPT

## 2023-11-05 RX ORDER — ONDANSETRON 2 MG/ML
4 INJECTION INTRAMUSCULAR; INTRAVENOUS ONCE
Status: COMPLETED | OUTPATIENT
Start: 2023-11-05 | End: 2023-11-05

## 2023-11-05 RX ORDER — LORAZEPAM 2 MG/ML
1 INJECTION INTRAMUSCULAR ONCE
Status: COMPLETED | OUTPATIENT
Start: 2023-11-05 | End: 2023-11-05

## 2023-11-05 RX ORDER — SODIUM CHLORIDE, SODIUM LACTATE, POTASSIUM CHLORIDE, CALCIUM CHLORIDE 600; 310; 30; 20 MG/100ML; MG/100ML; MG/100ML; MG/100ML
INJECTION, SOLUTION INTRAVENOUS CONTINUOUS
Status: ACTIVE | OUTPATIENT
Start: 2023-11-05 | End: 2023-11-05

## 2023-11-05 RX ORDER — LORAZEPAM 2 MG/ML
1 INJECTION INTRAMUSCULAR
Status: DISCONTINUED | OUTPATIENT
Start: 2023-11-05 | End: 2023-11-06 | Stop reason: HOSPADM

## 2023-11-05 RX ORDER — PROCHLORPERAZINE EDISYLATE 5 MG/ML
10 INJECTION INTRAMUSCULAR; INTRAVENOUS EVERY 6 HOURS PRN
Status: DISCONTINUED | OUTPATIENT
Start: 2023-11-05 | End: 2023-11-06 | Stop reason: HOSPADM

## 2023-11-05 RX ORDER — LORAZEPAM 1 MG/1
1 TABLET ORAL
Status: DISCONTINUED | OUTPATIENT
Start: 2023-11-05 | End: 2023-11-06 | Stop reason: HOSPADM

## 2023-11-05 RX ORDER — ENOXAPARIN SODIUM 100 MG/ML
40 INJECTION SUBCUTANEOUS DAILY
Status: DISCONTINUED | OUTPATIENT
Start: 2023-11-05 | End: 2023-11-06 | Stop reason: HOSPADM

## 2023-11-05 RX ORDER — ACETAMINOPHEN 325 MG/1
650 TABLET ORAL EVERY 6 HOURS PRN
Status: DISCONTINUED | OUTPATIENT
Start: 2023-11-05 | End: 2023-11-06 | Stop reason: HOSPADM

## 2023-11-05 RX ORDER — ACETAMINOPHEN 650 MG/1
650 SUPPOSITORY RECTAL EVERY 6 HOURS PRN
Status: DISCONTINUED | OUTPATIENT
Start: 2023-11-05 | End: 2023-11-06 | Stop reason: HOSPADM

## 2023-11-05 RX ORDER — SODIUM CHLORIDE 0.9 % (FLUSH) 0.9 %
5-40 SYRINGE (ML) INJECTION EVERY 12 HOURS SCHEDULED
Status: DISCONTINUED | OUTPATIENT
Start: 2023-11-05 | End: 2023-11-06 | Stop reason: HOSPADM

## 2023-11-05 RX ORDER — SODIUM CHLORIDE 9 MG/ML
INJECTION, SOLUTION INTRAVENOUS PRN
Status: DISCONTINUED | OUTPATIENT
Start: 2023-11-05 | End: 2023-11-06 | Stop reason: HOSPADM

## 2023-11-05 RX ORDER — THIAMINE HYDROCHLORIDE 100 MG/ML
100 INJECTION, SOLUTION INTRAMUSCULAR; INTRAVENOUS DAILY
Status: DISCONTINUED | OUTPATIENT
Start: 2023-11-05 | End: 2023-11-05

## 2023-11-05 RX ORDER — PANTOPRAZOLE SODIUM 40 MG/10ML
40 INJECTION, POWDER, LYOPHILIZED, FOR SOLUTION INTRAVENOUS DAILY
Status: DISCONTINUED | OUTPATIENT
Start: 2023-11-05 | End: 2023-11-06 | Stop reason: HOSPADM

## 2023-11-05 RX ORDER — ONDANSETRON 2 MG/ML
4 INJECTION INTRAMUSCULAR; INTRAVENOUS EVERY 6 HOURS PRN
Status: DISCONTINUED | OUTPATIENT
Start: 2023-11-05 | End: 2023-11-06 | Stop reason: HOSPADM

## 2023-11-05 RX ORDER — LORAZEPAM 2 MG/ML
2 INJECTION INTRAMUSCULAR
Status: DISCONTINUED | OUTPATIENT
Start: 2023-11-05 | End: 2023-11-06 | Stop reason: HOSPADM

## 2023-11-05 RX ORDER — ONDANSETRON 4 MG/1
4 TABLET, ORALLY DISINTEGRATING ORAL EVERY 8 HOURS PRN
Status: DISCONTINUED | OUTPATIENT
Start: 2023-11-05 | End: 2023-11-06 | Stop reason: HOSPADM

## 2023-11-05 RX ORDER — LORAZEPAM 1 MG/1
4 TABLET ORAL
Status: DISCONTINUED | OUTPATIENT
Start: 2023-11-05 | End: 2023-11-06 | Stop reason: HOSPADM

## 2023-11-05 RX ORDER — LORAZEPAM 1 MG/1
3 TABLET ORAL
Status: DISCONTINUED | OUTPATIENT
Start: 2023-11-05 | End: 2023-11-06 | Stop reason: HOSPADM

## 2023-11-05 RX ORDER — KETOROLAC TROMETHAMINE 30 MG/ML
15 INJECTION, SOLUTION INTRAMUSCULAR; INTRAVENOUS ONCE
Status: COMPLETED | OUTPATIENT
Start: 2023-11-05 | End: 2023-11-05

## 2023-11-05 RX ORDER — 0.9 % SODIUM CHLORIDE 0.9 %
1000 INTRAVENOUS SOLUTION INTRAVENOUS ONCE
Status: COMPLETED | OUTPATIENT
Start: 2023-11-05 | End: 2023-11-05

## 2023-11-05 RX ORDER — LORAZEPAM 2 MG/ML
3 INJECTION INTRAMUSCULAR
Status: DISCONTINUED | OUTPATIENT
Start: 2023-11-05 | End: 2023-11-06 | Stop reason: HOSPADM

## 2023-11-05 RX ORDER — GAUZE BANDAGE 2" X 2"
100 BANDAGE TOPICAL DAILY
Status: DISCONTINUED | OUTPATIENT
Start: 2023-11-05 | End: 2023-11-05

## 2023-11-05 RX ORDER — SODIUM CHLORIDE 0.9 % (FLUSH) 0.9 %
5-40 SYRINGE (ML) INJECTION PRN
Status: DISCONTINUED | OUTPATIENT
Start: 2023-11-05 | End: 2023-11-06 | Stop reason: HOSPADM

## 2023-11-05 RX ORDER — LORAZEPAM 2 MG/ML
4 INJECTION INTRAMUSCULAR
Status: DISCONTINUED | OUTPATIENT
Start: 2023-11-05 | End: 2023-11-06 | Stop reason: HOSPADM

## 2023-11-05 RX ORDER — LORAZEPAM 1 MG/1
2 TABLET ORAL
Status: DISCONTINUED | OUTPATIENT
Start: 2023-11-05 | End: 2023-11-06 | Stop reason: HOSPADM

## 2023-11-05 RX ORDER — MAGNESIUM SULFATE IN WATER 40 MG/ML
2000 INJECTION, SOLUTION INTRAVENOUS ONCE
Status: COMPLETED | OUTPATIENT
Start: 2023-11-05 | End: 2023-11-05

## 2023-11-05 RX ORDER — VITAMIN B COMPLEX
1 CAPSULE ORAL DAILY
Status: DISCONTINUED | OUTPATIENT
Start: 2023-11-05 | End: 2023-11-06 | Stop reason: HOSPADM

## 2023-11-05 RX ADMIN — SODIUM CHLORIDE, PRESERVATIVE FREE 10 ML: 5 INJECTION INTRAVENOUS at 10:59

## 2023-11-05 RX ADMIN — SODIUM CHLORIDE 1000 ML: 9 INJECTION, SOLUTION INTRAVENOUS at 01:30

## 2023-11-05 RX ADMIN — SODIUM CHLORIDE 1000 ML: 9 INJECTION, SOLUTION INTRAVENOUS at 01:35

## 2023-11-05 RX ADMIN — ENOXAPARIN SODIUM 40 MG: 100 INJECTION SUBCUTANEOUS at 10:59

## 2023-11-05 RX ADMIN — ONDANSETRON 4 MG: 2 INJECTION INTRAMUSCULAR; INTRAVENOUS at 01:31

## 2023-11-05 RX ADMIN — PANTOPRAZOLE SODIUM 40 MG: 40 INJECTION, POWDER, FOR SOLUTION INTRAVENOUS at 10:59

## 2023-11-05 RX ADMIN — SODIUM CHLORIDE, POTASSIUM CHLORIDE, SODIUM LACTATE AND CALCIUM CHLORIDE: 600; 310; 30; 20 INJECTION, SOLUTION INTRAVENOUS at 04:21

## 2023-11-05 RX ADMIN — KETOROLAC TROMETHAMINE 15 MG: 30 INJECTION, SOLUTION INTRAMUSCULAR at 01:31

## 2023-11-05 RX ADMIN — ACETAMINOPHEN 650 MG: 325 TABLET ORAL at 15:32

## 2023-11-05 RX ADMIN — METOPROLOL TARTRATE 12.5 MG: 25 TABLET, FILM COATED ORAL at 10:59

## 2023-11-05 RX ADMIN — LORAZEPAM 1 MG: 2 INJECTION INTRAMUSCULAR; INTRAVENOUS at 01:31

## 2023-11-05 RX ADMIN — THIAMINE HYDROCHLORIDE 100 MG: 100 INJECTION, SOLUTION INTRAMUSCULAR; INTRAVENOUS at 10:59

## 2023-11-05 RX ADMIN — MAGNESIUM SULFATE HEPTAHYDRATE 2000 MG: 40 INJECTION, SOLUTION INTRAVENOUS at 01:53

## 2023-11-05 RX ADMIN — METOPROLOL TARTRATE 12.5 MG: 25 TABLET, FILM COATED ORAL at 20:59

## 2023-11-05 RX ADMIN — SODIUM CHLORIDE, PRESERVATIVE FREE 10 ML: 5 INJECTION INTRAVENOUS at 21:00

## 2023-11-05 RX ADMIN — PROCHLORPERAZINE EDISYLATE 10 MG: 5 INJECTION INTRAMUSCULAR; INTRAVENOUS at 04:53

## 2023-11-05 RX ADMIN — METOPROLOL TARTRATE 12.5 MG: 25 TABLET, FILM COATED ORAL at 04:54

## 2023-11-05 ASSESSMENT — ENCOUNTER SYMPTOMS
ABDOMINAL PAIN: 1
NAUSEA: 1
VOMITING: 1
DIARRHEA: 0
RHINORRHEA: 0
EYE DISCHARGE: 0
SHORTNESS OF BREATH: 1
FACIAL SWELLING: 0
EYE PAIN: 0
CHOKING: 0
CHEST TIGHTNESS: 0

## 2023-11-05 ASSESSMENT — PAIN DESCRIPTION - DESCRIPTORS: DESCRIPTORS: ACHING

## 2023-11-05 ASSESSMENT — PAIN DESCRIPTION - ORIENTATION: ORIENTATION: LEFT;ANTERIOR;UPPER

## 2023-11-05 ASSESSMENT — LIFESTYLE VARIABLES
HOW OFTEN DO YOU HAVE A DRINK CONTAINING ALCOHOL: 4 OR MORE TIMES A WEEK
HOW MANY STANDARD DRINKS CONTAINING ALCOHOL DO YOU HAVE ON A TYPICAL DAY: 5 OR 6

## 2023-11-05 ASSESSMENT — PAIN SCALES - GENERAL
PAINLEVEL_OUTOF10: 6
PAINLEVEL_OUTOF10: 2

## 2023-11-05 ASSESSMENT — PAIN DESCRIPTION - LOCATION: LOCATION: HEAD

## 2023-11-05 ASSESSMENT — PAIN - FUNCTIONAL ASSESSMENT: PAIN_FUNCTIONAL_ASSESSMENT: ACTIVITIES ARE NOT PREVENTED

## 2023-11-05 NOTE — PLAN OF CARE
[facility] Hospitalist brief note  Consult received. Case reviewed with ER physician  [id number] male alcoholic coming in with LIZZETTE and dehydration. Full note to follow.     Ramírez Samaniego MD    Thanks  Aislinn Noriega MD

## 2023-11-05 NOTE — PLAN OF CARE
Problem: Discharge Planning  Goal: Discharge to home or other facility with appropriate resources  Outcome: Progressing     Problem: Risk for Elopement  Goal: Patient will not exit the unit/facility without proper excort  11/5/2023 1841 by Sekou Chris RN  Outcome: Progressing  Flowsheets  Taken 11/5/2023 1522  Nursing Interventions for Elopement Risk:   Assist with personal care needs such as toileting, eating, dressing, as needed to reduce the risk of wandering   Make sure patient has all necessary personal care items   Place patient in room far away from exits and stairways   Reduce environmental triggers  Taken 11/5/2023 1203  Nursing Interventions for Elopement Risk:   Assist with personal care needs such as toileting, eating, dressing, as needed to reduce the risk of wandering   Make sure patient has all necessary personal care items   Place patient in room far away from exits and stairways   Reduce environmental triggers  11/5/2023 0829 by Rody Everett RN  Outcome: Not Progressing  Flowsheets  Taken 11/5/2023 0829  Nursing Interventions for Elopement Risk:   Assist with personal care needs such as toileting, eating, dressing, as needed to reduce the risk of wandering   Make sure patient has all necessary personal care items   Place patient in room far away from exits and stairways   Reduce environmental triggers  Taken 11/5/2023 0400  Nursing Interventions for Elopement Risk:   Communicate/escalate to charge nurse the risk of elopement   Collaborate with treatment team for drug withdrawal symptoms treatment   Assist with personal care needs such as toileting, eating, dressing, as needed to reduce the risk of wandering   Make sure patient has all necessary personal care items   Place patient in room far away from exits and stairways   Reduce environmental triggers  Note: Patient states that he might leave AMA once his nausea is resolved.  Patient educated on importance of completing the treatment

## 2023-11-05 NOTE — ED NOTES
ED TO INPATIENT SBAR HANDOFF    Patient Name: Luciana Gonsales   :  1980  41 y.o. MRN:  [id number]  Preferred Name    ED Room #:  A07/A07-07  Family/Caregiver Present no   Restraints no   Sitter no   Sepsis Risk Score Sepsis Risk Score: 1.8    Situation  Code Status: Full Code No additional code details. Allergies: Amlodipine, Banana, Carvedilol, and Tree nut  [macadamia nut oil]  Weight: Patient Vitals for the past 96 hrs (Last 3 readings):   Weight   23 0110 76 kg (167 lb 9.6 oz)     Arrived from: home  Chief Complaint:   Chief Complaint   Patient presents with    Nausea    Emesis     Patient presents to the ED with c/o nausea and vomiting for the last 3 days, as well as dizziness and light headed. States he drinks a fifth of alcohol daily, last drink was midnight tonight. Hospital Problem/Diagnosis:  Principal Problem:    LIZZETTE (acute kidney injury) ([address])  Active Problems:    Alcohol abuse    Tobacco use    Transaminitis  Resolved Problems:    * No resolved hospital problems. *    Imaging:   No orders to display     Abnormal labs:   Abnormal Labs Reviewed   CBC WITH AUTO DIFFERENTIAL - Abnormal; Notable for the following components:       Result Value    RBC 3.62 (*)     Hemoglobin 12.2 (*)     Hematocrit 35.5 (*)     RDW 17.1 (*)     Lymphocytes Absolute 0.8 (*)     All other components within normal limits   BASIC METABOLIC PANEL W/ REFLEX TO MG FOR LOW K - Abnormal; Notable for the following components:    Chloride 82 (*)     CO2 16 (*)     Anion Gap 39 (*)     Glucose 121 (*)     Creatinine 1.8 (*)     Est, Glom Filt Rate 48 (*)     All other components within normal limits   HEPATIC FUNCTION PANEL - Abnormal; Notable for the following components:     Total Protein 9.5 (*)     Albumin 5.3 (*)     Alkaline Phosphatase 204 (*)     ALT 62 (*)      (*)     Total Bilirubin 1.5 (*)     Bilirubin, Direct 0.7 (*)     All other components within normal limits   BLOOD GAS, VENOUS - Abnormal;

## 2023-11-05 NOTE — H&P
Last 24 Hours   No results found.       Electronically signed by Alesha Delgadillo MD on 11/5/2023 at 1:34 AM

## 2023-11-05 NOTE — PROGRESS NOTES
4 Eyes Skin Assessment     NAME:  Jayleen Elizondo  YOB: 1980  MEDICAL RECORD NUMBER:  [id number]    The patient is being assessed for  Admission    I agree that at least one RN has performed a thorough Head to Toe Skin Assessment on the patient. ALL assessment sites listed below have been assessed. Areas assessed by both nurses:    Head, Face, Ears, Shoulders, Back, Chest, Arms, Elbows, Hands, Sacrum. Buttock, Coccyx, Ischium, and Legs. Feet and Heels        Does the Patient have a Wound?  No noted wound(s)       Boo Prevention initiated by RN: No  Wound Care Orders initiated by RN: No    Pressure Injury (Stage 3,4, Unstageable, DTI, NWPT, and Complex wounds) if present, place Wound referral order by RN under : No    New Ostomies, if present place, Ostomy referral order under : No     Nurse 1 eSignature: Electronically signed by Kylee Woodruff RN on 11/5/23 at 8:43 AM EST    **SHARE this note so that the co-signing nurse can place an eSignature**    Nurse 2 eSignature: Electronically signed by Andie Ibrahim RN on 11/5/23 at 8:46 AM EST

## 2023-11-05 NOTE — PLAN OF CARE
Problem: Safety - Adult  Goal: Free from fall injury  Outcome: Progressing  Flowsheets (Taken 11/5/2023 0829)  Free From Fall Injury:   Instruct family/caregiver on patient safety   Based on caregiver fall risk screen, instruct family/caregiver to ask for assistance with transferring infant if caregiver noted to have fall risk factors  Note: Patient placed on fall precautions. Bed brake and alarm on, bed at the lowest position, call light within reach, gripper socks on. Patient encouraged to call light whenever needs assistance. Problem: Risk for Elopement  Goal: Patient will not exit the unit/facility without proper excort  Outcome: Not Progressing  Flowsheets  Taken 11/5/2023 0829  Nursing Interventions for Elopement Risk:   Assist with personal care needs such as toileting, eating, dressing, as needed to reduce the risk of wandering   Make sure patient has all necessary personal care items   Place patient in room far away from exits and stairways   Reduce environmental triggers  Taken 11/5/2023 0400  Nursing Interventions for Elopement Risk:   Communicate/escalate to charge nurse the risk of elopement   Collaborate with treatment team for drug withdrawal symptoms treatment   Assist with personal care needs such as toileting, eating, dressing, as needed to reduce the risk of wandering   Make sure patient has all necessary personal care items   Place patient in room far away from exits and stairways   Reduce environmental triggers  Note: Patient states that he might leave AMA once his nausea is resolved. Patient educated on importance of completing the treatment and the transition to rehab. Patient understands but does not affirm with it. Needs reinforcement.      Problem: Nutrition Deficit:  Goal: Optimize nutritional status  Outcome: Not Progressing  Flowsheets (Taken 11/5/2023 9532)  Nutrient intake appropriate for improving, restoring, or maintaining nutritional needs:   Assess nutritional status and

## 2023-11-05 NOTE — PROGRESS NOTES
Patient was a postmidnight admit  For full details please refer to earlier H&P    Patient was seen and examined at bedside  In brief 51-year-old male with history of alcohol use, nicotine dependence, HTN, anxiety presented with nausea vomiting abdominal discomfort, the ER was noted to be hypertensive, labs showed LIZZETTE and was admitted for further care  -AGMA/LIZZETTE- will repeat labs, elevated lactate noted  -consult NEphro  -We will check urine studies, renal ultrasound  -Monitor CIWA's  -We will reepat lipase and LFTs  -Continue with IV thiamine, Protonix, IV fluid    Electronically signed by Fab Jose MD on 11/5/2023 at 12:26 PM

## 2023-11-06 VITALS
HEIGHT: 69 IN | RESPIRATION RATE: 18 BRPM | OXYGEN SATURATION: 99 % | HEART RATE: 86 BPM | TEMPERATURE: 99.8 F | SYSTOLIC BLOOD PRESSURE: 158 MMHG | BODY MASS INDEX: 24.85 KG/M2 | WEIGHT: 167.77 LBS | DIASTOLIC BLOOD PRESSURE: 104 MMHG

## 2023-11-06 LAB
ALBUMIN SERPL-MCNC: 4.4 G/DL (ref 3.4–5)
ALBUMIN/GLOB SERPL: 1.4 {RATIO} (ref 1.1–2.2)
ALP SERPL-CCNC: 154 U/L (ref 40–129)
ALT SERPL-CCNC: 40 U/L (ref 10–40)
ANION GAP SERPL CALCULATED.3IONS-SCNC: 15 MMOL/L (ref 3–16)
AST SERPL-CCNC: 130 U/L (ref 15–37)
BASOPHILS # BLD: 0 K/UL (ref 0–0.2)
BASOPHILS NFR BLD: 0.4 %
BILIRUB SERPL-MCNC: 1 MG/DL (ref 0–1)
BUN SERPL-MCNC: 11 MG/DL (ref 7–20)
CALCIUM SERPL-MCNC: 9.6 MG/DL (ref 8.3–10.6)
CHLORIDE SERPL-SCNC: 92 MMOL/L (ref 99–110)
CO2 SERPL-SCNC: 29 MMOL/L (ref 21–32)
CREAT SERPL-MCNC: 1 MG/DL (ref 0.9–1.3)
DEPRECATED RDW RBC AUTO: 16.5 % (ref 12.4–15.4)
EOSINOPHIL # BLD: 0 K/UL (ref 0–0.6)
EOSINOPHIL NFR BLD: 0.7 %
GFR SERPLBLD CREATININE-BSD FMLA CKD-EPI: >60 ML/MIN/{1.73_M2}
GLUCOSE SERPL-MCNC: 137 MG/DL (ref 70–99)
HCT VFR BLD AUTO: 29.7 % (ref 40.5–52.5)
HGB BLD-MCNC: 10.1 G/DL (ref 13.5–17.5)
LYMPHOCYTES # BLD: 0.5 K/UL (ref 1–5.1)
LYMPHOCYTES NFR BLD: 17.9 %
MCH RBC QN AUTO: 34.1 PG (ref 26–34)
MCHC RBC AUTO-ENTMCNC: 34 G/DL (ref 31–36)
MCV RBC AUTO: 100.4 FL (ref 80–100)
MONOCYTES # BLD: 0.3 K/UL (ref 0–1.3)
MONOCYTES NFR BLD: 11 %
NEUTROPHILS # BLD: 2 K/UL (ref 1.7–7.7)
NEUTROPHILS NFR BLD: 70 %
PLATELET # BLD AUTO: 102 K/UL (ref 135–450)
PMV BLD AUTO: 7.4 FL (ref 5–10.5)
POTASSIUM SERPL-SCNC: 4.2 MMOL/L (ref 3.5–5.1)
PROT SERPL-MCNC: 7.5 G/DL (ref 6.4–8.2)
RBC # BLD AUTO: 2.96 M/UL (ref 4.2–5.9)
SODIUM SERPL-SCNC: 136 MMOL/L (ref 136–145)
WBC # BLD AUTO: 2.9 K/UL (ref 4–11)

## 2023-11-06 PROCEDURE — 36415 COLL VENOUS BLD VENIPUNCTURE: CPT

## 2023-11-06 PROCEDURE — 85025 COMPLETE CBC W/AUTO DIFF WBC: CPT

## 2023-11-06 PROCEDURE — 80053 COMPREHEN METABOLIC PANEL: CPT

## 2023-11-06 NOTE — DISCHARGE SUMMARY
Clear 02/05/2021 01:58 PM    SPECGRAV >=1.030 02/05/2021 01:58 PM    LEUKOCYTESUR Negative 02/05/2021 01:58 PM    UROBILINOGEN 0.2 02/05/2021 01:58 PM    BILIRUBINUR SMALL 02/05/2021 01:58 PM    BLOODU TRACE-INTACT 02/05/2021 01:58 PM    GLUCOSEU Negative 02/05/2021 01:58 PM    KETUA >=80 02/05/2021 01:58 PM     Urine Cultures:   Lab Results   Component Value Date/Time    LABURIN  02/05/2021 01:58 PM     <10,000 CFU/ml mixed skin/urogenital jean paul.  No further workup     Blood Cultures: No results found for: \"BC\"  No results found for: \"BLOODCULT2\"  Organism: No results found for: \"ORG\"    Time Spent Discharging patient 25 minutes    Electronically signed by Valeria Gibson MD on 11/6/2023 at 1:03 PM

## 2023-11-06 NOTE — PROGRESS NOTES
Pt refused AM assessment and requested to leave AMA. MD placed discharge orders. No new medications and all questions answered. PIV removed with no complications. Pt ambulated to front lobby of [facility]. MD aware. Telebox removed.

## 2023-11-06 NOTE — PROGRESS NOTES
Nephrology Progress Note                                                                                                                                                                                                                                                                                                                                                               Office : [phone]     Fax :[phone]    Patient's Name: Jessica Carolina  8:44 AM  11/6/2023    Reason for Consult: LIZZETTE   Requesting Physician:  Med Regalado MD  Chief Complaint:    Chief Complaint   Patient presents with    Nausea    Emesis     Patient presents to the ED with c/o nausea and vomiting for the last 3 days, as well as dizziness and light headed. States he drinks a fifth of alcohol daily, last drink was midnight tonight. Assessment/Plan     # N/V     # LIZZETTE    # Hypotension     # Anemia    # Acidosis     # Alc use     Plan:  - Cr improving s/p IVF  - Renal ultrasound ordered   - Ur studies reviewed   - No ACE/ARBS/Diuretics  - Replace vitamins B complex   - Labs in AM     Recommend to dose adjust all medications  based on renal functions  Maintain SBP> 90 mmHg   Daily weights   AVOID NSAIDs  Avoid Nephrotoxins  Monitor Intake/Output  Call if significant decrease in urine output       History of Present Ilness:    Jessica Carolina is a 41 y.o. male smoker, alcohol with pmh of hypertension and anxiety who presents with nausea, vomiting, abdominal pain for the past 2 days, unable to keep neither food nor liquids down. He denies any melena, hematochezia or hematemesis. He denies any diarrhea. He also denies any fevers, chills, headaches, shortness of breath or chest pain. He has been drinking 1/5 of liquor daily for the past 6 months. Interval hx:            Past Medical History:   Diagnosis Date    Alcohol abuse     Anxiety     Hypertension        History reviewed. No pertinent surgical history.     Family History

## 2023-11-06 NOTE — PLAN OF CARE
Problem: Discharge Planning  Goal: Discharge to home or other facility with appropriate resources  11/6/2023 0613 by Len Martinez RN  Outcome: Progressing     Problem: Risk for Elopement  Goal: Patient will not exit the unit/facility without proper excort  11/6/2023 0613 by Len Martinez RN  Outcome: Progressing    Problem: Safety - Adult  Goal: Free from fall injury  11/6/2023 0613 by Len Martinez RN  Outcome: Progressing     Problem: Nutrition Deficit:  Goal: Optimize nutritional status  11/6/2023 0613 by Len Martinez RN  Outcome: Progressing

## 2023-11-22 ENCOUNTER — HOSPITAL ENCOUNTER (EMERGENCY)
Age: 43
Discharge: LEFT BEFORE DISCHARGE | End: 2023-11-22

## 2023-11-22 VITALS
BODY MASS INDEX: 26.66 KG/M2 | HEART RATE: 90 BPM | OXYGEN SATURATION: 95 % | SYSTOLIC BLOOD PRESSURE: 156 MMHG | DIASTOLIC BLOOD PRESSURE: 105 MMHG | WEIGHT: 180 LBS | HEIGHT: 69 IN | TEMPERATURE: 98.6 F | RESPIRATION RATE: 16 BRPM

## 2023-11-22 ASSESSMENT — PAIN - FUNCTIONAL ASSESSMENT: PAIN_FUNCTIONAL_ASSESSMENT: NONE - DENIES PAIN

## 2023-11-24 ENCOUNTER — APPOINTMENT (OUTPATIENT)
Dept: GENERAL RADIOLOGY | Age: 43
End: 2023-11-24
Payer: MEDICAID

## 2023-11-24 ENCOUNTER — HOSPITAL ENCOUNTER (EMERGENCY)
Age: 43
Discharge: HOME | End: 2023-11-24
Attending: EMERGENCY MEDICINE
Payer: MEDICAID

## 2023-11-24 VITALS
BODY MASS INDEX: 25.6 KG/M2 | HEART RATE: 110 BPM | OXYGEN SATURATION: 97 % | HEIGHT: 69 IN | WEIGHT: 172.84 LBS | DIASTOLIC BLOOD PRESSURE: 104 MMHG | TEMPERATURE: 97.8 F | SYSTOLIC BLOOD PRESSURE: 161 MMHG | RESPIRATION RATE: 14 BRPM

## 2023-11-24 DIAGNOSIS — R11.2 NAUSEA AND VOMITING, UNSPECIFIED VOMITING TYPE: Primary | ICD-10-CM
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
## 2023-11-24 LAB
ALBUMIN SERPL-MCNC: 4.8 G/DL (ref 3.4–5)
ALBUMIN/GLOB SERPL: 1.3 {RATIO} (ref 1.1–2.2)
ALP SERPL-CCNC: 112 U/L (ref 40–129)
ALT SERPL-CCNC: 39 U/L (ref 10–40)
ANION GAP SERPL CALCULATED.3IONS-SCNC: 28 MMOL/L (ref 3–16)
AST SERPL-CCNC: 99 U/L (ref 15–37)
BASOPHILS # BLD: 0.1 K/UL (ref 0–0.2)
BASOPHILS NFR BLD: 3.5 %
BILIRUB SERPL-MCNC: 0.4 MG/DL (ref 0–1)
BUN SERPL-MCNC: 9 MG/DL (ref 7–20)
CALCIUM SERPL-MCNC: 9.2 MG/DL (ref 8.3–10.6)
CHLORIDE SERPL-SCNC: 94 MMOL/L (ref 99–110)
CO2 SERPL-SCNC: 21 MMOL/L (ref 21–32)
CREAT SERPL-MCNC: 1 MG/DL (ref 0.9–1.3)
DEPRECATED RDW RBC AUTO: 16.3 % (ref 12.4–15.4)
EKG ATRIAL RATE: 101 BPM
EKG DIAGNOSIS: NORMAL
EKG P AXIS: 54 DEGREES
EKG P-R INTERVAL: 164 MS
EKG Q-T INTERVAL: 378 MS
EKG QRS DURATION: 86 MS
EKG QTC CALCULATION (BAZETT): 490 MS
EKG R AXIS: 68 DEGREES
EKG T AXIS: 55 DEGREES
EKG VENTRICULAR RATE: 101 BPM
EOSINOPHIL # BLD: 0 K/UL (ref 0–0.6)
EOSINOPHIL NFR BLD: 0.1 %
GFR SERPLBLD CREATININE-BSD FMLA CKD-EPI: >60 ML/MIN/{1.73_M2}
GLUCOSE SERPL-MCNC: 66 MG/DL (ref 70–99)
HCT VFR BLD AUTO: 30.7 % (ref 40.5–52.5)
HGB BLD-MCNC: 10.2 G/DL (ref 13.5–17.5)
LIPASE SERPL-CCNC: 32 U/L (ref 13–60)
LYMPHOCYTES # BLD: 0.5 K/UL (ref 1–5.1)
LYMPHOCYTES NFR BLD: 14.3 %
MAGNESIUM SERPL-MCNC: 1.3 MG/DL (ref 1.8–2.4)
MCH RBC QN AUTO: 32.2 PG (ref 26–34)
MCHC RBC AUTO-ENTMCNC: 33.1 G/DL (ref 31–36)
MCV RBC AUTO: 97.3 FL (ref 80–100)
MONOCYTES # BLD: 0.1 K/UL (ref 0–1.3)
MONOCYTES NFR BLD: 3.9 %
NEUTROPHILS # BLD: 2.9 K/UL (ref 1.7–7.7)
NEUTROPHILS NFR BLD: 78.2 %
PLATELET # BLD AUTO: 184 K/UL (ref 135–450)
PMV BLD AUTO: 6.8 FL (ref 5–10.5)
POTASSIUM SERPL-SCNC: 3.5 MMOL/L (ref 3.5–5.1)
PROT SERPL-MCNC: 8.4 G/DL (ref 6.4–8.2)
RBC # BLD AUTO: 3.16 M/UL (ref 4.2–5.9)
SODIUM SERPL-SCNC: 143 MMOL/L (ref 136–145)
TROPONIN, HIGH SENSITIVITY: 16 NG/L (ref 0–22)
TROPONIN, HIGH SENSITIVITY: 16 NG/L (ref 0–22)
WBC # BLD AUTO: 3.7 K/UL (ref 4–11)

## 2023-11-24 PROCEDURE — 96372 THER/PROPH/DIAG INJ SC/IM: CPT

## 2023-11-24 PROCEDURE — 96361 HYDRATE IV INFUSION ADD-ON: CPT

## 2023-11-24 PROCEDURE — 90675 RABIES VACCINE IM: CPT

## 2023-11-24 PROCEDURE — 93005 ELECTROCARDIOGRAM TRACING: CPT

## 2023-11-24 PROCEDURE — 6360000002 HC RX W HCPCS

## 2023-11-24 PROCEDURE — 96374 THER/PROPH/DIAG INJ IV PUSH: CPT

## 2023-11-24 PROCEDURE — 85025 COMPLETE CBC W/AUTO DIFF WBC: CPT

## 2023-11-24 PROCEDURE — 90471 IMMUNIZATION ADMIN: CPT

## 2023-11-24 PROCEDURE — 83690 ASSAY OF LIPASE: CPT

## 2023-11-24 PROCEDURE — 83735 ASSAY OF MAGNESIUM: CPT

## 2023-11-24 PROCEDURE — 99285 EMERGENCY DEPT VISIT HI MDM: CPT

## 2023-11-24 PROCEDURE — 73552 X-RAY EXAM OF FEMUR 2/>: CPT

## 2023-11-24 PROCEDURE — 2580000003 HC RX 258

## 2023-11-24 PROCEDURE — 84484 ASSAY OF TROPONIN QUANT: CPT

## 2023-11-24 PROCEDURE — 6370000000 HC RX 637 (ALT 250 FOR IP)

## 2023-11-24 PROCEDURE — 90375 RABIES IG IM/SC: CPT

## 2023-11-24 PROCEDURE — 80053 COMPREHEN METABOLIC PANEL: CPT

## 2023-11-24 RX ORDER — LANOLIN ALCOHOL/MO/W.PET/CERES
400 CREAM (GRAM) TOPICAL ONCE
Status: COMPLETED | OUTPATIENT
Start: 2023-11-24 | End: 2023-11-24

## 2023-11-24 RX ORDER — GAUZE BANDAGE 2" X 2"
100 BANDAGE TOPICAL ONCE
Status: COMPLETED | OUTPATIENT
Start: 2023-11-24 | End: 2023-11-24

## 2023-11-24 RX ORDER — AMOXICILLIN AND CLAVULANATE POTASSIUM 875; 125 MG/1; MG/1
1 TABLET, FILM COATED ORAL 2 TIMES DAILY
Qty: 14 TABLET | Refills: 0 | Status: SHIPPED | OUTPATIENT
Start: 2023-11-24 | End: 2023-12-01

## 2023-11-24 RX ORDER — ONDANSETRON 2 MG/ML
8 INJECTION INTRAMUSCULAR; INTRAVENOUS ONCE
Status: COMPLETED | OUTPATIENT
Start: 2023-11-24 | End: 2023-11-24

## 2023-11-24 RX ORDER — SODIUM CHLORIDE, SODIUM LACTATE, POTASSIUM CHLORIDE, AND CALCIUM CHLORIDE .6; .31; .03; .02 G/100ML; G/100ML; G/100ML; G/100ML
1000 INJECTION, SOLUTION INTRAVENOUS ONCE
Status: COMPLETED | OUTPATIENT
Start: 2023-11-24 | End: 2023-11-24

## 2023-11-24 RX ORDER — SODIUM CHLORIDE, SODIUM LACTATE, POTASSIUM CHLORIDE, CALCIUM CHLORIDE 600; 310; 30; 20 MG/100ML; MG/100ML; MG/100ML; MG/100ML
1000 INJECTION, SOLUTION INTRAVENOUS CONTINUOUS
Status: DISCONTINUED | OUTPATIENT
Start: 2023-11-24 | End: 2023-11-24

## 2023-11-24 RX ORDER — ONDANSETRON 4 MG/1
8 TABLET, ORALLY DISINTEGRATING ORAL 3 TIMES DAILY PRN
Qty: 21 TABLET | Refills: 0 | Status: SHIPPED | OUTPATIENT
Start: 2023-11-24

## 2023-11-24 RX ADMIN — RABIES IMMUNE GLOBULIN (HUMAN) 1575 UNITS: 300 INJECTION, SOLUTION INFILTRATION; INTRAMUSCULAR at 13:43

## 2023-11-24 RX ADMIN — SODIUM CHLORIDE, POTASSIUM CHLORIDE, SODIUM LACTATE AND CALCIUM CHLORIDE 1000 ML: 600; 310; 30; 20 INJECTION, SOLUTION INTRAVENOUS at 11:18

## 2023-11-24 RX ADMIN — ONDANSETRON 8 MG: 2 INJECTION INTRAMUSCULAR; INTRAVENOUS at 11:15

## 2023-11-24 RX ADMIN — RABIES VACCINE 1 ML: KIT at 13:40

## 2023-11-24 RX ADMIN — Medication 400 MG: at 13:49

## 2023-11-24 RX ADMIN — Medication 100 MG: at 13:49

## 2023-11-24 ASSESSMENT — PAIN - FUNCTIONAL ASSESSMENT: PAIN_FUNCTIONAL_ASSESSMENT: NONE - DENIES PAIN

## 2023-11-24 NOTE — DISCHARGE INSTRUCTIONS
We saw you in the emergency department for a dog bite and nausea/vomiting. For your dog bite, please take the full course of antibiotics that we have sent you. I suspect that your nausea and vomiting is secondary to alcohol withdrawal.  In order to avoid the dangerous side effects of alcohol withdrawal such as seizure and coma, you either need to come into the hospital for medication or continue drinking alcohol. You were treated with IV fluids, nausea medicine called Zofran, and electrolyte replacement of thiamine and magnesium. You need to take a pill called Augmentin when you go home, two times a day for 7 days    You should return to the emergency department if your symptoms worsen or do not resolve. In addition, return if:  - You have a fever (greater than 101 degrees)  - You have any increasing swelling, redness, warmth, drainage wound  - You have chest pain, shortness of breath, abdominal pain, or uncontrollable vomiting  - You are unable to eat or drink  - You pass out  - You have difficulty moving your arms or legs   - You have difficulty speaking or slurred speech  - Or you have any concern that you feel needs acute physician evaluation.

## 2023-11-24 NOTE — ED TRIAGE NOTES
Patient also endorsing during triage that he drinks 1/5 of alcohol a day and last drink was around 0300. Patient stated that he feels dehydrated and as if withdraw symptoms are there.

## 2023-11-24 NOTE — ED PROVIDER NOTES
ED Attending Attestation Note     Date of evaluation: 11/24/2023    This patient was seen by the resident. I have seen and examined the patient, agree with the workup, evaluation, management and diagnosis. The care plan has been discussed. I have reviewed the ECG and concur with the resident's interpretation. I was present for any procedures performed in the resident's  note and have made edits to the note where appropriate. My assessment reveals 41 y.o. male with history of alcohol use disorder presenting to the emergency department today for dog bite and vomiting in the setting of alcohol use. Here he is in no distress is mildly tachycardic and hypertensive but does not have significant tremulousness nor any evidence of severe alcohol withdrawal such as  DTs or hallucinations. He has a healing bite to the medial right thigh. We did discuss rabies immunoglobulin and vaccination to which she was initially interested but ultimately declined. He does have an anion gap likely consistent with alcoholic ketoacidosis but is able to tolerate p.o. intake here. He does not desire sobriety at this time and wishes to return to drinking alcohol later today. He is not interested in medications assisting with sobriety or detox. His tetanus immunization is up-to-date. We will place him on prophylactic antibiotics. He was given IV fluids and electrolytes were repleted. He was discharged in stable condition with written and verbal instructions for which to return to the ED.          Sandra Goldman MD  11/24/23 1575

## 2023-12-07 ENCOUNTER — HOSPITAL ENCOUNTER (EMERGENCY)
Age: 43
Discharge: HOME | End: 2023-12-07
Payer: MEDICAID

## 2023-12-07 VITALS
TEMPERATURE: 98.6 F | OXYGEN SATURATION: 99 % | DIASTOLIC BLOOD PRESSURE: 108 MMHG | BODY MASS INDEX: 25.52 KG/M2 | HEIGHT: 69 IN | RESPIRATION RATE: 16 BRPM | HEART RATE: 77 BPM | SYSTOLIC BLOOD PRESSURE: 160 MMHG

## 2023-12-07 DIAGNOSIS — Z23 NEED FOR PROPHYLACTIC VACCINATION AND INOCULATION AGAINST RABIES: Primary | ICD-10-CM

## 2023-12-07 PROCEDURE — 6360000002 HC RX W HCPCS: Performed by: NURSE PRACTITIONER

## 2023-12-07 PROCEDURE — 90471 IMMUNIZATION ADMIN: CPT | Performed by: NURSE PRACTITIONER

## 2023-12-07 PROCEDURE — 90675 RABIES VACCINE IM: CPT | Performed by: NURSE PRACTITIONER

## 2023-12-07 PROCEDURE — 99284 EMERGENCY DEPT VISIT MOD MDM: CPT

## 2023-12-07 RX ADMIN — RABIES VACCINE 1 ML: KIT at 13:06

## 2023-12-07 ASSESSMENT — PAIN - FUNCTIONAL ASSESSMENT: PAIN_FUNCTIONAL_ASSESSMENT: NONE - DENIES PAIN

## 2023-12-07 NOTE — ED NOTES
Discharge instructions provided to patient by RN at bedside. No further concerns addressed at this time.       Taylor Rodriguez RN  12/07/23 1464

## 2023-12-07 NOTE — DISCHARGE INSTRUCTIONS
- please follow up with the infusion center for completion of rabies immunizations  - you will need another vaccination on 12/11 and on 12/18

## 2024-01-29 ENCOUNTER — HOSPITAL ENCOUNTER (EMERGENCY)
Age: 44
Discharge: LEFT BEFORE DISCHARGE | End: 2024-01-29
Attending: EMERGENCY MEDICINE
Payer: MEDICAID

## 2024-01-29 VITALS
HEART RATE: 107 BPM | DIASTOLIC BLOOD PRESSURE: 104 MMHG | BODY MASS INDEX: 23.84 KG/M2 | RESPIRATION RATE: 13 BRPM | HEIGHT: 69 IN | SYSTOLIC BLOOD PRESSURE: 140 MMHG | TEMPERATURE: 98.5 F | WEIGHT: 160.94 LBS | OXYGEN SATURATION: 100 %

## 2024-01-29 DIAGNOSIS — F10.939 ALCOHOL WITHDRAWAL SYNDROME WITH COMPLICATION (HCC): ICD-10-CM

## 2024-01-29 DIAGNOSIS — N17.9 AKI (ACUTE KIDNEY INJURY) (HCC): Primary | ICD-10-CM

## 2024-01-29 LAB
ALBUMIN SERPL-MCNC: 5 G/DL (ref 3.4–5)
ALBUMIN/GLOB SERPL: 1.3 {RATIO} (ref 1.1–2.2)
ALP SERPL-CCNC: 215 U/L (ref 40–129)
ALT SERPL-CCNC: 33 U/L (ref 10–40)
ANION GAP SERPL CALCULATED.3IONS-SCNC: 31 MMOL/L (ref 3–16)
AST SERPL-CCNC: 120 U/L (ref 15–37)
BASE EXCESS BLDV CALC-SCNC: 1.9 MMOL/L (ref -2–3)
BASOPHILS # BLD: 0 K/UL (ref 0–0.2)
BASOPHILS NFR BLD: 0.2 %
BILIRUB SERPL-MCNC: 1.8 MG/DL (ref 0–1)
BUN SERPL-MCNC: 23 MG/DL (ref 7–20)
CALCIUM SERPL-MCNC: 10.4 MG/DL (ref 8.3–10.6)
CHLORIDE SERPL-SCNC: 83 MMOL/L (ref 99–110)
CO2 BLDV-SCNC: 29 MMOL/L
CO2 SERPL-SCNC: 23 MMOL/L (ref 21–32)
COHGB MFR BLDV: 2.2 % (ref 0–1.5)
CREAT SERPL-MCNC: 2.9 MG/DL (ref 0.9–1.3)
DEPRECATED RDW RBC AUTO: 20.5 % (ref 12.4–15.4)
DO-HGB MFR BLDV: 51.6 %
EOSINOPHIL # BLD: 0 K/UL (ref 0–0.6)
EOSINOPHIL NFR BLD: 0 %
ETHANOLAMINE SERPL-MCNC: NORMAL MG/DL (ref 0–0.08)
GFR SERPLBLD CREATININE-BSD FMLA CKD-EPI: 27 ML/MIN/{1.73_M2}
GLUCOSE SERPL-MCNC: 133 MG/DL (ref 70–99)
HCO3 BLDV-SCNC: 27.1 MMOL/L (ref 24–28)
HCT VFR BLD AUTO: 34.9 % (ref 40.5–52.5)
HGB BLD-MCNC: 11.2 G/DL (ref 13.5–17.5)
LACTATE BLDV-SCNC: 1.9 MMOL/L (ref 0.4–1.9)
LACTATE BLDV-SCNC: 3.4 MMOL/L (ref 0.4–1.9)
LIPASE SERPL-CCNC: 69 U/L (ref 13–60)
LYMPHOCYTES # BLD: 0.4 K/UL (ref 1–5.1)
LYMPHOCYTES NFR BLD: 5.8 %
MCH RBC QN AUTO: 32.2 PG (ref 26–34)
MCHC RBC AUTO-ENTMCNC: 32.2 G/DL (ref 31–36)
MCV RBC AUTO: 99.8 FL (ref 80–100)
METHGB MFR BLDV: 1 % (ref 0–1.5)
MONOCYTES # BLD: 0.7 K/UL (ref 0–1.3)
MONOCYTES NFR BLD: 10 %
NEUTROPHILS # BLD: 6 K/UL (ref 1.7–7.7)
NEUTROPHILS NFR BLD: 84 %
PCO2 BLDV: 44.1 MMHG (ref 41–51)
PH BLDV: 7.4 [PH] (ref 7.35–7.45)
PLATELET # BLD AUTO: 251 K/UL (ref 135–450)
PMV BLD AUTO: 7.3 FL (ref 5–10.5)
PO2 BLDV: <30 MMHG (ref 25–40)
POTASSIUM SERPL-SCNC: 4.4 MMOL/L (ref 3.5–5.1)
PROT SERPL-MCNC: 9 G/DL (ref 6.4–8.2)
RBC # BLD AUTO: 3.49 M/UL (ref 4.2–5.9)
SAO2 % BLDV: 47 %
SODIUM SERPL-SCNC: 137 MMOL/L (ref 136–145)
TROPONIN, HIGH SENSITIVITY: 15 NG/L (ref 0–22)
WBC # BLD AUTO: 7.1 K/UL (ref 4–11)

## 2024-01-29 PROCEDURE — 6370000000 HC RX 637 (ALT 250 FOR IP): Performed by: EMERGENCY MEDICINE

## 2024-01-29 PROCEDURE — 82803 BLOOD GASES ANY COMBINATION: CPT

## 2024-01-29 PROCEDURE — 96361 HYDRATE IV INFUSION ADD-ON: CPT

## 2024-01-29 PROCEDURE — 84484 ASSAY OF TROPONIN QUANT: CPT

## 2024-01-29 PROCEDURE — 96375 TX/PRO/DX INJ NEW DRUG ADDON: CPT

## 2024-01-29 PROCEDURE — 36415 COLL VENOUS BLD VENIPUNCTURE: CPT

## 2024-01-29 PROCEDURE — 83605 ASSAY OF LACTIC ACID: CPT

## 2024-01-29 PROCEDURE — 96374 THER/PROPH/DIAG INJ IV PUSH: CPT

## 2024-01-29 PROCEDURE — 83690 ASSAY OF LIPASE: CPT

## 2024-01-29 PROCEDURE — 80053 COMPREHEN METABOLIC PANEL: CPT

## 2024-01-29 PROCEDURE — 99284 EMERGENCY DEPT VISIT MOD MDM: CPT

## 2024-01-29 PROCEDURE — 85025 COMPLETE CBC W/AUTO DIFF WBC: CPT

## 2024-01-29 PROCEDURE — 6360000002 HC RX W HCPCS: Performed by: EMERGENCY MEDICINE

## 2024-01-29 PROCEDURE — 2580000003 HC RX 258: Performed by: EMERGENCY MEDICINE

## 2024-01-29 PROCEDURE — 82077 ASSAY SPEC XCP UR&BREATH IA: CPT

## 2024-01-29 RX ORDER — ONDANSETRON 4 MG/1
4 TABLET, ORALLY DISINTEGRATING ORAL 3 TIMES DAILY PRN
Qty: 21 TABLET | Refills: 0 | Status: SHIPPED | OUTPATIENT
Start: 2024-01-29

## 2024-01-29 RX ORDER — THIAMINE HYDROCHLORIDE 100 MG/ML
100 INJECTION, SOLUTION INTRAMUSCULAR; INTRAVENOUS ONCE
Status: COMPLETED | OUTPATIENT
Start: 2024-01-29 | End: 2024-01-29

## 2024-01-29 RX ORDER — M-VIT,TX,IRON,MINS/CALC/FOLIC 27MG-0.4MG
1 TABLET ORAL ONCE
Status: COMPLETED | OUTPATIENT
Start: 2024-01-29 | End: 2024-01-29

## 2024-01-29 RX ORDER — ONDANSETRON 2 MG/ML
8 INJECTION INTRAMUSCULAR; INTRAVENOUS ONCE
Status: COMPLETED | OUTPATIENT
Start: 2024-01-29 | End: 2024-01-29

## 2024-01-29 RX ORDER — SODIUM CHLORIDE, SODIUM LACTATE, POTASSIUM CHLORIDE, AND CALCIUM CHLORIDE .6; .31; .03; .02 G/100ML; G/100ML; G/100ML; G/100ML
1000 INJECTION, SOLUTION INTRAVENOUS ONCE
Status: COMPLETED | OUTPATIENT
Start: 2024-01-29 | End: 2024-01-29

## 2024-01-29 RX ORDER — LORAZEPAM 2 MG/ML
2 INJECTION INTRAMUSCULAR ONCE
Status: COMPLETED | OUTPATIENT
Start: 2024-01-29 | End: 2024-01-29

## 2024-01-29 RX ADMIN — THIAMINE HYDROCHLORIDE 100 MG: 100 INJECTION, SOLUTION INTRAMUSCULAR; INTRAVENOUS at 14:28

## 2024-01-29 RX ADMIN — SODIUM CHLORIDE, SODIUM LACTATE, POTASSIUM CHLORIDE, AND CALCIUM CHLORIDE 1000 ML: .6; .31; .03; .02 INJECTION, SOLUTION INTRAVENOUS at 13:28

## 2024-01-29 RX ADMIN — SODIUM CHLORIDE, POTASSIUM CHLORIDE, SODIUM LACTATE AND CALCIUM CHLORIDE 1000 ML: 600; 310; 30; 20 INJECTION, SOLUTION INTRAVENOUS at 12:17

## 2024-01-29 RX ADMIN — LORAZEPAM 2 MG: 2 INJECTION INTRAMUSCULAR; INTRAVENOUS at 12:17

## 2024-01-29 RX ADMIN — MULTIPLE VITAMINS W/ MINERALS TAB 1 TABLET: TAB at 14:28

## 2024-01-29 RX ADMIN — ONDANSETRON 8 MG: 2 INJECTION INTRAMUSCULAR; INTRAVENOUS at 12:17

## 2024-01-29 ASSESSMENT — PAIN - FUNCTIONAL ASSESSMENT: PAIN_FUNCTIONAL_ASSESSMENT: NONE - DENIES PAIN

## 2024-01-29 NOTE — ED PROVIDER NOTES
[id number]  [phone]            DISCHARGE MEDICATIONS:  New Prescriptions    ONDANSETRON (ZOFRAN-ODT) 4 MG DISINTEGRATING TABLET    Take 1 tablet by mouth 3 times daily as needed for Nausea or Vomiting       DISPOSITION [place] 01/29/2024 01:36:08 PM        Diagnostic Results and Other Data       RADIOLOGY:  No orders to display       LABS:   Results for orders placed or performed during the hospital encounter of 01/29/24   CBC with Auto Differential   Result Value Ref Range    WBC 7.1 4.0 - 11.0 K/uL    RBC 3.49 (L) 4.20 - 5.90 M/uL    Hemoglobin 11.2 (L) 13.5 - 17.5 g/dL    Hematocrit 34.9 (L) 40.5 - 52.5 %    MCV 99.8 80.0 - 100.0 fL    MCH 32.2 26.0 - 34.0 pg    MCHC 32.2 31.0 - 36.0 g/dL    RDW 20.5 (H) 12.4 - 15.4 %    Platelets 251 135 - 450 K/uL    MPV 7.3 5.0 - 10.5 fL    Neutrophils % 84.0 %    Lymphocytes % 5.8 %    Monocytes % 10.0 %    Eosinophils % 0.0 %    Basophils % 0.2 %    Neutrophils Absolute 6.0 1.7 - 7.7 K/uL    Lymphocytes Absolute 0.4 (L) 1.0 - 5.1 K/uL    Monocytes Absolute 0.7 0.0 - 1.3 K/uL    Eosinophils Absolute 0.0 0.0 - 0.6 K/uL    Basophils Absolute 0.0 0.0 - 0.2 K/uL   CMP w/ Reflex to MG   Result Value Ref Range    Sodium 137 136 - 145 mmol/L    Potassium reflex Magnesium 4.4 3.5 - 5.1 mmol/L    Chloride 83 (L) 99 - 110 mmol/L    CO2 23 21 - 32 mmol/L    Anion Gap 31 (H) 3 - 16    Glucose 133 (H) 70 - 99 mg/dL    BUN 23 (H) 7 - 20 mg/dL    Creatinine 2.9 (H) 0.9 - 1.3 mg/dL    Est, Glom Filt Rate 27 (A) >60    Calcium 10.4 8.3 - 10.6 mg/dL    Total Protein 9.0 (H) 6.4 - 8.2 g/dL    Albumin 5.0 3.4 - 5.0 g/dL    Albumin/Globulin Ratio 1.3 1.1 - 2.2    Total Bilirubin 1.8 (H) 0.0 - 1.0 mg/dL    Alkaline Phosphatase 215 (H) 40 - 129 U/L    ALT 33 10 - 40 U/L     (H) 15 - 37 U/L   Lipase   Result Value Ref Range    Lipase 69.0 (H) 13.0 - 60.0 U/L   Lactate, Sepsis   Result Value Ref Range    Lactic Acid, Sepsis 3.4 (H) 0.4 - 1.9 mmol/L   Blood Gas, Venous   Result Value Ref Range

## 2024-01-29 NOTE — DISCHARGE INSTRUCTIONS
If you change developing admitted to hospital, return to the emergency department.  We would recommend following up with kidney doctor nothing else and coming back you change her mind about being admitted or want further workup.  Take the Zofran as needed for nausea.

## 2024-02-09 LAB
EKG ATRIAL RATE: 99 BPM
EKG DIAGNOSIS: NORMAL
EKG P AXIS: 60 DEGREES
EKG P-R INTERVAL: 152 MS
EKG Q-T INTERVAL: 384 MS
EKG QRS DURATION: 80 MS
EKG QTC CALCULATION (BAZETT): 492 MS
EKG R AXIS: 74 DEGREES
EKG T AXIS: 76 DEGREES
EKG VENTRICULAR RATE: 99 BPM

## 2024-03-24 ENCOUNTER — HOSPITAL ENCOUNTER (EMERGENCY)
Age: 44
Discharge: HOME | End: 2024-03-24
Attending: STUDENT IN AN ORGANIZED HEALTH CARE EDUCATION/TRAINING PROGRAM
Payer: MEDICAID

## 2024-03-24 VITALS
HEART RATE: 105 BPM | DIASTOLIC BLOOD PRESSURE: 111 MMHG | WEIGHT: 175.7 LBS | BODY MASS INDEX: 26.02 KG/M2 | OXYGEN SATURATION: 97 % | HEIGHT: 69 IN | SYSTOLIC BLOOD PRESSURE: 171 MMHG | RESPIRATION RATE: 12 BRPM | TEMPERATURE: 98.8 F

## 2024-03-24 DIAGNOSIS — F10.90 ALCOHOL USE DISORDER: ICD-10-CM

## 2024-03-24 DIAGNOSIS — R11.2 NAUSEA AND VOMITING, UNSPECIFIED VOMITING TYPE: Primary | ICD-10-CM

## 2024-03-24 LAB
ALBUMIN SERPL-MCNC: 4.6 G/DL (ref 3.4–5)
ALBUMIN/GLOB SERPL: 1.1 {RATIO} (ref 1.1–2.2)
ALP SERPL-CCNC: 164 U/L (ref 40–129)
ALT SERPL-CCNC: 53 U/L (ref 10–40)
ANION GAP SERPL CALCULATED.3IONS-SCNC: 25 MMOL/L (ref 3–16)
AST SERPL-CCNC: 178 U/L (ref 15–37)
BASOPHILS # BLD: 0.1 K/UL (ref 0–0.2)
BASOPHILS NFR BLD: 1.4 %
BILIRUB SERPL-MCNC: 1.2 MG/DL (ref 0–1)
BUN SERPL-MCNC: 9 MG/DL (ref 7–20)
CALCIUM SERPL-MCNC: 9.4 MG/DL (ref 8.3–10.6)
CHLORIDE SERPL-SCNC: 89 MMOL/L (ref 99–110)
CO2 SERPL-SCNC: 22 MMOL/L (ref 21–32)
CREAT SERPL-MCNC: 1.1 MG/DL (ref 0.9–1.3)
DEPRECATED RDW RBC AUTO: 18.5 % (ref 12.4–15.4)
EOSINOPHIL # BLD: 0.1 K/UL (ref 0–0.6)
EOSINOPHIL NFR BLD: 2.1 %
ETHANOLAMINE SERPL-MCNC: 160 MG/DL (ref 0–0.08)
GFR SERPLBLD CREATININE-BSD FMLA CKD-EPI: >60 ML/MIN/{1.73_M2}
GLUCOSE SERPL-MCNC: 71 MG/DL (ref 70–99)
HCT VFR BLD AUTO: 34.6 % (ref 40.5–52.5)
HGB BLD-MCNC: 11.5 G/DL (ref 13.5–17.5)
INR PPP: 1.03 (ref 0.84–1.16)
LYMPHOCYTES # BLD: 1.4 K/UL (ref 1–5.1)
LYMPHOCYTES NFR BLD: 35.2 %
MAGNESIUM SERPL-MCNC: 1.7 MG/DL (ref 1.8–2.4)
MCH RBC QN AUTO: 32.8 PG (ref 26–34)
MCHC RBC AUTO-ENTMCNC: 33.2 G/DL (ref 31–36)
MCV RBC AUTO: 98.8 FL (ref 80–100)
MONOCYTES # BLD: 0.3 K/UL (ref 0–1.3)
MONOCYTES NFR BLD: 8.3 %
NEUTROPHILS # BLD: 2.1 K/UL (ref 1.7–7.7)
NEUTROPHILS NFR BLD: 53 %
PLATELET # BLD AUTO: 172 K/UL (ref 135–450)
PMV BLD AUTO: 7.2 FL (ref 5–10.5)
POTASSIUM SERPL-SCNC: 4.1 MMOL/L (ref 3.5–5.1)
PROT SERPL-MCNC: 8.7 G/DL (ref 6.4–8.2)
PROTHROMBIN TIME: 13.6 SEC (ref 11.5–14.8)
RBC # BLD AUTO: 3.5 M/UL (ref 4.2–5.9)
SODIUM SERPL-SCNC: 136 MMOL/L (ref 136–145)
WBC # BLD AUTO: 3.9 K/UL (ref 4–11)

## 2024-03-24 PROCEDURE — 99284 EMERGENCY DEPT VISIT MOD MDM: CPT

## 2024-03-24 PROCEDURE — 36415 COLL VENOUS BLD VENIPUNCTURE: CPT

## 2024-03-24 PROCEDURE — 2500000003 HC RX 250 WO HCPCS: Performed by: STUDENT IN AN ORGANIZED HEALTH CARE EDUCATION/TRAINING PROGRAM

## 2024-03-24 PROCEDURE — 6360000002 HC RX W HCPCS: Performed by: STUDENT IN AN ORGANIZED HEALTH CARE EDUCATION/TRAINING PROGRAM

## 2024-03-24 PROCEDURE — 80053 COMPREHEN METABOLIC PANEL: CPT

## 2024-03-24 PROCEDURE — 85025 COMPLETE CBC W/AUTO DIFF WBC: CPT

## 2024-03-24 PROCEDURE — 96374 THER/PROPH/DIAG INJ IV PUSH: CPT

## 2024-03-24 PROCEDURE — 96361 HYDRATE IV INFUSION ADD-ON: CPT

## 2024-03-24 PROCEDURE — 96376 TX/PRO/DX INJ SAME DRUG ADON: CPT

## 2024-03-24 PROCEDURE — 96375 TX/PRO/DX INJ NEW DRUG ADDON: CPT

## 2024-03-24 PROCEDURE — 2580000003 HC RX 258: Performed by: STUDENT IN AN ORGANIZED HEALTH CARE EDUCATION/TRAINING PROGRAM

## 2024-03-24 PROCEDURE — 83735 ASSAY OF MAGNESIUM: CPT

## 2024-03-24 PROCEDURE — 85610 PROTHROMBIN TIME: CPT

## 2024-03-24 PROCEDURE — 82077 ASSAY SPEC XCP UR&BREATH IA: CPT

## 2024-03-24 PROCEDURE — 6370000000 HC RX 637 (ALT 250 FOR IP): Performed by: STUDENT IN AN ORGANIZED HEALTH CARE EDUCATION/TRAINING PROGRAM

## 2024-03-24 PROCEDURE — 96366 THER/PROPH/DIAG IV INF ADDON: CPT

## 2024-03-24 PROCEDURE — 96365 THER/PROPH/DIAG IV INF INIT: CPT

## 2024-03-24 RX ORDER — ONDANSETRON 4 MG/1
4 TABLET, ORALLY DISINTEGRATING ORAL 3 TIMES DAILY PRN
Qty: 21 TABLET | Refills: 0 | Status: SHIPPED | OUTPATIENT
Start: 2024-03-24

## 2024-03-24 RX ORDER — ONDANSETRON 2 MG/ML
4 INJECTION INTRAMUSCULAR; INTRAVENOUS ONCE
Status: COMPLETED | OUTPATIENT
Start: 2024-03-24 | End: 2024-03-24

## 2024-03-24 RX ORDER — SODIUM CHLORIDE, SODIUM LACTATE, POTASSIUM CHLORIDE, AND CALCIUM CHLORIDE .6; .31; .03; .02 G/100ML; G/100ML; G/100ML; G/100ML
2000 INJECTION, SOLUTION INTRAVENOUS ONCE
Status: COMPLETED | OUTPATIENT
Start: 2024-03-24 | End: 2024-03-24

## 2024-03-24 RX ORDER — DIAZEPAM 10 MG/1
10 TABLET ORAL ONCE
Status: COMPLETED | OUTPATIENT
Start: 2024-03-24 | End: 2024-03-24

## 2024-03-24 RX ORDER — ONDANSETRON 2 MG/ML
8 INJECTION INTRAMUSCULAR; INTRAVENOUS ONCE
Status: COMPLETED | OUTPATIENT
Start: 2024-03-24 | End: 2024-03-24

## 2024-03-24 RX ADMIN — SODIUM CHLORIDE, POTASSIUM CHLORIDE, SODIUM LACTATE AND CALCIUM CHLORIDE 2000 ML: 600; 310; 30; 20 INJECTION, SOLUTION INTRAVENOUS at 19:29

## 2024-03-24 RX ADMIN — ONDANSETRON 8 MG: 2 INJECTION INTRAMUSCULAR; INTRAVENOUS at 19:31

## 2024-03-24 RX ADMIN — ONDANSETRON 4 MG: 2 INJECTION INTRAMUSCULAR; INTRAVENOUS at 23:06

## 2024-03-24 RX ADMIN — DIAZEPAM 10 MG: 10 TABLET ORAL at 22:05

## 2024-03-24 RX ADMIN — FOLIC ACID: 5 INJECTION, SOLUTION INTRAMUSCULAR; INTRAVENOUS; SUBCUTANEOUS at 21:32

## 2024-03-24 ASSESSMENT — PAIN DESCRIPTION - DESCRIPTORS: DESCRIPTORS: CRAMPING

## 2024-03-24 ASSESSMENT — PAIN - FUNCTIONAL ASSESSMENT: PAIN_FUNCTIONAL_ASSESSMENT: NONE - DENIES PAIN

## 2024-03-24 NOTE — ED PROVIDER NOTES
THE [facility]  EMERGENCY DEPARTMENT ENCOUNTER          ATTENDING PHYSICIAN NOTE       Date of evaluation: 3/24/2024    ADDENDUM:      Care of this patient was assumed from Dr. Hager.  The patient was seen for Nausea and Emesis (Patient with chief complaint of N/V that has been occurring for the past 3 days, not able to eat anything. Patient Hx of alcohol abuse, last drink 1100am)  .  The patient's initial evaluation and plan have been discussed with the prior provider who initially evaluated the patient.  Nursing Notes, Past Medical Hx, Past Surgical Hx, Social Hx, Allergies, and Family Hx were all reviewed.    ASSESSMENT / PLAN  (MEDICAL DECISION MAKING)     Bill Fish is a 44 y.o. male with hx of AUD who drinks a pint a day with a hx of esophagitis.  Pt with N/V x3 days with last drink at 11am. Pt with prior inpatient stays for detox in the past. Pt with some tremors, ketosis upon arrival. Initial CIWA was noted to be 9.  On my assessment after talking the patient he states that he has had some alcohol to drink.  He does not have any significant tremors.  He states that he just wants to feel better so that he can walk into the [facility] facility tomorrow.    Is this patient to be included in the SEP-1 core measure? No Exclusion criteria - the patient is NOT to be included for SEP-1 Core Measure due to: Infection is not suspected    Medical Decision Making  Patient presented to the hospital for evaluation of alcohol withdrawal detox.  He was noted to be having nausea vomiting with symptoms not improving over the past few hours.  Patient's workup here was overall unremarkable.  He was given antiemetics and was able to keep that down.  He does have a prescription for Zofran which was given to him.  Patient wanted to follow-up outpatient with [facility] and stated that he felt better here after observation and treatment with a rally pack, Valium and IV fluids.    Amount and/or Complexity of Data

## 2024-03-24 NOTE — ED PROVIDER NOTES
THE [facility]  EMERGENCY DEPARTMENT ENCOUNTER          ATTENDING PHYSICIAN NOTE       Date of evaluation: 3/24/2024    Chief Complaint     Nausea and Emesis (Patient with chief complaint of N/V that has been occurring for the past 3 days, not able to eat anything. Patient Hx of alcohol abuse, last drink 1100am)    History of Present Illness     Bill Fish is a 44 y.o. male with pertinent PMHx including alcohol use disorder normally drinking 1 pint per day who presents with nausea, vomiting in the setting of relapse of his alcohol use disorder.  Patient states that he relapsed approximately 6 weeks ago and drinks 1 pint of vodka per day.  Last drink was 11 AM it.  For the last 3 days has been having intractable nausea with nonbloody, nonbilious emesis.  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  No history of cirrhosis or pancreatitis.  He is interested in regaining his sobriety and has already talked to [facility] who may have an inpatient bed for him for detox in the next 24-48 hours. Patient notably was recently seen on 3/4/2024 for a possible syncopal episode and was found to be flu positive.  Endorses some general abdominal cramping but otherwise no other symptoms.  Patient reports no history of alcohol withdrawal seizures in the past although has required admissions.    Nursing notes, PSHx, Social history, current medications and allergies were reviewed as documented in EHR and triage.     Physical Exam     INITIAL VITALS: BP: (!) 175/122, Temp: 98.8 °F (37.1 °C), Pulse: (!) 114, Respirations: 20, SpO2: 98 %     General:  WDWN male, appears mildly uncomfortable, clammy, smells of ketones  HEENT:  Normocephalic, atraumatic, MMM. No facial asymmetry.   Eyes: Anicteric, EOMI, PERRL   Neck:  Supple, full ROM, no bony TTP, no paraspinal TTP, no LAD   Pulmonary:   CTA bl, no wheezes, rales, rhonchi, no increased

## 2024-03-25 NOTE — DISCHARGE INSTRUCTIONS
Please follow up with Kellen as we discussed.  Return to the hospital if you have any further episodes of vomiting, chest pain, fever, chills.

## 2024-06-27 ENCOUNTER — HOSPITAL ENCOUNTER (INPATIENT)
Age: 44
LOS: 2 days | Discharge: HOME | End: 2024-06-29
Attending: EMERGENCY MEDICINE | Admitting: INTERNAL MEDICINE
Payer: MEDICAID

## 2024-06-27 ENCOUNTER — APPOINTMENT (OUTPATIENT)
Dept: CT IMAGING | Age: 44
End: 2024-06-27
Payer: MEDICAID

## 2024-06-27 DIAGNOSIS — K76.0 HEPATIC STEATOSIS: ICD-10-CM

## 2024-06-27 DIAGNOSIS — F10.932 ALCOHOL WITHDRAWAL SYNDROME WITH PERCEPTUAL DISTURBANCE (HCC): Primary | ICD-10-CM

## 2024-06-27 LAB
ALBUMIN SERPL-MCNC: 4.8 G/DL (ref 3.4–5)
ALP SERPL-CCNC: 260 U/L (ref 40–129)
ALT SERPL-CCNC: 77 U/L (ref 10–40)
ANION GAP SERPL CALCULATED.3IONS-SCNC: 29 MMOL/L (ref 3–16)
AST SERPL-CCNC: 201 U/L (ref 15–37)
BASOPHILS # BLD: 0.1 K/UL (ref 0–0.2)
BASOPHILS NFR BLD: 1.3 %
BILIRUB DIRECT SERPL-MCNC: 0.6 MG/DL (ref 0–0.3)
BILIRUB INDIRECT SERPL-MCNC: 1.2 MG/DL (ref 0–1)
BILIRUB SERPL-MCNC: 1.8 MG/DL (ref 0–1)
BUN SERPL-MCNC: 11 MG/DL (ref 7–20)
CALCIUM SERPL-MCNC: 9.8 MG/DL (ref 8.3–10.6)
CHLORIDE SERPL-SCNC: 84 MMOL/L (ref 99–110)
CO2 SERPL-SCNC: 24 MMOL/L (ref 21–32)
CREAT SERPL-MCNC: 1.3 MG/DL (ref 0.9–1.3)
DEPRECATED RDW RBC AUTO: 17.2 % (ref 12.4–15.4)
EOSINOPHIL # BLD: 0 K/UL (ref 0–0.6)
EOSINOPHIL NFR BLD: 0.2 %
ETHANOLAMINE SERPL-MCNC: 80 MG/DL (ref 0–0.08)
GFR SERPLBLD CREATININE-BSD FMLA CKD-EPI: 70 ML/MIN/{1.73_M2}
GLUCOSE SERPL-MCNC: 88 MG/DL (ref 70–99)
HCT VFR BLD AUTO: 32.4 % (ref 40.5–52.5)
HGB BLD-MCNC: 10.8 G/DL (ref 13.5–17.5)
LIPASE SERPL-CCNC: 69 U/L (ref 13–60)
LYMPHOCYTES # BLD: 1 K/UL (ref 1–5.1)
LYMPHOCYTES NFR BLD: 18.8 %
MCH RBC QN AUTO: 33.6 PG (ref 26–34)
MCHC RBC AUTO-ENTMCNC: 33.3 G/DL (ref 31–36)
MCV RBC AUTO: 101.1 FL (ref 80–100)
MONOCYTES # BLD: 0.5 K/UL (ref 0–1.3)
MONOCYTES NFR BLD: 9.2 %
NEUTROPHILS # BLD: 3.6 K/UL (ref 1.7–7.7)
NEUTROPHILS NFR BLD: 70.5 %
PLATELET # BLD AUTO: 122 K/UL (ref 135–450)
PMV BLD AUTO: 7.5 FL (ref 5–10.5)
POTASSIUM SERPL-SCNC: 4.3 MMOL/L (ref 3.5–5.1)
PROT SERPL-MCNC: 9.1 G/DL (ref 6.4–8.2)
RBC # BLD AUTO: 3.21 M/UL (ref 4.2–5.9)
SODIUM SERPL-SCNC: 137 MMOL/L (ref 136–145)
WBC # BLD AUTO: 5.1 K/UL (ref 4–11)

## 2024-06-27 PROCEDURE — 6370000000 HC RX 637 (ALT 250 FOR IP): Performed by: INTERNAL MEDICINE

## 2024-06-27 PROCEDURE — 1200000000 HC SEMI PRIVATE

## 2024-06-27 PROCEDURE — 80048 BASIC METABOLIC PNL TOTAL CA: CPT

## 2024-06-27 PROCEDURE — 80076 HEPATIC FUNCTION PANEL: CPT

## 2024-06-27 PROCEDURE — 6360000002 HC RX W HCPCS: Performed by: INTERNAL MEDICINE

## 2024-06-27 PROCEDURE — 6360000004 HC RX CONTRAST MEDICATION: Performed by: STUDENT IN AN ORGANIZED HEALTH CARE EDUCATION/TRAINING PROGRAM

## 2024-06-27 PROCEDURE — 2580000003 HC RX 258: Performed by: STUDENT IN AN ORGANIZED HEALTH CARE EDUCATION/TRAINING PROGRAM

## 2024-06-27 PROCEDURE — 6360000002 HC RX W HCPCS: Performed by: STUDENT IN AN ORGANIZED HEALTH CARE EDUCATION/TRAINING PROGRAM

## 2024-06-27 PROCEDURE — 83690 ASSAY OF LIPASE: CPT

## 2024-06-27 PROCEDURE — 96374 THER/PROPH/DIAG INJ IV PUSH: CPT

## 2024-06-27 PROCEDURE — 85025 COMPLETE CBC W/AUTO DIFF WBC: CPT

## 2024-06-27 PROCEDURE — 6370000000 HC RX 637 (ALT 250 FOR IP): Performed by: STUDENT IN AN ORGANIZED HEALTH CARE EDUCATION/TRAINING PROGRAM

## 2024-06-27 PROCEDURE — 99285 EMERGENCY DEPT VISIT HI MDM: CPT

## 2024-06-27 PROCEDURE — 74177 CT ABD & PELVIS W/CONTRAST: CPT

## 2024-06-27 PROCEDURE — 82077 ASSAY SPEC XCP UR&BREATH IA: CPT

## 2024-06-27 PROCEDURE — 36415 COLL VENOUS BLD VENIPUNCTURE: CPT

## 2024-06-27 RX ORDER — GAUZE BANDAGE 2" X 2"
100 BANDAGE TOPICAL DAILY
Status: DISCONTINUED | OUTPATIENT
Start: 2024-06-28 | End: 2024-06-29 | Stop reason: HOSPADM

## 2024-06-27 RX ORDER — LORAZEPAM 2 MG/ML
2 INJECTION INTRAMUSCULAR ONCE
Status: COMPLETED | OUTPATIENT
Start: 2024-06-27 | End: 2024-06-27

## 2024-06-27 RX ORDER — LORAZEPAM 1 MG/1
2 TABLET ORAL
Status: DISCONTINUED | OUTPATIENT
Start: 2024-06-27 | End: 2024-06-29 | Stop reason: HOSPADM

## 2024-06-27 RX ORDER — SODIUM CHLORIDE 0.9 % (FLUSH) 0.9 %
5-40 SYRINGE (ML) INJECTION EVERY 12 HOURS SCHEDULED
Status: DISCONTINUED | OUTPATIENT
Start: 2024-06-27 | End: 2024-06-29 | Stop reason: HOSPADM

## 2024-06-27 RX ORDER — ONDANSETRON 2 MG/ML
4 INJECTION INTRAMUSCULAR; INTRAVENOUS EVERY 6 HOURS PRN
Status: DISCONTINUED | OUTPATIENT
Start: 2024-06-27 | End: 2024-06-29 | Stop reason: HOSPADM

## 2024-06-27 RX ORDER — DIAZEPAM 5 MG/1
5 TABLET ORAL ONCE
Status: COMPLETED | OUTPATIENT
Start: 2024-06-27 | End: 2024-06-27

## 2024-06-27 RX ORDER — ENOXAPARIN SODIUM 100 MG/ML
40 INJECTION SUBCUTANEOUS DAILY
Status: DISCONTINUED | OUTPATIENT
Start: 2024-06-28 | End: 2024-06-29 | Stop reason: HOSPADM

## 2024-06-27 RX ORDER — POLYETHYLENE GLYCOL 3350 17 G/17G
17 POWDER, FOR SOLUTION ORAL DAILY PRN
Status: DISCONTINUED | OUTPATIENT
Start: 2024-06-27 | End: 2024-06-29 | Stop reason: HOSPADM

## 2024-06-27 RX ORDER — ACETAMINOPHEN 650 MG/1
650 SUPPOSITORY RECTAL EVERY 6 HOURS PRN
Status: DISCONTINUED | OUTPATIENT
Start: 2024-06-27 | End: 2024-06-29 | Stop reason: HOSPADM

## 2024-06-27 RX ORDER — ACETAMINOPHEN 325 MG/1
650 TABLET ORAL EVERY 6 HOURS PRN
Status: DISCONTINUED | OUTPATIENT
Start: 2024-06-27 | End: 2024-06-29 | Stop reason: HOSPADM

## 2024-06-27 RX ORDER — LORAZEPAM 1 MG/1
3 TABLET ORAL
Status: DISCONTINUED | OUTPATIENT
Start: 2024-06-27 | End: 2024-06-29 | Stop reason: HOSPADM

## 2024-06-27 RX ORDER — SODIUM CHLORIDE 0.9 % (FLUSH) 0.9 %
5-40 SYRINGE (ML) INJECTION PRN
Status: DISCONTINUED | OUTPATIENT
Start: 2024-06-27 | End: 2024-06-29 | Stop reason: HOSPADM

## 2024-06-27 RX ORDER — SODIUM CHLORIDE, SODIUM LACTATE, POTASSIUM CHLORIDE, AND CALCIUM CHLORIDE .6; .31; .03; .02 G/100ML; G/100ML; G/100ML; G/100ML
1000 INJECTION, SOLUTION INTRAVENOUS ONCE
Status: DISCONTINUED | OUTPATIENT
Start: 2024-06-27 | End: 2024-06-27

## 2024-06-27 RX ORDER — SODIUM CHLORIDE, SODIUM LACTATE, POTASSIUM CHLORIDE, AND CALCIUM CHLORIDE .6; .31; .03; .02 G/100ML; G/100ML; G/100ML; G/100ML
1000 INJECTION, SOLUTION INTRAVENOUS ONCE
Status: COMPLETED | OUTPATIENT
Start: 2024-06-27 | End: 2024-06-27

## 2024-06-27 RX ORDER — DEXTROSE MONOHYDRATE AND SODIUM CHLORIDE 5; .45 G/100ML; G/100ML
INJECTION, SOLUTION INTRAVENOUS CONTINUOUS
Status: DISCONTINUED | OUTPATIENT
Start: 2024-06-27 | End: 2024-06-28

## 2024-06-27 RX ORDER — POTASSIUM CHLORIDE 7.45 MG/ML
10 INJECTION INTRAVENOUS PRN
Status: DISCONTINUED | OUTPATIENT
Start: 2024-06-27 | End: 2024-06-28

## 2024-06-27 RX ORDER — LORAZEPAM 2 MG/ML
2 INJECTION INTRAMUSCULAR
Status: DISCONTINUED | OUTPATIENT
Start: 2024-06-27 | End: 2024-06-29 | Stop reason: HOSPADM

## 2024-06-27 RX ORDER — POTASSIUM CHLORIDE 20 MEQ/1
40 TABLET, EXTENDED RELEASE ORAL PRN
Status: DISCONTINUED | OUTPATIENT
Start: 2024-06-27 | End: 2024-06-28

## 2024-06-27 RX ORDER — DIAZEPAM 10 MG/1
10 TABLET ORAL ONCE
Status: DISCONTINUED | OUTPATIENT
Start: 2024-06-27 | End: 2024-06-27

## 2024-06-27 RX ORDER — LORAZEPAM 1 MG/1
1 TABLET ORAL
Status: DISCONTINUED | OUTPATIENT
Start: 2024-06-27 | End: 2024-06-29 | Stop reason: HOSPADM

## 2024-06-27 RX ORDER — LORAZEPAM 2 MG/ML
1 INJECTION INTRAMUSCULAR
Status: DISCONTINUED | OUTPATIENT
Start: 2024-06-27 | End: 2024-06-29 | Stop reason: HOSPADM

## 2024-06-27 RX ORDER — MAGNESIUM SULFATE IN WATER 40 MG/ML
2000 INJECTION, SOLUTION INTRAVENOUS PRN
Status: DISCONTINUED | OUTPATIENT
Start: 2024-06-27 | End: 2024-06-28

## 2024-06-27 RX ORDER — LORAZEPAM 1 MG/1
4 TABLET ORAL
Status: DISCONTINUED | OUTPATIENT
Start: 2024-06-27 | End: 2024-06-29 | Stop reason: HOSPADM

## 2024-06-27 RX ORDER — LORAZEPAM 2 MG/ML
4 INJECTION INTRAMUSCULAR
Status: DISCONTINUED | OUTPATIENT
Start: 2024-06-27 | End: 2024-06-29 | Stop reason: HOSPADM

## 2024-06-27 RX ORDER — FOLIC ACID 1 MG/1
1 TABLET ORAL DAILY
Status: DISCONTINUED | OUTPATIENT
Start: 2024-06-28 | End: 2024-06-29 | Stop reason: HOSPADM

## 2024-06-27 RX ORDER — DEXTROSE, SODIUM CHLORIDE, SODIUM LACTATE, POTASSIUM CHLORIDE, AND CALCIUM CHLORIDE 5; .6; .31; .03; .02 G/100ML; G/100ML; G/100ML; G/100ML; G/100ML
1000 INJECTION, SOLUTION INTRAVENOUS CONTINUOUS
Status: DISCONTINUED | OUTPATIENT
Start: 2024-06-27 | End: 2024-06-28

## 2024-06-27 RX ORDER — LORAZEPAM 2 MG/ML
3 INJECTION INTRAMUSCULAR
Status: DISCONTINUED | OUTPATIENT
Start: 2024-06-27 | End: 2024-06-29 | Stop reason: HOSPADM

## 2024-06-27 RX ORDER — SODIUM CHLORIDE 9 MG/ML
INJECTION, SOLUTION INTRAVENOUS PRN
Status: DISCONTINUED | OUTPATIENT
Start: 2024-06-27 | End: 2024-06-29 | Stop reason: HOSPADM

## 2024-06-27 RX ADMIN — LORAZEPAM 2 MG: 2 INJECTION INTRAMUSCULAR; INTRAVENOUS at 22:19

## 2024-06-27 RX ADMIN — DIAZEPAM 5 MG: 5 TABLET ORAL at 15:31

## 2024-06-27 RX ADMIN — SODIUM CHLORIDE, POTASSIUM CHLORIDE, SODIUM LACTATE AND CALCIUM CHLORIDE 1000 ML: 600; 310; 30; 20 INJECTION, SOLUTION INTRAVENOUS at 18:58

## 2024-06-27 RX ADMIN — ONDANSETRON 4 MG: 2 INJECTION INTRAMUSCULAR; INTRAVENOUS at 19:53

## 2024-06-27 RX ADMIN — IOPAMIDOL 75 ML: 755 INJECTION, SOLUTION INTRAVENOUS at 16:25

## 2024-06-27 RX ADMIN — LORAZEPAM 2 MG: 2 INJECTION INTRAMUSCULAR; INTRAVENOUS at 17:37

## 2024-06-27 RX ADMIN — METOPROLOL TARTRATE 12.5 MG: 25 TABLET, FILM COATED ORAL at 22:18

## 2024-06-27 RX ADMIN — SODIUM CHLORIDE, SODIUM LACTATE, POTASSIUM CHLORIDE, CALCIUM CHLORIDE AND DEXTROSE MONOHYDRATE 1000 ML: 5; 600; 310; 30; 20 INJECTION, SOLUTION INTRAVENOUS at 16:36

## 2024-06-27 ASSESSMENT — PAIN DESCRIPTION - FREQUENCY: FREQUENCY: CONTINUOUS

## 2024-06-27 ASSESSMENT — PAIN DESCRIPTION - LOCATION: LOCATION: ABDOMEN

## 2024-06-27 ASSESSMENT — PAIN SCALES - GENERAL: PAINLEVEL_OUTOF10: 8

## 2024-06-27 ASSESSMENT — PAIN DESCRIPTION - ONSET: ONSET: PROGRESSIVE

## 2024-06-27 ASSESSMENT — PAIN - FUNCTIONAL ASSESSMENT
PAIN_FUNCTIONAL_ASSESSMENT: INTOLERABLE, UNABLE TO DO ANY ACTIVE OR PASSIVE ACTIVITIES
PAIN_FUNCTIONAL_ASSESSMENT: 0-10

## 2024-06-27 ASSESSMENT — PAIN DESCRIPTION - PAIN TYPE: TYPE: ACUTE PAIN

## 2024-06-27 ASSESSMENT — PAIN DESCRIPTION - DESCRIPTORS: DESCRIPTORS: CRAMPING

## 2024-06-27 NOTE — ED PROVIDER NOTES
THE [facility]  EMERGENCY DEPARTMENT ENCOUNTER          EM RESIDENT NOTE       Date of evaluation: 6/27/2024    Chief Complaint     Alcohol Problem (Last drink 0530. Usually drinks a fifth of vodka a day. C/o nausea abd pain. )    History of Present Illness     Bill Fish is a 44 y.o. male w/ PMHx EtOH use d/o who presents for evaluation of N/V x3 days, now w/ complete PO intolerance that has resulted in acute EtOH withdrawal.  Typically drinks a 5th of vodka per day, last drink was at 0500 this AM but reports he vomited immediately after.  Has had EtOH withdrawal sx before but denies any hx of withdrawal seizures; withdrawal symptoms typically include tremors & N/V.  All emesis has been NBNB; endorses diffuse abdominal pain but denies fevers, diarrhea, chest pain, dyspnea, cough or urinary sx.      MEDICAL DECISION MAKING / ASSESSMENT / PLAN     INITIAL VITALS: BP: 117/87, Temp: 98.9 °F (37.2 °C), Pulse: (!) 130, Respirations: 18, SpO2: 98 %    Bill Fish is a 44 y.o. male presenting for evaluation of acute EtOH withdrawal in setting of ongoing GI symptoms for several days.  On initial presentation patient is tachycardic but normotensive, afebrile, normoxic on RA, tremulous and uncomfortable appearing but NAD.  Physical exam notable for fine tremor w/ mild asterixis & tongue fasciculations, abdominal exam w/ diffuse TTP w/o focal acuity or onel peritonitis.  Diazepam given PO w/ transient improvement in sx but patient subsequently began vomiting again requiring benzodiazepines to be re-dosed IV.    Labs generally c/w long term EtOH use.  CBC notable for macrocytic anemia to Hgb 10.8 (.1) and mild thrombocytopenia to Plt 122, no leukocytosis.  BMP w/o significant electrolyte derangement, LIZZETTE or acidosis but w/ AG 29, suspected reflective of mixed lactic acidosis & starvation vs EtOH ketosis.  LFTs w/ mixed obstructive & hepatocellular transaminitis to alk phos 260,  and ALT 77;    HENT:      Head: Normocephalic and atraumatic.      Nose: Nose normal.      Mouth/Throat:      Mouth: Mucous membranes are moist.   Eyes:      General: No scleral icterus.        Right eye: No discharge.         Left eye: No discharge.      Extraocular Movements: Extraocular movements intact.      Conjunctiva/sclera: Conjunctivae normal.   Cardiovascular:      Rate and Rhythm: Regular rhythm. Tachycardia present.      Pulses: Normal pulses.   Pulmonary:      Effort: Pulmonary effort is normal. No respiratory distress.   Abdominal:      General: Abdomen is flat. There is no distension.      Palpations: Abdomen is soft.      Tenderness: There is abdominal tenderness. There is no guarding or rebound.      Comments: Diffuse abdominal TTP w/o focal acuity, rebound tenderness or guarding.   Musculoskeletal:         General: No deformity or signs of injury.      Right lower leg: No edema.      Left lower leg: No edema.   Skin:     General: Skin is warm and dry.      Capillary Refill: Capillary refill takes 2 to 3 seconds.      Coloration: Skin is not jaundiced.      Findings: No bruising.   Neurological:      General: No focal deficit present.      Mental Status: He is alert and oriented to person, place, and time.      Cranial Nerves: No cranial nerve deficit.      Comments: Diffusely tremulous w/ tongue fasciculations. +mild asterixis.    Psychiatric:         Mood and Affect: Mood normal.         Behavior: Behavior normal.         Thought Content: Thought content normal.         Judgment: Judgment normal.          Deana Reed MD  Resident  06/27/24 1958

## 2024-06-27 NOTE — ED PROVIDER NOTES
ED Attending Attestation Note     Date of evaluation: 6/27/2024    This patient was seen by the resident.  I have seen and examined the patient, agree with the workup, evaluation, management and diagnosis. The care plan has been discussed.  I have reviewed the ECG and concur with the resident's interpretation.  My assessment reveals a well-appearing middle-aged male lying in hospital stretcher comfortable no immediate distress.  On exam he is mildly tremulous his abdomen is soft mildly tender to palpation diffusely without rebound or guarding..     Tk Winston MD  06/27/24 6153

## 2024-06-27 NOTE — ED NOTES
Pt continues to have episodes of N/V, provider aware and states an antiemetic is not necessary at this time and that treating his ETOH withdrawal will treat his nausea. Provider made aware that pt unable to keep oral valium down due to frequent emesis and IV ativan ordered.       Marcia Francois, GRETA  06/27/24 0309       Marcia Francois, GRETA  06/27/24 6628

## 2024-06-28 LAB
ALBUMIN SERPL-MCNC: 4.1 G/DL (ref 3.4–5)
ALBUMIN/GLOB SERPL: 1.3 {RATIO} (ref 1.1–2.2)
ALP SERPL-CCNC: 201 U/L (ref 40–129)
ALT SERPL-CCNC: 56 U/L (ref 10–40)
ANION GAP SERPL CALCULATED.3IONS-SCNC: 14 MMOL/L (ref 3–16)
AST SERPL-CCNC: 128 U/L (ref 15–37)
BASOPHILS # BLD: 0 K/UL (ref 0–0.2)
BASOPHILS NFR BLD: 1.2 %
BILIRUB SERPL-MCNC: 1.2 MG/DL (ref 0–1)
BUN SERPL-MCNC: 11 MG/DL (ref 7–20)
CALCIUM SERPL-MCNC: 8.9 MG/DL (ref 8.3–10.6)
CHLORIDE SERPL-SCNC: 88 MMOL/L (ref 99–110)
CO2 SERPL-SCNC: 30 MMOL/L (ref 21–32)
CREAT SERPL-MCNC: 1.3 MG/DL (ref 0.9–1.3)
DEPRECATED RDW RBC AUTO: 16.6 % (ref 12.4–15.4)
EOSINOPHIL # BLD: 0 K/UL (ref 0–0.6)
EOSINOPHIL NFR BLD: 0.4 %
GFR SERPLBLD CREATININE-BSD FMLA CKD-EPI: 70 ML/MIN/{1.73_M2}
GLUCOSE SERPL-MCNC: 144 MG/DL (ref 70–99)
HCT VFR BLD AUTO: 26.1 % (ref 40.5–52.5)
HGB BLD-MCNC: 8.9 G/DL (ref 13.5–17.5)
LYMPHOCYTES # BLD: 0.7 K/UL (ref 1–5.1)
LYMPHOCYTES NFR BLD: 22.7 %
MAGNESIUM SERPL-MCNC: 0.9 MG/DL (ref 1.8–2.4)
MAGNESIUM SERPL-MCNC: 1.1 MG/DL (ref 1.8–2.4)
MAGNESIUM SERPL-MCNC: 2.2 MG/DL (ref 1.8–2.4)
MCH RBC QN AUTO: 34.3 PG (ref 26–34)
MCHC RBC AUTO-ENTMCNC: 34.1 G/DL (ref 31–36)
MCV RBC AUTO: 100.5 FL (ref 80–100)
MONOCYTES # BLD: 0.3 K/UL (ref 0–1.3)
MONOCYTES NFR BLD: 10.2 %
NEUTROPHILS # BLD: 1.9 K/UL (ref 1.7–7.7)
NEUTROPHILS NFR BLD: 65.5 %
PLATELET # BLD AUTO: 73 K/UL (ref 135–450)
PMV BLD AUTO: 7.4 FL (ref 5–10.5)
POTASSIUM SERPL-SCNC: 3.5 MMOL/L (ref 3.5–5.1)
PROT SERPL-MCNC: 7.2 G/DL (ref 6.4–8.2)
RBC # BLD AUTO: 2.59 M/UL (ref 4.2–5.9)
SODIUM SERPL-SCNC: 132 MMOL/L (ref 136–145)
WBC # BLD AUTO: 2.9 K/UL (ref 4–11)

## 2024-06-28 PROCEDURE — 6370000000 HC RX 637 (ALT 250 FOR IP)

## 2024-06-28 PROCEDURE — 6360000002 HC RX W HCPCS

## 2024-06-28 PROCEDURE — 2580000003 HC RX 258

## 2024-06-28 PROCEDURE — 6370000000 HC RX 637 (ALT 250 FOR IP): Performed by: INTERNAL MEDICINE

## 2024-06-28 PROCEDURE — 2580000003 HC RX 258: Performed by: INTERNAL MEDICINE

## 2024-06-28 PROCEDURE — 85025 COMPLETE CBC W/AUTO DIFF WBC: CPT

## 2024-06-28 PROCEDURE — 80053 COMPREHEN METABOLIC PANEL: CPT

## 2024-06-28 PROCEDURE — 6360000002 HC RX W HCPCS: Performed by: INTERNAL MEDICINE

## 2024-06-28 PROCEDURE — 83735 ASSAY OF MAGNESIUM: CPT

## 2024-06-28 PROCEDURE — 36415 COLL VENOUS BLD VENIPUNCTURE: CPT

## 2024-06-28 PROCEDURE — 1200000000 HC SEMI PRIVATE

## 2024-06-28 RX ORDER — DEXTROSE, SODIUM CHLORIDE, SODIUM LACTATE, POTASSIUM CHLORIDE, AND CALCIUM CHLORIDE 5; .6; .31; .03; .02 G/100ML; G/100ML; G/100ML; G/100ML; G/100ML
INJECTION, SOLUTION INTRAVENOUS CONTINUOUS
Status: DISCONTINUED | OUTPATIENT
Start: 2024-06-28 | End: 2024-06-29

## 2024-06-28 RX ADMIN — MAGNESIUM SULFATE HEPTAHYDRATE 6000 MG: 500 INJECTION, SOLUTION INTRAMUSCULAR; INTRAVENOUS at 09:36

## 2024-06-28 RX ADMIN — SODIUM CHLORIDE, SODIUM LACTATE, POTASSIUM CHLORIDE, CALCIUM CHLORIDE AND DEXTROSE MONOHYDRATE: 5; 600; 310; 30; 20 INJECTION, SOLUTION INTRAVENOUS at 22:52

## 2024-06-28 RX ADMIN — ALUMINUM HYDROXIDE, MAGNESIUM HYDROXIDE, AND SIMETHICONE: 1200; 120; 1200 SUSPENSION ORAL at 20:28

## 2024-06-28 RX ADMIN — METOPROLOL TARTRATE 12.5 MG: 25 TABLET, FILM COATED ORAL at 20:27

## 2024-06-28 RX ADMIN — FOLIC ACID 1 MG: 1 TABLET ORAL at 09:42

## 2024-06-28 RX ADMIN — MAGNESIUM SULFATE HEPTAHYDRATE 2000 MG: 40 INJECTION, SOLUTION INTRAVENOUS at 06:50

## 2024-06-28 RX ADMIN — METOPROLOL TARTRATE 12.5 MG: 25 TABLET, FILM COATED ORAL at 09:42

## 2024-06-28 RX ADMIN — ENOXAPARIN SODIUM 40 MG: 100 INJECTION SUBCUTANEOUS at 09:42

## 2024-06-28 RX ADMIN — DEXTROSE AND SODIUM CHLORIDE: 5; 450 INJECTION, SOLUTION INTRAVENOUS at 00:52

## 2024-06-28 RX ADMIN — DEXTROSE AND SODIUM CHLORIDE: 5; 450 INJECTION, SOLUTION INTRAVENOUS at 07:13

## 2024-06-28 RX ADMIN — Medication 100 MG: at 09:42

## 2024-06-28 RX ADMIN — SODIUM CHLORIDE, SODIUM LACTATE, POTASSIUM CHLORIDE, CALCIUM CHLORIDE AND DEXTROSE MONOHYDRATE: 5; 600; 310; 30; 20 INJECTION, SOLUTION INTRAVENOUS at 13:11

## 2024-06-28 NOTE — ED NOTES
ED TO INPATIENT SBAR HANDOFF    Patient Name: Bill Fish   :  1980  44 y.o.   MRN:  [id number]  Preferred Name  na  ED Room #:  A11/A11-11  Family/Caregiver Present no   Restraints no   Sitter no   Sepsis Risk Score      Situation  Code Status: Prior No additional code details.    Allergies: Amlodipine, Banana, Tree nut  [macadamia nut oil], and Carvedilol  Weight: Patient Vitals for the past 96 hrs (Last 3 readings):   Weight   24 1137 75.4 kg (166 lb 3.6 oz)     Arrived from: home  Chief Complaint:   Chief Complaint   Patient presents with    Alcohol Problem     Last drink 0530. Usually drinks a fifth of vodka a day. C/o nausea abd pain.      Hospital Problem/Diagnosis:  Principal Problem:    Alcohol withdrawal, uncomplicated (HCC)  Resolved Problems:    * No resolved hospital problems. *    Imaging:   CT ABDOMEN PELVIS W IV CONTRAST Additional Contrast? None   Final Result      Severe hepatic steatosis.         Electronically signed by Rashawn Cameron MD        Abnormal labs:   Abnormal Labs Reviewed   CBC WITH AUTO DIFFERENTIAL - Abnormal; Notable for the following components:       Result Value    RBC 3.21 (*)     Hemoglobin 10.8 (*)     Hematocrit 32.4 (*)     .1 (*)     RDW 17.2 (*)     Platelets 122 (*)     All other components within normal limits   HEPATIC FUNCTION PANEL - Abnormal; Notable for the following components:    Total Protein 9.1 (*)     Alkaline Phosphatase 260 (*)     ALT 77 (*)      (*)     Total Bilirubin 1.8 (*)     Bilirubin, Direct 0.6 (*)     Bilirubin, Indirect 1.2 (*)     All other components within normal limits   BASIC METABOLIC PANEL W/ REFLEX TO MG FOR LOW K - Abnormal; Notable for the following components:    Chloride 84 (*)     Anion Gap 29 (*)     All other components within normal limits   LIPASE - Abnormal; Notable for the following components:    Lipase 69.0 (*)     All other components within normal limits     Critical values: no     Abnormal  Assessment Findings: na    Background  History:   Past Medical History:   Diagnosis Date    Alcohol abuse     Anxiety     Hypertension        Assessment    Vitals/MEWS:        Vitals:    06/27/24 1738 06/27/24 1757 06/27/24 1839 06/27/24 1930   BP:  (!) 143/108  (!) 157/105   Pulse: (!) 102 (!) 113 (!) 118 99   Resp:       Temp:       TempSrc:       SpO2:  96%  100%   Weight:         FiO2 (%): na  O2 Flow Rate:      Cardiac Rhythm: Cardiac Rhythm: Sinus rhythm, Sinus tachy  Pain Assessment: 0 [x] Verbal [] Delarosa Nascimento Scale  Pain Scale: Pain Assessment  Pain Assessment: 0-10  Pain Level: 8  Pain Location: Abdomen  Pain Descriptors: Cramping  Functional Pain Assessment: Intolerable, unable to do any active or passive activities  Pain Type: Acute pain  Pain Frequency: Continuous  Pain Onset: Progressive  Last documented pain score (0-10 scale) Pain Level: 8  Last documented pain medication administered: na  Mental Status: oriented, alert, logical, thought processes intact, and able to concentrate and follow conversation  Orientation Level:    NIH Score:    C-SSRS: Risk of Suicide: No Risk  Bedside swallow:    Vitaliy Coma Scale (GCS): [place] Coma Scale  Eye Opening: Spontaneous  Best Verbal Response: Oriented  Best Motor Response: Obeys commands  [place] Coma Scale Score: 15  Active LDA's:   Peripheral IV 06/27/24 Right Antecubital (Active)   Site Assessment Clean, dry & intact 06/27/24 1513   Line Status Blood return noted 06/27/24 1513   Phlebitis Assessment No symptoms 06/27/24 1513   Infiltration Assessment 0 06/27/24 1513                 PO Status: Regular  Pertinent or High Risk Medications/Drips: no   If Yes, please provide details: na  Pending Blood Product Administration: no       You may also review the ED PT Care Timeline found under the Summary Nursing Index tab.    Recommendation    Pending orders na  Plan for Discharge (if known):   Additional Comments: na   If any further questions, please call Sending

## 2024-06-28 NOTE — CARE COORDINATION
Case Management Assessment  Initial Evaluation    Date/Time of Evaluation: 6/28/2024 1:35 PM  Assessment Completed by: ALEX Marcum    If patient is discharged prior to next notation, then this note serves as note for discharge by case management.    Patient Name: Bill Fish                   YOB: 1980  Diagnosis: Hepatic steatosis [K76.0]  Alcohol withdrawal, uncomplicated (HCC) [F10.930]  Alcohol withdrawal syndrome with perceptual disturbance (HCC) [F10.932]                   Date / Time: 6/27/2024  3:03 PM    Patient Admission Status: Inpatient   Readmission Risk (Low < 19, Mod (19-27), High > 27): Readmission Risk Score: 11.8    Current PCP: Dominic Manuel MD  PCP verified by CM? Yes    Chart Reviewed: Yes      History Provided by: Patient  Patient Orientation: Alert and Oriented    Patient Cognition: Alert    Hospitalization in the last 30 days (Readmission):  No    If yes, Readmission Assessment in CM Navigator will be completed.    Advance Directives:      Code Status: Full Code   Patient's Primary Decision Maker is: Legal Next of Kin      Discharge Planning:    Patient lives with: Children, Parent Type of Home: House  Primary Care Giver: Self  Patient Support Systems include: Family Members   Current Financial resources: Medicaid  Current community resources: None  Current services prior to admission: None            Current DME:              Type of Home Care services:  None    ADLS  Prior functional level: Independent in ADLs/IADLs  Current functional level: Independent in ADLs/IADLs    PT AM-PAC:   /24  OT AM-PAC:   /24    Family can provide assistance at DC: Yes  Would you like Case Management to discuss the discharge plan with any other family members/significant others, and if so, who? No  Plans to Return to Present Housing: Yes  Other Identified Issues/Barriers to RETURNING to current housing: yes  Potential Assistance needed at discharge: N/A            Potential DME:

## 2024-06-28 NOTE — PLAN OF CARE
Problem: Safety - Adult  Goal: Free from fall injury  Outcome: Progressing  Flowsheets (Taken 6/27/2024 8068)  Free From Fall Injury: Based on caregiver fall risk screen, instruct family/caregiver to ask for assistance with transferring infant if caregiver noted to have fall risk factors  Note: Patient is free from fall. Discuss with patient safety measures and ensured all are in place. Bed locked in lowest position, bed alarm on, gripper sock worn by patient, bedside table with patient's belonging and call light within patient reached.     Problem: Discharge Planning  Goal: Discharge to home or other facility with appropriate resources  Outcome: Progressing

## 2024-06-28 NOTE — H&P
V2.0  History and Physical      Name:  Bill Fish /Age/Sex: 1980  (44 y.o. male)   MRN & CSN:  [id number] & [id number] Encounter Date/Time: 2024 8:08 PM EDT   Location:  [place] [id number] PCP: Dominic Manuel MD       Hospital Day: 1    Assessment and Plan:   Bill Fish is a 44 y.o. male with a pmh of alcohol use disorder, hypertension who presents with Alcohol withdrawal, uncomplicated (HCC)    Hospital Problems             Last Modified POA    * (Principal) Alcohol withdrawal, uncomplicated (HCC) 2024 Yes       #Alcohol use disorder  #Alcohol withdrawal without delirium  Started on IV fluids with D5W normal saline  Thiamine, folic acid  CIWA protocol with Ativan  Monitor for seizure  Monitor electrolytes.    Counseled regarding cessation.   consulted.  Due to recurrent hospitalization for alcohol withdrawal he may benefit from inpatient detox unit    #Persistent nausea and vomiting likely due to alcohol withdrawal  As needed Zofran, advance diet as tolerated, IV fluids     #Hypertension  Continue metoprolol.    # Elevated LFTs likely due to alcoholic hepatosteatosis  Will continue to monitor      Disposition:   Current Living situation: Home  Expected Disposition: Home  Estimated D/C: 2024    Diet No diet orders on file   DVT Prophylaxis [x] Lovenox, []  Heparin, [] SCDs, [] Ambulation,  [] Eliquis, [] Xarelto, [] Coumadin   Code Status Prior Full Code   Surrogate Decision Maker/ POA N/A     I reviewed and evaluated all imaging studies and test results discussed with patient and ED physician.  They agree with current management.  Further plan of care based on patient's overall condition and outcome of the ordered test results.   Time spent 80 minutes for evaluation, consultation, reviewing chart, test results and imaging studies.    Comment: Please note this report has been produced using speech recognition software and may contain errors related to that system

## 2024-06-28 NOTE — FLOWSHEET NOTE
Received from ED with complaint of abdominal pain, nausea and vomiting and alcohol withdrawal. Alert and oriented x4 on room air. [place] to staff, bed set up, room and use of call light. Discuss with patient safety measures and ensured all are in place. Physical assessment done and vital signs checked and recorded. Keep patient comfortable in bed.       06/27/24 2052   Vitals   Temp 99 °F (37.2 °C)   Temp Source Oral   Pulse 98   Heart Rate Source Monitor   Respirations 18   BP (!) 158/85   MAP (Calculated) 109   BP Location Left upper arm   BP Upper/Lower Upper   BP Method Automatic   Patient Position Semi fowlers   Pain Assessment   Pain Assessment None - Denies Pain   Opioid-Induced Sedation   POSS Score 1   RASS   Sweeney Agitation Sedation Scale (RASS) 0   Oxygen Therapy   SpO2 99 %   O2 Device None (Room air)

## 2024-06-29 VITALS
WEIGHT: 166.67 LBS | HEIGHT: 69 IN | TEMPERATURE: 99.1 F | SYSTOLIC BLOOD PRESSURE: 157 MMHG | RESPIRATION RATE: 18 BRPM | DIASTOLIC BLOOD PRESSURE: 91 MMHG | BODY MASS INDEX: 24.69 KG/M2 | HEART RATE: 107 BPM | OXYGEN SATURATION: 97 %

## 2024-06-29 LAB
ALBUMIN SERPL-MCNC: 4 G/DL (ref 3.4–5)
ALP SERPL-CCNC: 213 U/L (ref 40–129)
ALT SERPL-CCNC: 85 U/L (ref 10–40)
ANION GAP SERPL CALCULATED.3IONS-SCNC: 13 MMOL/L (ref 3–16)
AST SERPL-CCNC: 260 U/L (ref 15–37)
BASOPHILS # BLD: 0 K/UL (ref 0–0.2)
BASOPHILS NFR BLD: 0.7 %
BILIRUB DIRECT SERPL-MCNC: 0.4 MG/DL (ref 0–0.3)
BILIRUB INDIRECT SERPL-MCNC: 0.5 MG/DL (ref 0–1)
BILIRUB SERPL-MCNC: 0.9 MG/DL (ref 0–1)
BUN SERPL-MCNC: 6 MG/DL (ref 7–20)
CALCIUM SERPL-MCNC: 9.8 MG/DL (ref 8.3–10.6)
CHLORIDE SERPL-SCNC: 91 MMOL/L (ref 99–110)
CO2 SERPL-SCNC: 29 MMOL/L (ref 21–32)
CREAT SERPL-MCNC: 0.9 MG/DL (ref 0.9–1.3)
DEPRECATED RDW RBC AUTO: 16 % (ref 12.4–15.4)
EOSINOPHIL # BLD: 0 K/UL (ref 0–0.6)
EOSINOPHIL NFR BLD: 1.3 %
GFR SERPLBLD CREATININE-BSD FMLA CKD-EPI: >90 ML/MIN/{1.73_M2}
GLUCOSE SERPL-MCNC: 110 MG/DL (ref 70–99)
HCT VFR BLD AUTO: 28.3 % (ref 40.5–52.5)
HGB BLD-MCNC: 9.7 G/DL (ref 13.5–17.5)
LYMPHOCYTES # BLD: 0.8 K/UL (ref 1–5.1)
LYMPHOCYTES NFR BLD: 24.9 %
MAGNESIUM SERPL-MCNC: 1.9 MG/DL (ref 1.8–2.4)
MCH RBC QN AUTO: 35.2 PG (ref 26–34)
MCHC RBC AUTO-ENTMCNC: 34.4 G/DL (ref 31–36)
MCV RBC AUTO: 102.4 FL (ref 80–100)
MONOCYTES # BLD: 0.2 K/UL (ref 0–1.3)
MONOCYTES NFR BLD: 5.1 %
NEUTROPHILS # BLD: 2.3 K/UL (ref 1.7–7.7)
NEUTROPHILS NFR BLD: 68 %
PHOSPHATE SERPL-MCNC: 1.1 MG/DL (ref 2.5–4.9)
PLATELET # BLD AUTO: 82 K/UL (ref 135–450)
PMV BLD AUTO: 7.6 FL (ref 5–10.5)
POTASSIUM SERPL-SCNC: 3.3 MMOL/L (ref 3.5–5.1)
PROT SERPL-MCNC: 7.7 G/DL (ref 6.4–8.2)
RBC # BLD AUTO: 2.77 M/UL (ref 4.2–5.9)
SODIUM SERPL-SCNC: 133 MMOL/L (ref 136–145)
WBC # BLD AUTO: 3.4 K/UL (ref 4–11)

## 2024-06-29 PROCEDURE — 80069 RENAL FUNCTION PANEL: CPT

## 2024-06-29 PROCEDURE — 80076 HEPATIC FUNCTION PANEL: CPT

## 2024-06-29 PROCEDURE — 85025 COMPLETE CBC W/AUTO DIFF WBC: CPT

## 2024-06-29 PROCEDURE — 83735 ASSAY OF MAGNESIUM: CPT

## 2024-06-29 PROCEDURE — 6370000000 HC RX 637 (ALT 250 FOR IP): Performed by: INTERNAL MEDICINE

## 2024-06-29 PROCEDURE — 6370000000 HC RX 637 (ALT 250 FOR IP)

## 2024-06-29 PROCEDURE — 36415 COLL VENOUS BLD VENIPUNCTURE: CPT

## 2024-06-29 RX ORDER — M-VIT,TX,IRON,MINS/CALC/FOLIC 27MG-0.4MG
1 TABLET ORAL DAILY
Qty: 30 TABLET | Refills: 0 | Status: SHIPPED | OUTPATIENT
Start: 2024-06-29

## 2024-06-29 RX ORDER — POLYETHYLENE GLYCOL 3350 17 G/17G
17 POWDER, FOR SOLUTION ORAL DAILY PRN
Qty: 30 PACKET | Refills: 0 | Status: SHIPPED | OUTPATIENT
Start: 2024-06-29 | End: 2024-07-29

## 2024-06-29 RX ORDER — THIAMINE MONONITRATE (VIT B1) 100 MG
100 TABLET ORAL DAILY
Qty: 30 TABLET | Refills: 0 | Status: SHIPPED | OUTPATIENT
Start: 2024-06-29

## 2024-06-29 RX ORDER — FOLIC ACID 1 MG/1
1 TABLET ORAL DAILY
Qty: 30 TABLET | Refills: 0 | Status: SHIPPED | OUTPATIENT
Start: 2024-06-29

## 2024-06-29 RX ADMIN — FOLIC ACID 1 MG: 1 TABLET ORAL at 09:21

## 2024-06-29 RX ADMIN — Medication 100 MG: at 09:21

## 2024-06-29 RX ADMIN — POTASSIUM BICARBONATE 40 MEQ: 782 TABLET, EFFERVESCENT ORAL at 09:21

## 2024-06-29 RX ADMIN — METOPROLOL TARTRATE 12.5 MG: 25 TABLET, FILM COATED ORAL at 09:21

## 2024-06-29 NOTE — DISCHARGE INSTRUCTIONS
-Please follow-up with your PCP within 1 week  -Please take folic acid 1mg daily, thiamine 100mg daily, and multivitamins daily  -Please take all other medications as prescribed

## 2024-06-29 NOTE — PLAN OF CARE
Problem: Safety - Adult  Goal: Free from fall injury  Outcome: Progressing  Flowsheets (Taken 6/28/2024 1780)  Free From Fall Injury: Based on caregiver fall risk screen, instruct family/caregiver to ask for assistance with transferring infant if caregiver noted to have fall risk factors  Note: Patient is free from fall. Ensured all safety measures are in placed. Bed locked in lowest position, bed alarm on, gripper socks worn by patient, bedside table with patient's belongings and call light within reached.     Problem: Discharge Planning  Goal: Discharge to home or other facility with appropriate resources  Outcome: Progressing

## 2024-06-29 NOTE — PLAN OF CARE
Problem: Discharge Planning  Goal: Discharge to home or other facility with appropriate resources  6/29/2024 1145 by Sindy Orona LPN  Outcome: Adequate for Discharge  6/28/2024 2245 by Abdi Conway RN  Outcome: Progressing     Problem: Safety - Adult  Goal: Free from fall injury  6/29/2024 1145 by Sindy Orona LPN  Outcome: Adequate for Discharge  6/28/2024 2245 by Abdi Conway RN  Outcome: Progressing  Flowsheets (Taken 6/28/2024 2245)  Free From Fall Injury: Based on caregiver fall risk screen, instruct family/caregiver to ask for assistance with transferring infant if caregiver noted to have fall risk factors  Note: Patient is free from fall. Ensured all safety measures are in placed. Bed locked in lowest position, bed alarm on, gripper socks worn by patient, bedside table with patient's belongings and call light within reached.

## 2024-06-29 NOTE — PROGRESS NOTES
Internal Medicine Progress Note    Date: 6/29/2024   Patient: Bill Fish   [facility] Day: 2      CC: Alcohol Problem (Last drink 0530. Usually drinks a fifth of vodka a day. C/o nausea abd pain. )       Interval Hx   Patient seen and examined at bedside this morning. No acute events overnight.  Patient wants to leave AMA this morning as he is feeling better.  Patient denies any nausea/vomiting or abdominal pain.  Tolerating diet well and does not have any other acute complaints at this time.  Patient not requiring any Ativan overnight.  Likely discharge today.      HPI:   Bill Fish is a 44 year old male with PMHx of alcohol use disorder who presents for N/V for the past three days. Patient typically drinks a fifth of a vodka (a standard bottle of 750ml, 17 shots), last drink was at 5:30 AM on 6/27. Denied any withdrawal seizures. Withdrawal symptoms usually consist of tremors & N/V.     Objective     Vital Signs:  Patient Vitals for the past 8 hrs:   BP Temp Temp src Pulse Resp SpO2   06/29/24 0835 (!) 157/91 99.1 °F (37.3 °C) Oral (!) 107 18 97 %         Physical Exam  Constitutional:       Appearance: Normal appearance.   Cardiovascular:      Rate and Rhythm: Normal rate and regular rhythm.      Pulses: Normal pulses.      Heart sounds: Normal heart sounds.   Pulmonary:      Effort: Pulmonary effort is normal.      Breath sounds: Normal breath sounds.   Abdominal:      General: Abdomen is flat. Bowel sounds are normal. There is no distension.      Palpations: Abdomen is soft.      Tenderness: There is no abdominal tenderness.   Musculoskeletal:      Right lower leg: No edema.      Left lower leg: No edema.   Skin:     General: Skin is warm.   Neurological:      Mental Status: He is alert and oriented to person, place, and time.            Labs:  CBC:   Recent Labs     06/27/24  1536 06/28/24  0323 06/29/24  0332   WBC 5.1 2.9* 3.4*   HGB 10.8* 8.9* 9.7*   HCT 32.4* 26.1* 28.3*   * 73* 82* 
4 Eyes Admission Assessment     I agree as the admission nurse that 2 RN's have performed a thorough Head to Toe Skin Assessment on the patient. ALL assessment sites listed below have been assessed on admission.       Areas assessed by both nurses:   [x]   Head, Face, and Ears   [x]   Shoulders, Back, and Chest  [x]   Arms, Elbows, and Hands   [x]   Coccyx, Sacrum, and Ischum  [x]   Legs, Feet, and Heels        Does the Patient have Skin Breakdown?  No         Boo Prevention initiated:  Yes   Wound Care Orders initiated:  No      C nurse consulted for Pressure Injury (Stage 3,4, Unstageable, DTI, NWPT, and Complex wounds):  No      Nurse 1 eSignature: Electronically signed by Abdi Conway RN on 6/27/24 at 11:30 PM EDT    **SHARE this note so that the co-signing nurse is able to place an eSignature**    Nurse 2 eSignature: Electronically signed by Arya Chin RN on 6/28/24 at 1:43 AM EDT    
IV and tele monitor removed prior to discharge. Discussed discharge instructions with patient and patient understands to  new meds from [facility] and stated he will schedule rehab outpatient. An uber picked patient up and transported patient back home at 11am. All belongings sent with patient.     
Patient disconnected IV fluids and removed Tele monitor and stated that he was ready to go and didn't want to be here any longer. Resident notified of this and stated they will come up and speak with patient.   
Received call from lab at 0626 patient magnesium 0.90 mg/dl informed Yessy Phan MD and started with the PRN magnesium replacement.  
Spoke to Wanda Pereyra pharmacist, stated ok to run mag sulfate with D5LR  Electronically signed by Capri Jacques RN on 6/28/2024 at 12:56 PM    
32.4* 26.1*   * 73*       BMP:   Recent Labs     06/27/24  1536 06/28/24  0323    132*   K 4.3 3.5   CL 84* 88*   CO2 24 30   BUN 11 11   CREATININE 1.3 1.3   GLUCOSE 88 144*     Magnesium:   Recent Labs     06/28/24  0323   MG 0.90*     LFT's:   Recent Labs     06/27/24  1536 06/28/24  0323   * 128*   ALT 77* 56*   BILITOT 1.8* 1.2*   ALKPHOS 260* 201*       Radiology:  CT ABDOMEN PELVIS W IV CONTRAST Additional Contrast? None   Final Result      Severe hepatic steatosis.         Electronically signed by Rashawn Cameron MD            Assessment & Plan   Bill Fish is a 44 year old male with PMHx of alcohol use disorder, HTN who presents for N/V for the past three days    Emesis in setting of Alcohol withdrawal   Patient presents with N/V and diffuse abdominal pain for the past three days. Pt had prior admissions with similar symptoms. Pt drinks a fifth of a vodka, a standard bottle of 750ml, 17 shots.  LFTs: , ALT 77, . Lipase 69. CT abdomen showed hepatic steatosis.   - CIWA w/ ativan  - IVF w/ D5W normal saline   - continue thiamine 100 mg & folic acid   - monitor on telemetry  - seizure precautions  - Social work consulted, appreciate recs  - Monitor RFP & Mg  - encourage PO intake     Acute Macrocytic anemia   Patient has alcohol use disorder. Hgb 10.8, Hct 32.4. .   - monitor CBC   - monitor on telemetry  - continue folic acid    Hypertension  - continue home metoprolol 12.5 BID     DVT PPx: lovenox   Diet: ADULT DIET; Regular   Code status:  Full Code     ELOS: 3  Barriers to discharge: Alcohol withdrawal   Disposition  - Preadmission: Home  - Current: [facility]   - Upon discharge: TBD    Will discuss with attending physician Dr. Brantley, Estefany MICHELE MD     _____________________  Ayah Peralta MD   Internal Medicine Resident, PGY-1

## 2024-06-29 NOTE — CARE COORDINATION
Pt reports he will return home a discharge and has met with Mai Melendez and will attend IOP services at [facility] at discharge. Mai Melendez will continue to follow pt at discharge. Pt taking Lyft home at discharge. Pt is active with [facility] for primary care services. Pt reports no other CM needs for discharge. Electronically signed by Mansi Marrero RN on 6/29/2024 at 10:08 AM

## 2024-07-02 NOTE — DISCHARGE SUMMARY
INTERNAL MEDICINE DEPARTMENT AT THE [facility]  DISCHARGE SUMMARY    Patient ID: Bill Fish                                             Discharge Date: 6/29/2024  Patient's PCP: Dominic Manuel MD                                          Discharge Physician: Mariah Meehan MD  Admit Date: 6/27/2024   Admitting Physician: Bismark Salcido MD    DISCHARGE DIAGNOSES:  - Emesis in setting of Alcohol withdrawal     Hospital Course:      Bill Fish is a 44 y.o. male with PMHx of alcohol use disorder who presented on 6/27 for N/V for three days. Patient stated that he typically drinks a fifth of a vodka (a standard bottle of 750ml, 17 shots), last drink was at 5:30 AM on 6/27. Denied any withdrawal seizures. Patient stated that his nausea and vomiting are severe enough that he was unable to drink any more alcohol for which she presented to the ED.  On presentation, CT abdomen pelvis showed severe hepatic steatosis.  Patient was admitted for emesis in setting of alcohol withdrawal and started on CIWA with Ativan.  On 6/29 patient wanted to leave AMA.  Patient's nausea and vomiting improved and he was able to tolerate diet.  Given that patient did not require any Ativan the night prior and had improvement of symptoms he was discharged home and stated that he will attend IOP services at [facility] at discharge.  He was discharged on 1 mg folic acid, 100 mg thiamine and multivitamins. Recommend outpatient follow-up with PCP.    Physical Exam:  BP (!) 157/91   Pulse (!) 107   Temp 99.1 °F (37.3 °C) (Oral)   Resp 18   Ht 1.753 m (5' 9.02\")   Wt 75.6 kg (166 lb 10.7 oz)   SpO2 97%   BMI 24.60 kg/m²   General appearance: alert, appears stated age and cooperative  Head: Normocephalic, without obvious abnormality, atraumatic  Eyes: conjunctivae/corneas clear. PERRL, EOM's intact. Fundi benign.  Ears: normal TM's and external ear canals both ears  Nose: Nares normal. Septum midline. Mucosa normal. No

## 2024-07-18 DIAGNOSIS — I10 HYPERTENSION, UNSPECIFIED TYPE: Primary | ICD-10-CM

## 2024-07-18 DIAGNOSIS — I10 HYPERTENSION, UNSPECIFIED TYPE: ICD-10-CM

## 2024-07-18 RX ORDER — METOPROLOL TARTRATE 25 MG/1
TABLET, FILM COATED ORAL
Qty: 90 TABLET | Refills: 3 | OUTPATIENT
Start: 2024-07-18

## 2024-07-18 NOTE — PROGRESS NOTES
Received refill request for metoprolol tartrate. Presumably taking for hypertension per notes, though medication review states patient has not been taking it.    Prescribed 7 day supply of metoprolol tartrate 12.5mg BID. Will reach out to patient and communicate that patient needs follow-up with us in order to continue medication prescriptions.

## 2024-07-24 RX ORDER — FOLIC ACID 1 MG/1
1000 TABLET ORAL DAILY
Qty: 90 TABLET | Refills: 1 | OUTPATIENT
Start: 2024-07-24

## 2024-08-16 ENCOUNTER — OFFICE VISIT (OUTPATIENT)
Dept: INTERNAL MEDICINE CLINIC | Age: 44
End: 2024-08-16
Payer: MEDICAID

## 2024-08-16 VITALS
WEIGHT: 185.1 LBS | RESPIRATION RATE: 20 BRPM | BODY MASS INDEX: 27.42 KG/M2 | DIASTOLIC BLOOD PRESSURE: 83 MMHG | OXYGEN SATURATION: 92 % | SYSTOLIC BLOOD PRESSURE: 134 MMHG | TEMPERATURE: 97.3 F | HEART RATE: 92 BPM | HEIGHT: 69 IN

## 2024-08-16 DIAGNOSIS — D64.9 ANEMIA, UNSPECIFIED TYPE: Primary | ICD-10-CM

## 2024-08-16 DIAGNOSIS — K76.0 HEPATIC STEATOSIS: ICD-10-CM

## 2024-08-16 DIAGNOSIS — I10 HYPERTENSION, UNSPECIFIED TYPE: ICD-10-CM

## 2024-08-16 PROCEDURE — 99213 OFFICE O/P EST LOW 20 MIN: CPT

## 2024-08-16 RX ORDER — FOLIC ACID 1 MG/1
1 TABLET ORAL DAILY
Qty: 30 TABLET | Refills: 0 | Status: SHIPPED | OUTPATIENT
Start: 2024-08-16

## 2024-08-16 RX ORDER — M-VIT,TX,IRON,MINS/CALC/FOLIC 27MG-0.4MG
1 TABLET ORAL DAILY
Qty: 30 TABLET | Refills: 0 | Status: SHIPPED | OUTPATIENT
Start: 2024-08-16

## 2024-08-16 ASSESSMENT — PATIENT HEALTH QUESTIONNAIRE - PHQ9
SUM OF ALL RESPONSES TO PHQ QUESTIONS 1-9: 0
SUM OF ALL RESPONSES TO PHQ QUESTIONS 1-9: 0
2. FEELING DOWN, DEPRESSED OR HOPELESS: NOT AT ALL
SUM OF ALL RESPONSES TO PHQ9 QUESTIONS 1 & 2: 0
SUM OF ALL RESPONSES TO PHQ QUESTIONS 1-9: 0
1. LITTLE INTEREST OR PLEASURE IN DOING THINGS: NOT AT ALL
SUM OF ALL RESPONSES TO PHQ QUESTIONS 1-9: 0

## 2024-08-16 ASSESSMENT — ENCOUNTER SYMPTOMS
ALLERGIC/IMMUNOLOGIC NEGATIVE: 1
RESPIRATORY NEGATIVE: 1
EYES NEGATIVE: 1
GASTROINTESTINAL NEGATIVE: 1

## 2024-08-16 NOTE — PATIENT INSTRUCTIONS
Please get the labs (copper, Vit B12 and hepatic function panel   Please continue taking your vitamins   Please continue taking your medication for hypertension

## 2024-08-16 NOTE — PROGRESS NOTES
intake      The patient was staffed with teaching attending: Dr. Hari Bejarano.    An electronic signature was used to authenticate this note.    Marge Andrew  Internal Medicine, PGY-1    Addendum to Resident H& P/Progress note:  I have personally seen,examined and evaluated the patient. I have reviewed the current history, physical findings, labs and assessment and plan and agree with note as documented by resident MD ( )      Hari Bejarano MD, FACP

## 2024-09-12 RX ORDER — FOLIC ACID 1 MG/1
1000 TABLET ORAL DAILY
Qty: 30 TABLET | Refills: 5 | Status: SHIPPED | OUTPATIENT
Start: 2024-09-12

## 2024-10-31 NOTE — ED PROVIDER NOTES
810 Replaced by [facility] 71 ENCOUNTER          ATTENDING PHYSICIAN NOTE       Date of evaluation: 8/5/2020    Chief Complaint     Other (alcohol withdrawal)      History of Present Illness     Kathrine Cuevas is a 47 y.o. male who presents to the emergency department for alcohol withdrawal.  Patient states he has a history of alcohol abuse and drinks approximately a fifth of alcohol daily. He states that he went to an outpatient rehab center yesterday and that was his last drink, which was approximately 18 hours prior to presentation. He states that they gave him a medication to help him with cravings though he does not know the name of it and they told him if he went into withdrawals he would need to go to the hospital to be evaluated. Patient states he is been having nausea and vomiting. He does note a mild headache. He denies any seizures. He denies auditory or visual hallucinations. He denies any history of alcohol withdrawal seizures or DTs in the past.    Review of Systems     Review of Systems   Constitutional: Negative. HENT: Negative. Eyes: Negative. Respiratory: Negative. Cardiovascular: Negative. Gastrointestinal: Positive for nausea and vomiting. Negative for abdominal pain, constipation and diarrhea. Genitourinary: Negative. Musculoskeletal: Negative. Neurological: Positive for headaches. Negative for weakness and numbness. All other systems reviewed and are negative. Past Medical, Surgical, Family, and Social History     He has a past medical history of Hypertension. He has no past surgical history on file. His family history is not on file. He reports that he has been smoking e-cigarettes. He has been smoking about 0.00 packs per day. He has never used smokeless tobacco. He reports current alcohol use. He reports that he does not use drugs.     Medications     Previous Medications    CARVEDILOL (COREG) 6.25 MG TABLET    Take 6.25 mg by mouth 2 Conner Ochoa is a 52 year old, presenting for the following health issues:  Chest Pain (Pain that comes and goes)    History of Present Illness       Reason for visit:  Center chest pain    He eats 0-1 servings of fruits and vegetables daily.He consumes 0 sweetened beverage(s) daily.He exercises with enough effort to increase his heart rate 30 to 60 minutes per day.  He exercises with enough effort to increase his heart rate 4 days per week.   He is taking medications regularly.         Current Medications:     Current Outpatient Medications   Medication Sig Dispense Refill    blood glucose (ACCU-CHEK URIEL PLUS) test strip USE TO TEST BLOOD SUGAR FOUR TIMES DAILY OR AS DIRECTED 150 strip 0    Continuous Glucose Sensor (DEXCOM G7 SENSOR) MISC Change every 10 days. 3 each 5    fluticasone (FLONASE) 50 MCG/ACT nasal spray Spray 2 sprays into both nostrils daily 32 g 11    insulin lispro (HUMALOG VIAL) 100 UNIT/ML vial Use with insulin pump, total daily dose approx 120 units 40 mL 5    insulin pen needle (B-D U/F) 31G X 5 MM miscellaneous Use 1 pen needles daily or as directed. 100 each 3    INSULIN PUMP - OUTPATIENT Date Last Updated: 02/16/22  Tandem X2 with control IQ and Dexcom  BASAL RATES and times:  12am: (midnight): 1.10 units/hour    3am:1.5  5 am: 1.5 units/hour   8am: 2.05  12pm (noon): 1.9 units/hour   4pm: 1.45 units/hour   8 pm: 1.75 units/hour   CARB RATIO and times:  12   AM (midnight): 2.8  5am: 2.5  12  PM (noon):  2.2  8    PM:  2.7  Corection Factor (Sensitivity) and times:  12   AM (midnight): 15 mg/dL  BG target 125 mg/dL  Active Insulin Time: 4 hours      losartan (COZAAR) 25 MG tablet TAKE 1 TABLET(25 MG) BY MOUTH DAILY 90 tablet 3    sildenafil (VIAGRA) 100 MG tablet TAKE 1/2 TABLET BY MOUTH 30 MINUTES PRIOR TO INTERCOURSE AS DIRECTED 15 tablet 0    tirzepatide (MOUNJARO) 7.5 MG/0.5ML pen Inject 7.5 mg subcutaneously every 7 days. 2 mL 1    blood glucose monitoring (SOFTCLIX) lancets TAKE  AS DIRECTED FOUR TIMES DAILY (Patient not taking: Reported on 10/31/2024) 400 each 11    Continuous Blood Gluc  (DEXCOM G6 ) TESHA Use to read blood sugars as per 's instructions. (Patient not taking: Reported on 10/31/2024) 1 each 0    Continuous Blood Gluc Sensor (DEXCOM G6 SENSOR) MISC CHANGE EVERY 10 DAYS. (Patient not taking: Reported on 10/31/2024) 9 each 3    Continuous Blood Gluc Sensor (DEXCOM G6 SENSOR) MISC Change every 10 days. (Patient not taking: Reported on 10/31/2024) 9 each 3    Continuous Glucose Transmitter (DEXCOM G6 TRANSMITTER) MISC CHANGE EVERY 90 DAYS (Patient not taking: Reported on 10/31/2024) 1 each 1         Allergies:      Allergies   Allergen Reactions    Cats     Seasonal Allergies      grass            Past Medical History:     Past Medical History:   Diagnosis Date    Cataracts, bilateral     Diabetes mellitus type 1.5 (H)     DKA (diabetic ketoacidoses)     Keratoconus of both eyes     Morbid obesity (H)     PHUONG (obstructive sleep apnea)          Past Surgical History:     Past Surgical History:   Procedure Laterality Date    COLONOSCOPY      ENT SURGERY  10 years ago    cyst on neck    EXTRACTION(S) DENTAL      NECK SURGERY  2010    to remove a cyst    PHACOEMULSIFICATION CLEAR CORNEA WITH STANDARD INTRAOCULAR LENS IMPLANT Left 09/21/2020    Procedure: COMPLEX PHACOEMULSIFICATION, CATARACT, WITH INTRAOCULAR LENS IMPLANT;  Surgeon: Andre Jackson MD;  Location: [facility] OR    PHACOEMULSIFICATION CLEAR CORNEA WITH STANDARD INTRAOCULAR LENS IMPLANT Right 09/28/2020    Procedure: PHACOEMULSIFICATION, CATARACT, WITH INTRAOCULAR LENS IMPLANT;  Surgeon: Andre Jackson MD;  Location: [facility] OR    VASECTOMY           Family Medical History:     Family History   Problem Relation Age of Onset    Diabetes Maternal Grandmother     Other Cancer Maternal Grandmother         Lung Cancer    Diabetes Paternal Grandmother     Glaucoma Brother     Hypertension Mother      times daily (with meals)    FAMOTIDINE (PEPCID) 20 MG TABLET    Take 1 tablet by mouth 2 times daily       Allergies     He is allergic to banana and tree nut  [macadamia nut oil]. Physical Exam     INITIAL VITALS: BP: (!) 168/112, Temp: 98 °F (36.7 °C), Pulse: 82, Resp: 16, SpO2: 98 %   Physical Exam  Vitals signs and nursing note reviewed. Constitutional:       General: He is not in acute distress. Appearance: He is not diaphoretic. HENT:      Head: Normocephalic and atraumatic. Mouth/Throat:      Mouth: Mucous membranes are moist.      Pharynx: No oropharyngeal exudate. Eyes:      General: No scleral icterus. Extraocular Movements: Extraocular movements intact. Conjunctiva/sclera: Conjunctivae normal.   Neck:      Musculoskeletal: Normal range of motion and neck supple. Cardiovascular:      Rate and Rhythm: Normal rate and regular rhythm. Heart sounds: Normal heart sounds. Pulmonary:      Effort: Pulmonary effort is normal.      Breath sounds: Normal breath sounds. No wheezing, rhonchi or rales. Abdominal:      General: Bowel sounds are normal.      Palpations: Abdomen is soft. Tenderness: There is no abdominal tenderness. There is no guarding or rebound. Musculoskeletal: Normal range of motion. General: No swelling. Skin:     General: Skin is warm and dry. Neurological:      General: No focal deficit present. Mental Status: He is alert and oriented to person, place, and time. Cranial Nerves: No cranial nerve deficit. Motor: Tremor present. No weakness. Coordination: Coordination normal.      Comments: Fine tremor present on exam.  No asterixis present.          Diagnostic Results     LABS:   Results for orders placed or performed during the hospital encounter of 08/05/20   CBC auto differential   Result Value Ref Range    WBC 3.8 (L) 4.0 - 11.0 K/uL    RBC 3.65 (L) 4.20 - 5.90 M/uL    Hemoglobin 12.6 (L) 13.5 - 17.5 g/dL    Hematocrit 37.2 (L) 40.5 - 52.5 %    .8 (H) 80.0 - 100.0 fL    MCH 34.5 (H) 26.0 - 34.0 pg    MCHC 33.9 31.0 - 36.0 g/dL    RDW 15.3 12.4 - 15.4 %    Platelets 540 107 - 367 K/uL    MPV 7.0 5.0 - 10.5 fL    Neutrophils % 55.9 %    Lymphocytes % 31.8 %    Monocytes % 9.9 %    Eosinophils % 1.6 %    Basophils % 0.8 %    Neutrophils Absolute 2.1 1.7 - 7.7 K/uL    Lymphocytes Absolute 1.2 1.0 - 5.1 K/uL    Monocytes Absolute 0.4 0.0 - 1.3 K/uL    Eosinophils Absolute 0.1 0.0 - 0.6 K/uL    Basophils Absolute 0.0 0.0 - 0.2 K/uL   Basic Metabolic Panel (EP - 1)   Result Value Ref Range    Sodium 139 136 - 145 mmol/L    Potassium 3.9 3.5 - 5.1 mmol/L    Chloride 96 (L) 99 - 110 mmol/L    CO2 20 (L) 21 - 32 mmol/L    Anion Gap 23 (H) 3 - 16    Glucose 121 (H) 70 - 99 mg/dL    BUN 10 7 - 20 mg/dL    CREATININE 1.0 0.9 - 1.3 mg/dL    GFR Non-African American >60 >60    GFR African American >60 >60    Calcium 9.7 8.3 - 10.6 mg/dL   Magnesium   Result Value Ref Range    Magnesium 1.90 1.80 - 2.40 mg/dL   Phosphorus   Result Value Ref Range    Phosphorus 3.4 2.5 - 4.9 mg/dL   Hepatic function panel (LFTs)   Result Value Ref Range    Total Protein 8.4 (H) 6.4 - 8.2 g/dL    Alb 5.1 (H) 3.4 - 5.0 g/dL    Alkaline Phosphatase 94 40 - 129 U/L    ALT 78 (H) 10 - 40 U/L     (H) 15 - 37 U/L    Total Bilirubin 0.6 0.0 - 1.0 mg/dL    Bilirubin, Direct <0.2 0.0 - 0.3 mg/dL    Bilirubin, Indirect see below 0.0 - 1.0 mg/dL   Lipase   Result Value Ref Range    Lipase 72.0 (H) 13.0 - 60.0 U/L   Ethanol   Result Value Ref Range    Ethanol Lvl 73 mg/dL     RECENT VITALS:  BP: (!) 145/92,Temp: 98 °F (36.7 °C), Pulse: 86, Resp: 13, SpO2: 96 %     Procedures     N/A    ED Course     Nursing Notes, Past Medical Hx, Past Surgical Hx, Social Hx,Allergies, and Family Hx were reviewed.     patient was given the following medications:  Orders Placed This Encounter   Medications    sodium chloride 0.9 % 50 mL with folic acid 1 mg, adult multi-vitamin Diabetes Other         Maternal aunt mother of vibha t1    Diabetes Cousin     Diabetes Daughter         T1 Dx 7/4/2021    Macular Degeneration No family hx of          Social History:     Social History     Socioeconomic History    Marital status: Single     Spouse name: Not on file    Number of children: Not on file    Years of education: Not on file    Highest education level: Not on file   Occupational History    Not on file   Tobacco Use    Smoking status: Never    Smokeless tobacco: Never   Substance and Sexual Activity    Alcohol use: Never    Drug use: Never    Sexual activity: Yes     Partners: Female     Birth control/protection: Female Surgical   Other Topics Concern    Parent/sibling w/ CABG, MI or angioplasty before 65F 55M? No   Social History Narrative    Not on file     Social Drivers of Health     Financial Resource Strain: Low Risk  (6/5/2024)    Financial Resource Strain     Within the past 12 months, have you or your family members you live with been unable to get utilities (heat, electricity) when it was really needed?: No   Food Insecurity: Low Risk  (6/5/2024)    Food Insecurity     Within the past 12 months, did you worry that your food would run out before you got money to buy more?: No     Within the past 12 months, did the food you bought just not last and you didn t have money to get more?: No   Transportation Needs: Low Risk  (6/5/2024)    Transportation Needs     Within the past 12 months, has lack of transportation kept you from medical appointments, getting your medicines, non-medical meetings or appointments, work, or from getting things that you need?: No   Physical Activity: Insufficiently Active (6/5/2024)    Exercise Vital Sign     Days of Exercise per Week: 2 days     Minutes of Exercise per Session: 30 min   Stress: No Stress Concern Present (6/5/2024)    [place] [employer] - Occupational Stress Questionnaire     Feeling of Stress : Not at all   Social  "Connections: Unknown (6/5/2024)    Social Connection and Isolation Panel [NHANES]     Frequency of Communication with Friends and Family: Not on file     Frequency of Social Gatherings with Friends and Family: Never     Attends Jehovah's witness Services: Not on file     Active Member of Clubs or Organizations: Not on file     Attends Club or Organization Meetings: Not on file     Marital Status: Not on file   Interpersonal Safety: Low Risk  (2/28/2024)    Interpersonal Safety     Do you feel physically and emotionally safe where you currently live?: Yes     Within the past 12 months, have you been hit, slapped, kicked or otherwise physically hurt by someone?: No     Within the past 12 months, have you been humiliated or emotionally abused in other ways by your partner or ex-partner?: No   Housing Stability: Low Risk  (6/5/2024)    Housing Stability     Do you have housing? : Yes     Are you worried about losing your housing?: No           Review of System:     Constitutional: Negative for fever or chills  Skin: Negative for rashes  Ears/Nose/Throat: Negative for nasal congestion, sore throat  Respiratory: No shortness of breath, dyspnea on exertion, cough, or hemoptysis  Cardiovascular: positive for recent intermittent exertional chest pains  Gastrointestinal: Negative for nausea, vomiting  Genitourinary: Negative for dysuria, hematuria  Musculoskeletal: Negative for myalgias  Neurologic: Negative for headaches  Psychiatric: Negative for depression, anxiety  Hematologic/Lymphatic/Immunologic: Negative  Endocrine: positive for diabetes  Behavioral: Negative for tobacco use       Physical Exam:   /85 (BP Location: Right arm, Patient Position: Sitting, Cuff Size: Adult Large)   Pulse 72   Temp 98.6  F (37  C)   Resp 16   Ht 1.702 m (5' 7\")   Wt 139.3 kg (307 lb)   SpO2 98%   BMI 48.08 kg/m      GENERAL: alert and no distress  EYES: eyes grossly normal to inspection, and conjunctivae and sclerae normal  HENT: " Normocephalic atraumatic. Nose and mouth without ulcers or lesions  NECK: supple  RESP: lungs clear to auscultation   CV: regular rate and rhythm, normal S1 S2  LYMPH: no peripheral edema   ABDOMEN: nondistended  MS: no gross musculoskeletal defects noted  SKIN: no suspicious lesions or rashes  NEURO: Alert & Oriented x 3.   PSYCH: mentation appears normal, affect normal        Diagnostic Test Results:     Diagnostic Test Results:  Labs reviewed in Epic    ASSESSMENT/PLAN:       Don was seen today for chest pain.    Diagnoses and all orders for this visit:    Chest pain, unspecified type  - positive for recent intermittent exertional chest pains  -     Adult Cardiology Eval [facility] Referral; Future    Hyperlipidemia LDL goal <100  - stable, continue current therapy    Essential hypertension  - stable, BP is at goal  - continue current therapy    Diabetes 1.5, managed as type 2 (H)  - stable, no recent hypoglycemia  - continue current therapy          Follow Up Plan:     Patient is instructed to return to Internal Medicine clinic for follow-up visit in 1 month.        Radha Milligan MD  Internal Medicine  [facility]    The longitudinal plan of care for the diagnosis(es)/condition(s) as documented were addressed during this visit. Due to the added complexity in care, I will continue to support this patient in the subsequent management and with ongoing continuity of care.     Signed Electronically by: Radha Milligan MD     care provider            DISCHARGE MEDICATIONS:  New Prescriptions    DIAZEPAM (VALIUM) 5 MG TABLET    Take 2 tablets by mouth 3 times daily for 3 days, then 2 tablets twice daily for 3 days, then 2 tablets once daily for 3 days, then 1 tablet once daily for 3 days.     ONDANSETRON (ZOFRAN ODT) 4 MG DISINTEGRATING TABLET    Take 1 tablet by mouth every 8 hours as needed for Nausea       DISPOSITION          Liv Martines MD  08/05/20 0462

## 2025-02-11 ENCOUNTER — HOSPITAL ENCOUNTER (INPATIENT)
Age: 45
LOS: 1 days | Discharge: LEFT BEFORE DISCHARGE | DRG: 426 | End: 2025-02-12
Attending: EMERGENCY MEDICINE | Admitting: INTERNAL MEDICINE
Payer: MEDICAID

## 2025-02-11 DIAGNOSIS — E87.1 HYPONATREMIA: Primary | ICD-10-CM

## 2025-02-11 DIAGNOSIS — N17.9 ACUTE KIDNEY INJURY: ICD-10-CM

## 2025-02-11 DIAGNOSIS — R11.2 NAUSEA AND VOMITING, UNSPECIFIED VOMITING TYPE: ICD-10-CM

## 2025-02-11 LAB
ALBUMIN SERPL-MCNC: 4.3 G/DL (ref 3.4–5)
ALBUMIN SERPL-MCNC: 4.7 G/DL (ref 3.4–5)
ALBUMIN/GLOB SERPL: 1.4 {RATIO} (ref 1.1–2.2)
ALP SERPL-CCNC: 87 U/L (ref 40–129)
ALP SERPL-CCNC: 99 U/L (ref 40–129)
ALT SERPL-CCNC: 80 U/L (ref 10–40)
ALT SERPL-CCNC: 92 U/L (ref 10–40)
ANION GAP SERPL CALCULATED.3IONS-SCNC: 6 MMOL/L (ref 3–16)
ANION GAP SERPL CALCULATED.3IONS-SCNC: 6 MMOL/L (ref 3–16)
AST SERPL-CCNC: 107 U/L (ref 15–37)
AST SERPL-CCNC: 92 U/L (ref 15–37)
BASOPHILS # BLD: 0 K/UL (ref 0–0.2)
BASOPHILS NFR BLD: 0.2 %
BILIRUB DIRECT SERPL-MCNC: 0.5 MG/DL (ref 0–0.3)
BILIRUB INDIRECT SERPL-MCNC: 1 MG/DL (ref 0–1)
BILIRUB SERPL-MCNC: 1.2 MG/DL (ref 0–1)
BILIRUB SERPL-MCNC: 1.5 MG/DL (ref 0–1)
BUN SERPL-MCNC: 35 MG/DL (ref 7–20)
BUN SERPL-MCNC: 36 MG/DL (ref 7–20)
CALCIUM SERPL-MCNC: 9 MG/DL (ref 8.3–10.6)
CALCIUM SERPL-MCNC: 9.9 MG/DL (ref 8.3–10.6)
CHLORIDE SERPL-SCNC: <60 MMOL/L (ref 99–110)
CHLORIDE SERPL-SCNC: <60 MMOL/L (ref 99–110)
CO2 SERPL-SCNC: 38 MMOL/L (ref 21–32)
CO2 SERPL-SCNC: 39 MMOL/L (ref 21–32)
CREAT SERPL-MCNC: 2.3 MG/DL (ref 0.9–1.3)
CREAT SERPL-MCNC: 2.5 MG/DL (ref 0.9–1.3)
DEPRECATED RDW RBC AUTO: 15.3 % (ref 12.4–15.4)
EOSINOPHIL # BLD: 0 K/UL (ref 0–0.6)
EOSINOPHIL NFR BLD: 0.1 %
FLUAV RNA RESP QL NAA+PROBE: NOT DETECTED
FLUBV RNA RESP QL NAA+PROBE: NOT DETECTED
GFR SERPLBLD CREATININE-BSD FMLA CKD-EPI: 32 ML/MIN/{1.73_M2}
GFR SERPLBLD CREATININE-BSD FMLA CKD-EPI: 35 ML/MIN/{1.73_M2}
GLUCOSE SERPL-MCNC: 108 MG/DL (ref 70–99)
GLUCOSE SERPL-MCNC: 128 MG/DL (ref 70–99)
HCT VFR BLD AUTO: 36.6 % (ref 40.5–52.5)
HGB BLD-MCNC: 13 G/DL (ref 13.5–17.5)
LIPASE SERPL-CCNC: 22 U/L (ref 13–60)
LYMPHOCYTES # BLD: 0.7 K/UL (ref 1–5.1)
LYMPHOCYTES NFR BLD: 16.3 %
MAGNESIUM SERPL-MCNC: 1.63 MG/DL (ref 1.8–2.4)
MAGNESIUM SERPL-MCNC: 1.79 MG/DL (ref 1.8–2.4)
MCH RBC QN AUTO: 30.1 PG (ref 26–34)
MCHC RBC AUTO-ENTMCNC: 35.5 G/DL (ref 31–36)
MCV RBC AUTO: 84.9 FL (ref 80–100)
MONOCYTES # BLD: 1.1 K/UL (ref 0–1.3)
MONOCYTES NFR BLD: 24.6 %
NEUTROPHILS # BLD: 2.5 K/UL (ref 1.7–7.7)
NEUTROPHILS NFR BLD: 58.8 %
PLATELET # BLD AUTO: 257 K/UL (ref 135–450)
PMV BLD AUTO: 8.3 FL (ref 5–10.5)
POTASSIUM SERPL-SCNC: 3.1 MMOL/L (ref 3.5–5.1)
POTASSIUM SERPL-SCNC: 3.2 MMOL/L (ref 3.5–5.1)
PROT SERPL-MCNC: 7.4 G/DL (ref 6.4–8.2)
PROT SERPL-MCNC: 8.4 G/DL (ref 6.4–8.2)
RBC # BLD AUTO: 4.31 M/UL (ref 4.2–5.9)
SARS-COV-2 RNA RESP QL NAA+PROBE: NOT DETECTED
SODIUM SERPL-SCNC: 104 MMOL/L (ref 136–145)
SODIUM SERPL-SCNC: 105 MMOL/L (ref 136–145)
WBC # BLD AUTO: 4.3 K/UL (ref 4–11)

## 2025-02-11 PROCEDURE — 83690 ASSAY OF LIPASE: CPT

## 2025-02-11 PROCEDURE — 2580000003 HC RX 258: Performed by: PHYSICIAN ASSISTANT

## 2025-02-11 PROCEDURE — 80061 LIPID PANEL: CPT

## 2025-02-11 PROCEDURE — 81001 URINALYSIS AUTO W/SCOPE: CPT

## 2025-02-11 PROCEDURE — 2000000000 HC ICU R&B

## 2025-02-11 PROCEDURE — 85025 COMPLETE CBC W/AUTO DIFF WBC: CPT

## 2025-02-11 PROCEDURE — 84133 ASSAY OF URINE POTASSIUM: CPT

## 2025-02-11 PROCEDURE — 80053 COMPREHEN METABOLIC PANEL: CPT

## 2025-02-11 PROCEDURE — 6360000002 HC RX W HCPCS: Performed by: PHYSICIAN ASSISTANT

## 2025-02-11 PROCEDURE — 84300 ASSAY OF URINE SODIUM: CPT

## 2025-02-11 PROCEDURE — 84100 ASSAY OF PHOSPHORUS: CPT

## 2025-02-11 PROCEDURE — 96374 THER/PROPH/DIAG INJ IV PUSH: CPT

## 2025-02-11 PROCEDURE — 87636 SARSCOV2 & INF A&B AMP PRB: CPT

## 2025-02-11 PROCEDURE — 82436 ASSAY OF URINE CHLORIDE: CPT

## 2025-02-11 PROCEDURE — 99285 EMERGENCY DEPT VISIT HI MDM: CPT

## 2025-02-11 PROCEDURE — 82077 ASSAY SPEC XCP UR&BREATH IA: CPT

## 2025-02-11 PROCEDURE — 83935 ASSAY OF URINE OSMOLALITY: CPT

## 2025-02-11 PROCEDURE — 83735 ASSAY OF MAGNESIUM: CPT

## 2025-02-11 PROCEDURE — 83930 ASSAY OF BLOOD OSMOLALITY: CPT

## 2025-02-11 PROCEDURE — 6370000000 HC RX 637 (ALT 250 FOR IP): Performed by: PHYSICIAN ASSISTANT

## 2025-02-11 RX ORDER — ONDANSETRON 2 MG/ML
8 INJECTION INTRAMUSCULAR; INTRAVENOUS ONCE
Status: COMPLETED | OUTPATIENT
Start: 2025-02-11 | End: 2025-02-11

## 2025-02-11 RX ORDER — 0.9 % SODIUM CHLORIDE 0.9 %
1000 INTRAVENOUS SOLUTION INTRAVENOUS ONCE
Status: COMPLETED | OUTPATIENT
Start: 2025-02-11 | End: 2025-02-11

## 2025-02-11 RX ORDER — POTASSIUM CHLORIDE 1500 MG/1
40 TABLET, EXTENDED RELEASE ORAL ONCE
Status: COMPLETED | OUTPATIENT
Start: 2025-02-12 | End: 2025-02-11

## 2025-02-11 RX ORDER — MAGNESIUM SULFATE IN WATER 40 MG/ML
2000 INJECTION, SOLUTION INTRAVENOUS ONCE
Status: COMPLETED | OUTPATIENT
Start: 2025-02-12 | End: 2025-02-12

## 2025-02-11 RX ADMIN — SODIUM CHLORIDE 1000 ML: 9 INJECTION, SOLUTION INTRAVENOUS at 21:45

## 2025-02-11 RX ADMIN — POTASSIUM CHLORIDE 40 MEQ: 1500 TABLET, EXTENDED RELEASE ORAL at 23:59

## 2025-02-11 RX ADMIN — ONDANSETRON 8 MG: 2 INJECTION, SOLUTION INTRAMUSCULAR; INTRAVENOUS at 21:46

## 2025-02-11 ASSESSMENT — ENCOUNTER SYMPTOMS
ABDOMINAL PAIN: 0
NAUSEA: 1
DIARRHEA: 0
CONSTIPATION: 0
VOMITING: 1
BLOOD IN STOOL: 0

## 2025-02-11 ASSESSMENT — PAIN - FUNCTIONAL ASSESSMENT: PAIN_FUNCTIONAL_ASSESSMENT: NONE - DENIES PAIN

## 2025-02-12 VITALS
HEIGHT: 69 IN | HEART RATE: 90 BPM | BODY MASS INDEX: 27.59 KG/M2 | DIASTOLIC BLOOD PRESSURE: 74 MMHG | OXYGEN SATURATION: 99 % | RESPIRATION RATE: 16 BRPM | TEMPERATURE: 98.9 F | SYSTOLIC BLOOD PRESSURE: 119 MMHG | WEIGHT: 186.29 LBS

## 2025-02-12 PROBLEM — R11.2 NAUSEA AND VOMITING: Status: ACTIVE | Noted: 2025-02-12

## 2025-02-12 LAB
ALBUMIN SERPL-MCNC: 4.2 G/DL (ref 3.4–5)
ALBUMIN SERPL-MCNC: 4.2 G/DL (ref 3.4–5)
AMPHETAMINES UR QL SCN>1000 NG/ML: NORMAL
ANION GAP SERPL CALCULATED.3IONS-SCNC: 13 MMOL/L (ref 3–16)
ANION GAP SERPL CALCULATED.3IONS-SCNC: 16 MMOL/L (ref 3–16)
ANION GAP SERPL CALCULATED.3IONS-SCNC: 8 MMOL/L (ref 3–16)
ANION GAP SERPL CALCULATED.3IONS-SCNC: 9 MMOL/L (ref 3–16)
ANISOCYTOSIS BLD QL SMEAR: ABNORMAL
BARBITURATES UR QL SCN>200 NG/ML: NORMAL
BASE EXCESS BLDV CALC-SCNC: 17.9 MMOL/L (ref -2–3)
BASOPHILS # BLD: 0 K/UL (ref 0–0.2)
BASOPHILS NFR BLD: 0 %
BENZODIAZ UR QL SCN>200 NG/ML: NORMAL
BILIRUB UR QL STRIP.AUTO: NEGATIVE
BUN SERPL-MCNC: 29 MG/DL (ref 7–20)
BUN SERPL-MCNC: 30 MG/DL (ref 7–20)
BUN SERPL-MCNC: 31 MG/DL (ref 7–20)
BUN SERPL-MCNC: 31 MG/DL (ref 7–20)
CALCIUM SERPL-MCNC: 9 MG/DL (ref 8.3–10.6)
CALCIUM SERPL-MCNC: 9.3 MG/DL (ref 8.3–10.6)
CALCIUM SERPL-MCNC: 9.6 MG/DL (ref 8.3–10.6)
CALCIUM SERPL-MCNC: 9.7 MG/DL (ref 8.3–10.6)
CANNABINOIDS UR QL SCN>50 NG/ML: NORMAL
CHLORIDE SERPL-SCNC: 64 MMOL/L (ref 99–110)
CHLORIDE SERPL-SCNC: 68 MMOL/L (ref 99–110)
CHLORIDE SERPL-SCNC: 70 MMOL/L (ref 99–110)
CHLORIDE SERPL-SCNC: <60 MMOL/L (ref 99–110)
CHLORIDE UR-SCNC: <20 MMOL/L
CHOLEST SERPL-MCNC: 225 MG/DL (ref 0–199)
CLARITY UR: CLEAR
CO2 BLDV-SCNC: 46 MMOL/L
CO2 SERPL-SCNC: 28 MMOL/L (ref 21–32)
CO2 SERPL-SCNC: 33 MMOL/L (ref 21–32)
CO2 SERPL-SCNC: 37 MMOL/L (ref 21–32)
CO2 SERPL-SCNC: 39 MMOL/L (ref 21–32)
COCAINE UR QL SCN: NORMAL
COHGB MFR BLDV: 1.3 % (ref 0–1.5)
COLOR UR: YELLOW
CREAT SERPL-MCNC: 2.2 MG/DL (ref 0.9–1.3)
CREAT SERPL-MCNC: 2.3 MG/DL (ref 0.9–1.3)
CREAT SERPL-MCNC: 2.3 MG/DL (ref 0.9–1.3)
CREAT SERPL-MCNC: 2.4 MG/DL (ref 0.9–1.3)
DEPRECATED RDW RBC AUTO: 15.6 % (ref 12.4–15.4)
DO-HGB MFR BLDV: 50.4 %
DRUG SCREEN COMMENT UR-IMP: NORMAL
EOSINOPHIL # BLD: 0.1 K/UL (ref 0–0.6)
EOSINOPHIL NFR BLD: 1 %
ETHANOLAMINE SERPL-MCNC: 44 MG/DL (ref 0–0.08)
FENTANYL SCREEN, URINE: NORMAL
GFR SERPLBLD CREATININE-BSD FMLA CKD-EPI: 34 ML/MIN/{1.73_M2}
GFR SERPLBLD CREATININE-BSD FMLA CKD-EPI: 35 ML/MIN/{1.73_M2}
GFR SERPLBLD CREATININE-BSD FMLA CKD-EPI: 35 ML/MIN/{1.73_M2}
GFR SERPLBLD CREATININE-BSD FMLA CKD-EPI: 37 ML/MIN/{1.73_M2}
GLUCOSE SERPL-MCNC: 101 MG/DL (ref 70–99)
GLUCOSE SERPL-MCNC: 123 MG/DL (ref 70–99)
GLUCOSE SERPL-MCNC: 84 MG/DL (ref 70–99)
GLUCOSE SERPL-MCNC: 85 MG/DL (ref 70–99)
GLUCOSE UR STRIP.AUTO-MCNC: NEGATIVE MG/DL
HCO3 BLDV-SCNC: 43.8 MMOL/L (ref 24–28)
HCT VFR BLD AUTO: 32.4 % (ref 40.5–52.5)
HDLC SERPL-MCNC: 40 MG/DL (ref 40–60)
HGB BLD-MCNC: 11.3 G/DL (ref 13.5–17.5)
HGB UR QL STRIP.AUTO: ABNORMAL
KETONES UR STRIP.AUTO-MCNC: NEGATIVE MG/DL
LDLC SERPL CALC-MCNC: 163 MG/DL
LEUKOCYTE ESTERASE UR QL STRIP.AUTO: NEGATIVE
LYMPHOCYTES # BLD: 1.3 K/UL (ref 1–5.1)
LYMPHOCYTES NFR BLD: 13 %
MAGNESIUM SERPL-MCNC: 2.06 MG/DL (ref 1.8–2.4)
MAGNESIUM SERPL-MCNC: 2.2 MG/DL (ref 1.8–2.4)
MAGNESIUM SERPL-MCNC: 2.28 MG/DL (ref 1.8–2.4)
MCH RBC QN AUTO: 29.8 PG (ref 26–34)
MCHC RBC AUTO-ENTMCNC: 34.8 G/DL (ref 31–36)
MCV RBC AUTO: 85.7 FL (ref 80–100)
METHADONE UR QL SCN>300 NG/ML: NORMAL
METHGB MFR BLDV: 0.6 % (ref 0–1.5)
MICROCYTES BLD QL SMEAR: ABNORMAL
MONOCYTES # BLD: 0.7 K/UL (ref 0–1.3)
MONOCYTES NFR BLD: 9 %
NEUTROPHILS # BLD: 5.7 K/UL (ref 1.7–7.7)
NEUTROPHILS NFR BLD: 73 %
NITRITE UR QL STRIP.AUTO: NEGATIVE
OPIATES UR QL SCN>300 NG/ML: NORMAL
OSMOLALITY SERPL: 247 MOSM/KG (ref 278–305)
OSMOLALITY UR: 213 MOSM/KG (ref 390–1070)
OXYCODONE UR QL SCN: NORMAL
PCO2 BLDV: 56.3 MMHG (ref 41–51)
PCP UR QL SCN>25 NG/ML: NORMAL
PH BLDV: 7.5 [PH] (ref 7.35–7.45)
PH UR STRIP.AUTO: 7.5 [PH] (ref 5–8)
PH UR STRIP: 8 [PH]
PHOSPHATE SERPL-MCNC: 2.4 MG/DL (ref 2.5–4.9)
PHOSPHATE SERPL-MCNC: 2.9 MG/DL (ref 2.5–4.9)
PHOSPHATE SERPL-MCNC: 4.4 MG/DL (ref 2.5–4.9)
PLATELET # BLD AUTO: 228 K/UL (ref 135–450)
PLATELET BLD QL SMEAR: ABNORMAL
PMV BLD AUTO: 8.2 FL (ref 5–10.5)
PO2 BLDV: <30 MMHG (ref 25–40)
POTASSIUM SERPL-SCNC: 2.8 MMOL/L (ref 3.5–5.1)
POTASSIUM SERPL-SCNC: 3.2 MMOL/L (ref 3.5–5.1)
POTASSIUM SERPL-SCNC: 3.6 MMOL/L (ref 3.5–5.1)
POTASSIUM SERPL-SCNC: ABNORMAL MMOL/L (ref 3.5–5.1)
POTASSIUM UR-SCNC: 19.9 MMOL/L
PROT UR STRIP.AUTO-MCNC: NEGATIVE MG/DL
RBC # BLD AUTO: 3.79 M/UL (ref 4.2–5.9)
RBC #/AREA URNS HPF: NORMAL /HPF (ref 0–4)
SAO2 % BLDV: 49 %
SODIUM SERPL-SCNC: 106 MMOL/L (ref 136–145)
SODIUM SERPL-SCNC: 110 MMOL/L (ref 136–145)
SODIUM SERPL-SCNC: 112 MMOL/L (ref 136–145)
SODIUM SERPL-SCNC: 117 MMOL/L (ref 136–145)
SODIUM UR-SCNC: 40 MMOL/L
SP GR UR STRIP.AUTO: 1.01 (ref 1–1.03)
STOMATOCYTES BLD QL SMEAR: ABNORMAL
TRIGL SERPL-MCNC: 111 MG/DL (ref 0–150)
UA COMPLETE W REFLEX CULTURE PNL UR: ABNORMAL
UA DIPSTICK W REFLEX MICRO PNL UR: YES
URN SPEC COLLECT METH UR: ABNORMAL
UROBILINOGEN UR STRIP-ACNC: 1 E.U./DL
VARIANT LYMPHS NFR BLD MANUAL: 4 % (ref 0–6)
VLDLC SERPL CALC-MCNC: 22 MG/DL
WBC # BLD AUTO: 7.8 K/UL (ref 4–11)
WBC #/AREA URNS HPF: NORMAL /HPF (ref 0–5)

## 2025-02-12 PROCEDURE — 99223 1ST HOSP IP/OBS HIGH 75: CPT | Performed by: INTERNAL MEDICINE

## 2025-02-12 PROCEDURE — 82803 BLOOD GASES ANY COMBINATION: CPT

## 2025-02-12 PROCEDURE — 2500000003 HC RX 250 WO HCPCS

## 2025-02-12 PROCEDURE — 82570 ASSAY OF URINE CREATININE: CPT

## 2025-02-12 PROCEDURE — 6360000002 HC RX W HCPCS: Performed by: PHYSICIAN ASSISTANT

## 2025-02-12 PROCEDURE — 84540 ASSAY OF URINE/UREA-N: CPT

## 2025-02-12 PROCEDURE — 2580000003 HC RX 258

## 2025-02-12 PROCEDURE — 85025 COMPLETE CBC W/AUTO DIFF WBC: CPT

## 2025-02-12 PROCEDURE — 6360000002 HC RX W HCPCS

## 2025-02-12 PROCEDURE — 83735 ASSAY OF MAGNESIUM: CPT

## 2025-02-12 PROCEDURE — 6370000000 HC RX 637 (ALT 250 FOR IP)

## 2025-02-12 PROCEDURE — 80069 RENAL FUNCTION PANEL: CPT

## 2025-02-12 PROCEDURE — 36415 COLL VENOUS BLD VENIPUNCTURE: CPT

## 2025-02-12 PROCEDURE — 2580000003 HC RX 258: Performed by: INTERNAL MEDICINE

## 2025-02-12 PROCEDURE — 80307 DRUG TEST PRSMV CHEM ANLYZR: CPT

## 2025-02-12 RX ORDER — LORAZEPAM 2 MG/ML
4 INJECTION INTRAMUSCULAR
Status: DISCONTINUED | OUTPATIENT
Start: 2025-02-12 | End: 2025-02-12 | Stop reason: HOSPADM

## 2025-02-12 RX ORDER — ONDANSETRON 2 MG/ML
4 INJECTION INTRAMUSCULAR; INTRAVENOUS EVERY 6 HOURS PRN
Status: DISCONTINUED | OUTPATIENT
Start: 2025-02-12 | End: 2025-02-12 | Stop reason: HOSPADM

## 2025-02-12 RX ORDER — METOPROLOL TARTRATE 25 MG/1
12.5 TABLET, FILM COATED ORAL 2 TIMES DAILY
Status: DISCONTINUED | OUTPATIENT
Start: 2025-02-12 | End: 2025-02-12 | Stop reason: HOSPADM

## 2025-02-12 RX ORDER — THIAMINE HYDROCHLORIDE 100 MG/ML
100 INJECTION, SOLUTION INTRAMUSCULAR; INTRAVENOUS 2 TIMES DAILY
Status: DISCONTINUED | OUTPATIENT
Start: 2025-02-12 | End: 2025-02-12 | Stop reason: HOSPADM

## 2025-02-12 RX ORDER — LORAZEPAM 1 MG/1
1 TABLET ORAL
Status: DISCONTINUED | OUTPATIENT
Start: 2025-02-12 | End: 2025-02-12 | Stop reason: HOSPADM

## 2025-02-12 RX ORDER — LORAZEPAM 1 MG/1
2 TABLET ORAL
Status: DISCONTINUED | OUTPATIENT
Start: 2025-02-12 | End: 2025-02-12 | Stop reason: HOSPADM

## 2025-02-12 RX ORDER — SODIUM CHLORIDE 0.9 % (FLUSH) 0.9 %
5-40 SYRINGE (ML) INJECTION EVERY 12 HOURS SCHEDULED
Status: DISCONTINUED | OUTPATIENT
Start: 2025-02-12 | End: 2025-02-12 | Stop reason: HOSPADM

## 2025-02-12 RX ORDER — MULTIVITAMIN WITH IRON
1 TABLET ORAL DAILY
Status: DISCONTINUED | OUTPATIENT
Start: 2025-02-12 | End: 2025-02-12 | Stop reason: HOSPADM

## 2025-02-12 RX ORDER — SODIUM CHLORIDE 9 MG/ML
INJECTION, SOLUTION INTRAVENOUS CONTINUOUS
Status: DISCONTINUED | OUTPATIENT
Start: 2025-02-12 | End: 2025-02-12

## 2025-02-12 RX ORDER — LORAZEPAM 1 MG/1
3 TABLET ORAL
Status: DISCONTINUED | OUTPATIENT
Start: 2025-02-12 | End: 2025-02-12 | Stop reason: HOSPADM

## 2025-02-12 RX ORDER — POTASSIUM CHLORIDE 7.45 MG/ML
10 INJECTION INTRAVENOUS
Status: COMPLETED | OUTPATIENT
Start: 2025-02-12 | End: 2025-02-12

## 2025-02-12 RX ORDER — LORAZEPAM 2 MG/ML
1 INJECTION INTRAMUSCULAR
Status: DISCONTINUED | OUTPATIENT
Start: 2025-02-12 | End: 2025-02-12 | Stop reason: HOSPADM

## 2025-02-12 RX ORDER — SODIUM CHLORIDE 9 MG/ML
INJECTION, SOLUTION INTRAVENOUS PRN
Status: DISCONTINUED | OUTPATIENT
Start: 2025-02-12 | End: 2025-02-12 | Stop reason: HOSPADM

## 2025-02-12 RX ORDER — THIAMINE HYDROCHLORIDE 100 MG/ML
100 INJECTION, SOLUTION INTRAMUSCULAR; INTRAVENOUS 2 TIMES DAILY
Status: DISCONTINUED | OUTPATIENT
Start: 2025-02-12 | End: 2025-02-12

## 2025-02-12 RX ORDER — POLYETHYLENE GLYCOL 3350 17 G/17G
17 POWDER, FOR SOLUTION ORAL DAILY PRN
Status: DISCONTINUED | OUTPATIENT
Start: 2025-02-12 | End: 2025-02-12 | Stop reason: HOSPADM

## 2025-02-12 RX ORDER — LORAZEPAM 1 MG/1
4 TABLET ORAL
Status: DISCONTINUED | OUTPATIENT
Start: 2025-02-12 | End: 2025-02-12 | Stop reason: HOSPADM

## 2025-02-12 RX ORDER — ACETAMINOPHEN 325 MG/1
650 TABLET ORAL EVERY 6 HOURS PRN
Status: DISCONTINUED | OUTPATIENT
Start: 2025-02-12 | End: 2025-02-12 | Stop reason: HOSPADM

## 2025-02-12 RX ORDER — ENOXAPARIN SODIUM 100 MG/ML
40 INJECTION SUBCUTANEOUS DAILY
Status: DISCONTINUED | OUTPATIENT
Start: 2025-02-12 | End: 2025-02-12 | Stop reason: HOSPADM

## 2025-02-12 RX ORDER — 3% SODIUM CHLORIDE 3 G/100ML
25 INJECTION, SOLUTION INTRAVENOUS CONTINUOUS
Status: DISCONTINUED | OUTPATIENT
Start: 2025-02-12 | End: 2025-02-12 | Stop reason: HOSPADM

## 2025-02-12 RX ORDER — SODIUM CHLORIDE 0.9 % (FLUSH) 0.9 %
5-40 SYRINGE (ML) INJECTION PRN
Status: DISCONTINUED | OUTPATIENT
Start: 2025-02-12 | End: 2025-02-12 | Stop reason: HOSPADM

## 2025-02-12 RX ORDER — ONDANSETRON 4 MG/1
4 TABLET, ORALLY DISINTEGRATING ORAL EVERY 8 HOURS PRN
Status: DISCONTINUED | OUTPATIENT
Start: 2025-02-12 | End: 2025-02-12 | Stop reason: HOSPADM

## 2025-02-12 RX ORDER — GAUZE BANDAGE 2" X 2"
100 BANDAGE TOPICAL DAILY
Status: DISCONTINUED | OUTPATIENT
Start: 2025-02-12 | End: 2025-02-12

## 2025-02-12 RX ORDER — LORAZEPAM 2 MG/ML
2 INJECTION INTRAMUSCULAR
Status: DISCONTINUED | OUTPATIENT
Start: 2025-02-12 | End: 2025-02-12 | Stop reason: HOSPADM

## 2025-02-12 RX ORDER — ACETAMINOPHEN 650 MG/1
650 SUPPOSITORY RECTAL EVERY 6 HOURS PRN
Status: DISCONTINUED | OUTPATIENT
Start: 2025-02-12 | End: 2025-02-12 | Stop reason: HOSPADM

## 2025-02-12 RX ORDER — LORAZEPAM 2 MG/ML
3 INJECTION INTRAMUSCULAR
Status: DISCONTINUED | OUTPATIENT
Start: 2025-02-12 | End: 2025-02-12 | Stop reason: HOSPADM

## 2025-02-12 RX ADMIN — ENOXAPARIN SODIUM 40 MG: 100 INJECTION SUBCUTANEOUS at 08:28

## 2025-02-12 RX ADMIN — POTASSIUM CHLORIDE 10 MEQ: 10 INJECTION, SOLUTION INTRAVENOUS at 06:30

## 2025-02-12 RX ADMIN — METOPROLOL TARTRATE 12.5 MG: 25 TABLET, FILM COATED ORAL at 20:17

## 2025-02-12 RX ADMIN — POTASSIUM BICARBONATE 40 MEQ: 782 TABLET, EFFERVESCENT ORAL at 06:30

## 2025-02-12 RX ADMIN — Medication 100 MG: at 08:28

## 2025-02-12 RX ADMIN — THIAMINE HYDROCHLORIDE 100 MG: 100 INJECTION, SOLUTION INTRAMUSCULAR; INTRAVENOUS at 20:17

## 2025-02-12 RX ADMIN — POTASSIUM BICARBONATE 40 MEQ: 782 TABLET, EFFERVESCENT ORAL at 08:28

## 2025-02-12 RX ADMIN — SODIUM CHLORIDE, PRESERVATIVE FREE 10 ML: 5 INJECTION INTRAVENOUS at 20:17

## 2025-02-12 RX ADMIN — THERA TABS 1 TABLET: TAB at 08:28

## 2025-02-12 RX ADMIN — POTASSIUM CHLORIDE 10 MEQ: 10 INJECTION, SOLUTION INTRAVENOUS at 08:30

## 2025-02-12 RX ADMIN — MAGNESIUM SULFATE HEPTAHYDRATE 2000 MG: 40 INJECTION, SOLUTION INTRAVENOUS at 00:04

## 2025-02-12 RX ADMIN — METOPROLOL TARTRATE 12.5 MG: 25 TABLET, FILM COATED ORAL at 08:28

## 2025-02-12 RX ADMIN — SODIUM CHLORIDE 25 ML/HR: 3 INJECTION, SOLUTION INTRAVENOUS at 09:05

## 2025-02-12 RX ADMIN — SODIUM CHLORIDE: 9 INJECTION, SOLUTION INTRAVENOUS at 00:52

## 2025-02-12 ASSESSMENT — ENCOUNTER SYMPTOMS
NAUSEA: 1
ABDOMINAL PAIN: 0
VOMITING: 1
DIARRHEA: 0

## 2025-02-12 ASSESSMENT — PAIN SCALES - GENERAL
PAINLEVEL_OUTOF10: 0
PAINLEVEL_OUTOF10: 0

## 2025-02-12 NOTE — PROGRESS NOTES
Pt A&O, impulsive. Denies N/V. Agreeable to another IV for 3% infusion. Gave pt a  for his phone, pt does have a vape with him (told him he is unable to use vape in the hospital). Bed alarm on.

## 2025-02-12 NOTE — PROGRESS NOTES
Pt. Attempting to walk out of room at this time, pt states he would like to \"charge his phone in the lobby\", Pt. Able to be redirected. Bed alarm and camera remain place.     Electronically signed by LAUREL JUAREZ RN on 2/12/2025 at 6:05 AM

## 2025-02-12 NOTE — PLAN OF CARE
Problem: Discharge Planning  Goal: Discharge to home or other facility with appropriate resources  Outcome: Progressing  Flowsheets  Taken 2/12/2025 0302  Discharge to home or other facility with appropriate resources: Identify barriers to discharge with patient and caregiver  Taken 2/12/2025 0220  Discharge to home or other facility with appropriate resources: Identify barriers to discharge with patient and caregiver     Problem: Safety - Adult  Goal: Free from fall injury  Outcome: Progressing  Flowsheets (Taken 2/12/2025 0302)  Free From Fall Injury: Instruct family/caregiver on patient safety     Problem: ABCDS Injury Assessment  Goal: Absence of physical injury  Outcome: Progressing  Flowsheets (Taken 2/12/2025 0302)  Absence of Physical Injury: Implement safety measures based on patient assessment     Problem: Metabolic/Fluid and Electrolytes - Adult  Goal: Electrolytes maintained within normal limits  Outcome: Progressing  Flowsheets (Taken 2/12/2025 0302)  Electrolytes maintained within normal limits: Monitor labs and assess patient for signs and symptoms of electrolyte imbalances

## 2025-02-12 NOTE — CONSULTS
Nephrology Consult Note                                                                                                                                                                                                                                                                                                                                                               Office : [phone]     Fax :[phone]    Patient's Name: Bill Fish  10:03 AM  2/12/2025    Reason for Consult:  Severe hyponatremia   Requesting Physician:  Dominic Manuel MD  Chief Complaint:    Chief Complaint   Patient presents with    Vomiting    Nausea     N/V x2 days. Hasn't been able to keep anything down.       Assessment/Plan     # Severe hyponatremia  - Secondary to hypovolemia/extreme dehydration   - Urine osmo 213, urine Na 40, urine chloride < 20  - Continue 3% sodium chloride infusion   - Place on 1.2L fluid restriction   - Monitor Na every 4 hours     # LIZZETTE   - Secondary to hypovolemia/extreme dehydration  - On fluids, as above  - Creatinine slowly improving   - Monitor UOP  - Avoid nephrotoxins  - Monitor renal labs     # Hypertension  - BP acceptable for now  - Monitor     # Acid- base/ Electrolyte imbalance   - Na management as above  - Replace K PRN     # Alcohol abuse disorder  - Cessation advised  - Thiamine added  - CIWA protocol        History of Present Ilness:    Bill Fish is a 44 y.o. male with a PMH of alcohol use disorder and HTN, who presented from home with nausea and uncontrolled vomiting. Over the past 4 days, patient has had reduced appetite and not been able to keep anything down. He had gone out with his co-worker and had 4-5 shots of alcohol. He again started vomiting so he came to the ER for further evaluation. Lab work-up in the ER revealed a sodium level of 105, potassium of 3.2, and creatinine of 2.5. He was admitted for further evaluation and management. We are consulted for          Medical Decision Making:  The following items were considered in medical decision making:  Discussion of patient care with other providers  Reviewed clinical lab tests  Reviewed radiology tests  Reviewed other diagnostic tests/interventions    Will be discussed w/  Primary team     Thank you for allowing us to participate in care of Bill Fish   Feel free to contact me,     Marge Dong PA-C   [facility]  Office : [phone] or through [employer]  Fax :[phone]      I have seen the patient independently from the PA/NP .I discussed the care with the PA/NP . I personally reviewed the HPI, PH, FH, SH, ROS and medications. I repeated pertinent portions of the examination and reviewed the relevant imaging and laboratory data. I agree with the findings, assessment and plan as documented, with the following addendum:    Pt is threatening to leave AMA   He under stands the complications of that         Jeremiah Goncalves MD

## 2025-02-12 NOTE — PROGRESS NOTES
Latest Reference Range & Units 02/12/25 04:50   Sodium 136 - 145 mmol/L 106 (LL)   Potassium 3.5 - 5.1 mmol/L 2.8 (LL)       ICU Provider made aware.    Electronically signed by LAUREL JUAREZ RN on 2/12/2025 at 6:17 AM

## 2025-02-12 NOTE — PLAN OF CARE
[facility] [place] Hospital[place] brief note  Consult received.  Case reviewed with ER physician- admit to ICU for     Full note to follow.    Dominic Manuel MD    Thanks  BREEZY WATERS MD

## 2025-02-12 NOTE — PROGRESS NOTES
Pt Refused PO Metoprolol [employer]. ICU Residents made aware. BP Elevated at this time    173/85 - 3:15 AM, 2/12/25    Electronically signed by LAUREL JUAREZ RN on 2/12/2025 at 3:29 AM

## 2025-02-12 NOTE — H&P
ICU HISTORY AND PHYSICAL       Admit Date:  2/11/2025                            Hospital Day: 1  ICU Day: 1      CC: nausea and vomiting    History obtained from:  chart review and the patient    SUBJECTIVE   HPI:    Mr. Bill Fish is a 44 y.o. male with a medical hx significant for alcohol use disorder, hypertension, otherwise as listed in the MHx table below, who presented from home to the ED on 2/11/25 with nausea and vomiting.    Patient reports for the last 4 days he has had reduced appetite and has been unable to tolerate p.o. intake. Over the last 2 days patient reports that he has had constant nonbloody nonbilious emesis and is unable to keep food down.  He also endorses cramps in his arms, legs and jaw.  Today, he went out for drinks with his coworkers and had 4-5 shots of alcohol; he then began to once again vomit uncontrollably which prompted him to come to the ED. Denies any abdominal pain, diarrhea, headache, weakness, dizziness, confusion, and disorientation.  No sick contacts.    He reports he has had episodes of nausea vomiting like this in the past when he went through withdrawal however he states he has had reduced alcohol intake over the last few months after being admitted for alcohol use disorder in the past.  States he has only been drinking socially for the last few months and usually drinks only 3-4 drinks.  States he does not think he is in withdrawal right now and denies any withdrawal symptoms.      ED Course:  On arrival to the ED, /98, HR 80, RR 18, afebrile, saturating well on room air   Labs were significant for: Na 105, K 3.2, Cr 2.5, Mg 1.79, gluc 128, ALT 92, , T Jamir 1.5, WBC wnl, Hg 13.0  Imaging: none  While in the ED, he received: magnesium (2,000 mg in 50 mL) and potassium (40 mEq) replacement, 1,000 mL bolus, zofran       Past Medical History:   Diagnosis Date    Alcohol abuse     Anxiety     Hypertension        History reviewed. No pertinent surgical  admission:  From home  Current:  ICU  At discharge:  BECCA Archer MD, PGY-1  Internal Medicine Resident  Contact via [employer]    Will discuss with attending physician, Dr. Arreguin.    I saw and evaluated the patient, performing the key elements of the service.  I discussed the findings, assessment and plan with the resident and agree with the resident's findings and plan as documented in the resident's note.  Pt has a high probability of imminent or life-threatening deterioration requiring close monitoring, and highly complex decision-making and/or interventions of high intensity to assess, manipulate, and support his critical organ systems to prevent the his inevitable decline which would occur if left untreated.   A total critical care time 30 minutes spent, this includes but not limited to examining patient, collaborating with other physicians, monitoring vital signs, telemetry, and clinical response to IV medications; documentation time, review and interpretation of laboratory and radiological data, review of nursing notes and old record review. This time excludes any time that may have been spent performing procedures.

## 2025-02-12 NOTE — ED NOTES
Patient Name: Bill Fish  : 1980 44 y.o.  MRN: [id number]  ED Room #: A03/A03-03     Chief complaint:   Chief Complaint   Patient presents with    Vomiting    Nausea     N/V x2 days. Hasn't been able to keep anything down.     Hospital Problem/Diagnosis:   Hospital Problems             Last Modified POA    * (Principal) Hyponatremia 2025 Yes         O2 Flow Rate:O2 Device: None (Room air)   (if applicable)  Cardiac Rhythm:   (if applicable)  Active LDA's:   Peripheral IV 25 Right Antecubital (Active)            How does patient ambulate? Stand by assist    2. How does patient take pills? Whole with Water    3. Is patient alert? Alert    4. Is patient oriented? To Person, To Place, To Time, To Situation, and Follows Commands    5.   Patient arrived from:  home  Facility Name: ___________________________________________    6. If patient is disoriented or from a Skill Nursing Facility has family been notified of admission?  NA    7. Patient belongings? Belongings: Cell Phone, Wallet, and Clothing    Disposition of belongings? Kept with Patient     8. Any specific patient or family belongings/needs/dynamics?   a. NA    9. Miscellaneous comments/pending orders?  a. NA     If there are any additional questions please reach out to the Emergency Department.      Ricardo Moore RN  [phone]

## 2025-02-12 NOTE — ED PROVIDER NOTES
[facility]  EMERGENCY DEPARTMENT ENCOUNTER          PHYSICIAN ASSISTANT NOTE       Date of evaluation: 2/11/2025    Chief Complaint     Vomiting and Nausea (N/V x2 days. Hasn't been able to keep anything down.)      History of Present Illness     Bill Fsih is a 44 y.o. male who presents to the ED with nausea and vomiting.  Patient is over the last 3 days he has had nausea and vomiting along with the inability to tolerate p.o.  He denies any constipation or diarrhea.  Denies blood in his vomit.  He denies any abdominal pains, only significant nausea.  He states he has had similar symptoms in the past and this feels similar.  He does have a history of alcohol use, however states he has cut back recently.  He states he only drinks around 3 times per week.  He does state that today around 5 PM he went out with some coworkers and had 3 shots of liquor.  Shortly after he began vomiting.  He states prior to tonight the last time he had any alcohol was around 2/6/2025.  Denies any concern for alcohol withdrawal.  Denies marijuana use.  Denies any known sick contacts.    ASSESSMENT / PLAN  (MEDICAL DECISION MAKING)     INITIAL VITALS: BP: (!) 157/98, Temp: 98.1 °F (36.7 °C), Pulse: 80, Respirations: 18, SpO2: 99 %    Bill Fish is a 44 y.o. male who presents to the ED with nausea and vomiting.  Vital signs stable on presentation remained stable throughout his stay.  Thorough history and physical exam performed.    Patient presents to the emergency department complaints of nausea and vomiting.  He states that over the last 3 days he has had nausea and vomiting.  He has been unable to tolerate any p.o. during the course of the symptoms.  Denies any constipation or diarrhea.  Denies any blood in his vomit.  Denies any abdominal pain.  He does endorse occasional alcohol use, however states that he has cut back significantly.  He currently endorses drinking around 3 days/week.  He does state that today  back: Normal range of motion.   Skin:     General: Skin is warm and dry.   Neurological:      General: No focal deficit present.      Mental Status: He is alert and oriented to person, place, and time.   Psychiatric:         Mood and Affect: Mood normal.         Behavior: Behavior normal.             Aiden Long PA-C  02/11/25 0382

## 2025-02-12 NOTE — PLAN OF CARE
Problem: Discharge Planning  Goal: Discharge to home or other facility with appropriate resources  2/12/2025 0843 by Sherita Nunez RN  Outcome: Progressing  2/12/2025 0302 by Des Adams RN  Outcome: Progressing  Flowsheets  Taken 2/12/2025 0302  Discharge to home or other facility with appropriate resources: Identify barriers to discharge with patient and caregiver  Taken 2/12/2025 0220  Discharge to home or other facility with appropriate resources: Identify barriers to discharge with patient and caregiver     Problem: Safety - Adult  Goal: Free from fall injury  2/12/2025 0843 by Sherita Nunez RN  Outcome: Progressing  2/12/2025 0302 by Des Adams RN  Outcome: Progressing  Flowsheets (Taken 2/12/2025 0302)  Free From Fall Injury: Instruct family/caregiver on patient safety     Problem: ABCDS Injury Assessment  Goal: Absence of physical injury  2/12/2025 0843 by Sherita Nunez RN  Outcome: Progressing  2/12/2025 0302 by Des Adams RN  Outcome: Progressing  Flowsheets (Taken 2/12/2025 0302)  Absence of Physical Injury: Implement safety measures based on patient assessment     Problem: Metabolic/Fluid and Electrolytes - Adult  Goal: Electrolytes maintained within normal limits  2/12/2025 0843 by Sherita Nunez RN  Outcome: Progressing  2/12/2025 0302 by Des Adams RN  Outcome: Progressing  Flowsheets (Taken 2/12/2025 0302)  Electrolytes maintained within normal limits: Monitor labs and assess patient for signs and symptoms of electrolyte imbalances  Goal: Hemodynamic stability and optimal renal function maintained  Outcome: Progressing

## 2025-02-12 NOTE — PROGRESS NOTES
Admission: Patient received to room 4516  from ED RM # 3.   Patient admitted with Dx Hyponatremia .    Patient A&Ox 4 upon arrival.   Tele monitor applied, rate and rhythm verified with monitor reader.  VSS.   Patient oriented to room, staff, and call system.   Educated on fall protocol and hourly rounding.   Assessment as documented.   Admission navigator complete.   IVF infusing @ 125 NS. PIV R AC   Bed locked in low position.   Patient informed to utilize call light with any needs. Pt verbalized understanding.   Call light within reach.     Electronically signed by LAUREL JUAREZ RN on 2/12/2025 at 2:46 AM

## 2025-02-12 NOTE — PROGRESS NOTES
V2.0    [facility] Progress Note      Name:  Bill Fish /Age/Sex: 1980  (44 y.o. male)   MRN & CSN:  [id number] & [id number] Encounter Date/Time: 2025 3:21 PM EST   Location:  [facility]- PCP: Dominic Manuel MD     Attending:Estefany Brantley MD       Hospital Day: 2    Assessment and Recommendations   44 y.o. male who presented to the ED on 2025 with nausea and vomiting, found to have severe hyponatremia, admitted to ICU for further management      Severe hyponatremia sodium 104 chloride less than 60  Reports nausea vomiting for the last few days  Patient has severe hypokalemia that could be contributing to hyponatremia as well along with hypovolemia, severe dehydration.  Urine studies with urine sodium of 40 chloride less than 20 urine osmolarity of 213  Started on 3% saline  Fever restriction  Nephrology been consulted further management  Sodium did come up to 110, nephrology following, patient will receive DDAVP if overcorrected  Patient wants to leave the hospital 9 PM, explained to him risk benefits of being in the hospital.  I explained to him that we need to closely monitor sodium levels at this could be life-threatening.  I had further explained to him that we need to closely monitor sodium level so he does not overcorrect as well.  I expect patient needs to be in the hospital at least for 48 hours.  If he is unwilling to stay he will need to leave AGAINST MEDICAL ADVICE.  In my medical opinion patient does have capacity make medical decisions    Metabolic alkalosis likely due to intractable vomiting    Alcohol use disorder, he is unable to tell me how much he drinks but says he only drinks when he goes out.  Admission note from resident states that last drink was 5:30 PM on  which were around 4 shots.  Patient has been cutting down on alcohol last 4 months.  Ethanol level on admission was 44  Continue CIWA protocol with Ativan, multivitamin, thiamine    Acute kidney injury    Component Value Date/Time    LABA1C 5.5 12/24/2020 05:20 PM     TSH:   Lab Results   Component Value Date/Time    TSH 2.15 01/26/2022 05:14 PM     Troponin: No results found for: \"TROPONINT\"  Lactic Acid: No results for input(s): \"LACTA\" in the last 72 hours.  BNP: No results for input(s): \"PROBNP\" in the last 72 hours.  UA:  Lab Results   Component Value Date/Time    NITRU Negative 02/11/2025 11:54 PM    COLORU Yellow 02/11/2025 11:54 PM    PHUR 8.0 02/12/2025 03:12 AM    PHUR 5.5 11/05/2023 03:56 PM    LABCAST 5-10 Hyaline 12/17/2019 11:10 AM    WBCUA None seen 02/11/2025 11:54 PM    RBCUA 3-4 02/11/2025 11:54 PM    MUCUS Rare 02/05/2021 01:58 PM    BACTERIA Rare 02/05/2021 01:58 PM    CLARITYU Clear 02/11/2025 11:54 PM    LEUKOCYTESUR Negative 02/11/2025 11:54 PM    UROBILINOGEN 1.0 02/11/2025 11:54 PM    BILIRUBINUR Negative 02/11/2025 11:54 PM    BLOODU SMALL 02/11/2025 11:54 PM    GLUCOSEU Negative 02/11/2025 11:54 PM    KETUA Negative 02/11/2025 11:54 PM     Urine Cultures:   Lab Results   Component Value Date/Time    LABURIN  02/05/2021 01:58 PM     <10,000 CFU/ml mixed skin/urogenital jean paul. No further workup     Blood Cultures: No results found for: \"BC\"  No results found for: \"BLOODCULT2\"  Organism: No results found for: \"ORG\"      Electronically signed by Estefany Brantley MD on 2/12/2025 at 3:21 PM

## 2025-02-12 NOTE — PROGRESS NOTES
PROGRESS NOTE    **THIS VISIT WAS DONE BY TWO-WAY LIVE AUDIO/VIDEO TECHNOLOGY ON 9/25/23 AT 3:15pm**    THE PARENT VERBALLY AGREES TO THIS VISIT    Writer spoke with parent to obtain history. Patient and writer both in their respective homes during the visit.     Reason for Visit: Medication Management and Therapy   Patient is accompanied by: mom (Linda)  Total time spent: 20 minutes, with at least 16 minutes spent in psychotherapy (patient not present for visit)    Identifying information: Tamara Cleveland is a 16 year old female with current working diagnosis of anxiety, depression and ADHD who presents to clinic for follow-up.     BRIEF HISTORY OF PRESENT ILLNESS:     Writer met with the patient and mother together and spoke with both individually to gather history.    Anxiety:  Current regimen consisting of Zoloft 75mg daily.  Tamara denies excessive worries, nervousness or feeling restless/on edge most days. Patient denies difficulty with managing worries, panic symptoms or trouble relaxing. Denies any acute stressors.     Historically, mom describes an increase in anxiety since the fall 2021.  In grade school, mom describes Tamara having fears of being on time, running late to school or certain classes.  With the pandemic, patient seemed to become more comfortable with remote learning and transition back to in person learning was a bit difficult. Since the start of the school (fall '21), mom has seen an increased level of anxiety, worrying about tests, doing well in school.  Academically, Tamara is a great student however math is her most difficult class.  In speaking with the patient directly, she describes feeling nervous and anxious most days.  If she is walking to her classes she worries about running late or missing something important.  She often experiences panic attacks during the school day specifically when walking from her English class to PE.  Both classes are at the opposite end of the school and  Pt continues to deny being cleaned up, has urine on pants. Harley any other needs as well   she has frequent times where she is late to her PE because of this.  She worries about doing well on tests, gets overwhelmed over \"simple things\".  She also acknowledges worrying about how she is perceived by others and what people think of her.  In the past, she describes being very social though now being more reserved in social situations.  She often worries that \"something bad will happen\" without any antecedents.  If something bad does end up happening she feels this reinforces her initial fear.  When she experiences panic attacks she has difficulty breathing, heart racing, feels dizzy.  Duration is typically a few minutes with no alleviating factors.  Mom does not typically see significant social anxiety.      Emotional/Behavioral: Writer met with the patient to review progress in the interim.  Patient started high school towards the end of August and seems to have transitioned well.  Parents feel she has been doing relatively well.  She is involved in [employer]. Tamara denies persistent depression, anhedonia, feelings of hopelessness or worthlessness.     Historically, patient describes days marked by persistent sadness, anhedonia, losing motivation and loss of interest in things that she previously enjoyed.  She also endorses feeling insecure about certain things though acknowledges that she does enjoy spending time with her friends.  Associated symptoms include feelings of worthlessness, poor sleep.        ADHD: Current regimen consisting of Focalin XR 10mg QAM (~8am). Tamara is a 8th grader at [employer]. She is typically in school from 8:45 AM to 3:40 PM.  Patient started high school this fall and seems to be adjusting well.  Patient reports that she finds going to be boring.  She is currently taking 2 honors classes (biology and English).  Academically, she seems to be caught up with assignments.  She took Focalin off and on over the past month and a half.  Currently, patient denies persistent  difficulties with attention or focus.  Occasionally does get distracted but does not report this to be significant issue.  Parents however do not see significant change or off medication.     Historically, patient had neuropsychological testing done recently.  Parents were looking to rule out learning disability and were somewhat surprised to discover that she met criteria for ADHD.  Looking back, mom does recall seeing difficulties with organizational challenges, forgetfulness, lack of follow-through.       Sleep: Patient goes to bed around 8:30-9pm. Awake by 7am. Has been sleeping better. Historically, she describes thinking about school a lot at night, thinks has to do something and if doesn't do it something bad will happen. Patient denies taking any naps during the day.    Therapy: Part of the session was spent providing supportive psychotherapy. Reviewed progress in the interim between visits. Discussed recent stressors and processed ways of continuing to use healthy adaptive skills to manage. A portion of the session focused on assessment of factors contributing to current presentation.  Talked about transition to high school, adjusting to new environment and class schedule.  Also discussed ways to avoid procrastination which only made to stress.  Tamara was coached in a variety of coping strategies and the appropriate expression of thoughts, feelings, needs and steps to problem solving collaboratively.        Writer met with parent to review progress in the interm.   Mom feels there has not been much benefit with medication thus far.   A portion of the session focused on psychoeducation about Tamara's condition, support, and discussion of effective parenting strategies.  Writer provided coaching/modeling and reinforced effective parenting strategies, behavioral interventions for home and collaborative problem solving skills. Parents were educated regarding factors impacting the Tamara's behavioral and  emotional functioning.  Current medications:   Current Outpatient Medications   Medication Sig   • sertraline (ZOLOFT) 50 MG tablet TAKE 1 AND 1/2 TABLETS DAILY BY MOUTH   • dexmethylphenidate (Focalin XR) 10 MG 24 hr capsule Take 1 capsule by mouth daily.   • dicyclomine (BENTYL) 10 MG capsule Take 1 capsule by mouth in the morning and 1 capsule in the evening.   • loratadine (CLARITIN) 10 MG tablet Take 10 mg by mouth daily.   • fluticasone (FLONASE) 50 MCG/ACT nasal spray SPRAY 1 SPRAY INTO EACH NOSTRIL EVERY DAY   • Probiotic Product (Quizens PROBIOTIC CHILDRENS) Chew Tab    • Pediatric Multivitamins-Iron (CHILDRENS MULTI VITAMINS/IRON PO)    • Peppermint Oil 90 MG Cap CR Take 90 mg by mouth daily.     No current facility-administered medications for this visit.     Patient taking medications exactly as prescribed per previous visit: Yes  Side effects: No  Vitals:   Visit Vitals  LMP 06/10/2023 (Approximate)   Review of Systems: All systems reviewed and negative with exception of below   - Psychiatric: less anxious, no psychosis or jordan   - Neurological: no headaches or weakness   - Cardiac: no chest pain, palpitations, or shortness of breath    Psychiatric History:  Pertinent history reviewed with patient and parents with no clinically significant changes in the interim.   Previous diagnosis: Per mother, patient has no prior psychiatric history of note  Neuropsychological testing: [facility] (2022)->Attention-Deficit/Hyperactivity Disorder, Predominantly Inattentive Presentation; MDD and Unspecified Anxiety Disorder, r/o dyscalculia  Previous outpatient treatment:             - Therapy: Carolyn Pedraza ([facility])             - Medication management: none  Previous medication trials:    - Melatonin   - Zoloft: titrated to 75mg with improvement   - Metadate: titrated to 40mg with minimal improvement->Focalin XR   - Focalin XR   - Hydroxyzine: did not notice improvement for sleep so  self-discontinued  Previous hospitalizations:             - IPU: none             - PHP/IOP: none  Previous suicide attempts/self-injury: none  Current outpatient providers:             - Therapy: Carolyn Pedraza ([facility])    Medical and Developmental History:  Pertinent history reviewed with patient and parents with no clinically significant changes in the interim.   DEVELOPMENTAL HISTORY  Prenatal:   Non-contributory  :    (planned)  Birth weight: 7lbs 8oz  Full-term  NICU for 5 days: to ensure O2 levels were suffcient  Developmental Milestones:   Within normal limits  Temperament:   \"Easy\"  Average/Typical  Typical Discipline: taking away privileges     MEDICAL HISTORY  - Allergies: Amoxil, Cefdinir  - Head injury: none, Loss of consciousness:  No  - Seizures: none  - Heart: none  - Gastroentestinal problems: IBS  - Medical Hospitalizations (dates, locations, reasons): none  - Date of last completed physical exam: ,  Normal  - Immunizations up to date:  Yes  - Past surgical history:  No     - Pediatrician: Dr. Corrie Ray       MEDICAL PROBLEMS - RELATIVES:  - Diabetes: paternal/maternal side  - Thyroid problems: mom, MGM  - Heart: dad born with bicuspid heart valve  - No heart disease, early/sudden death, tics, headaches, or seizures reported     PSYCHIATRIC PROBLEMS - RELATIVES:  - Depression: dad (Zoloft), brother (Zoloft), PGF, paternal uncle and cousin  - Anxiety: brother (Zoloft), paternal uncel  - Substance abuse: none reported  - Suicide completion: none reported    Social History:  Pertinent history reviewed with patient and parents with no clinically significant changes in the interim.   Family Constellation  Patient lives at home with parents and brother (3 years older)      Mother: Birth, Linda Cleveland, Age: 41 years  Father: Birth, Dakotah Cleveland, Age: 43 years     Biological Siblings:   Brother: Mina (15 years old)  Legal Guardianship of Patient:  Biological  Mother and Biological Father  Highest Education Level  Father: Bachelors  Mother: Bachelors  Occupation:  Father: [employer]  Mother: works in media dept at [employer]  Confucianist Preference:  Mandaen  DCFS Involvement: no  Juvenile Court Involvement: no     SUBSTANCE ABUSE HISTORY  Alcohol: none reported  Cigarettes: none reported  Marijuana: none reported     SCHOOL HISTORY  Current school: [employer]  Grade level: 9th, Failed previous grade: No  Other school history:    Attendance:  good  Behavior:  good  - Grades: Above average  - Special education: No  - Honors/accelerated program: No  - IEP/504 plan: 504 plan     INTERESTS  Clarinet, reading, anime    MENTAL STATUS EXAM:    Behavioral Observations: alert, cooperative, polite, appropriate eye contact and normal gait and station  Appearance: neat and clean, appropriate hygiene and grooming, casually dressed, average size and appears stated age  Separation from escort: Normal  Manner of relating to examiner: cooperative  Psychomotor activity: no abnormal movements including tics, tremors, stereotypies, or catatonia  Muscle strength/tone: within normal limits  Speech: appropriate rate, rhythym, and volume  and no perseveration or paucity of language  Receptive Language: Normal  Expressive Language: Normal    Mood: \"pretty good\"  Affective expression: appeared content  Thought process: logical and coherent, no thought blocking, looseness of associations, tangential or circumstantial thinking or derailments  Though content: no delusions, obsessions/ruminations, phobias or preoccupations  Suicidal/Homicidal ideation: Absent  Perceptions: no auditory or visual hallucinations, no tactile or olfactory hallucinations, depersonalization or derealization  Orientation: person/place/time/situation  Attention/Concentration:fair  Memory: Normal  Impulse Control: Normal  Judgment: limited  Insight: partial  Intelligence/Fund of  knowledge (clinical estimate): average    SCALES/RECORDS REVIEWED: chart review    ASSESSMENT: Tamara Cleveland is a 16 year old female who presents to clinic for follow-up.     DIAGNOSIS:   Generalized Anxiety Disorder  Major Depressive Disorder  Attention-Deficit/Hyperactivity Disorder, Predominantly Inattentive Presentation  R/o Dyscalculia    THERAPY MODALITY:   Cognitive Behavioral Therapy  Supportive Therapy  Psychoeducational  TREATMENT PLAN:   - Therapy: Writer encouraged continued engagement in psychotherapy services with an emphasis on improving interpersonal communication and relationships, as well as coping skills to manage intense anxiety and depression.     - Medication management:   - Anxiety/Depression: Continue Zoloft 75 mg daily.      - ADHD: Continue Focalin XR 10 mg every morning. Will request feedback from school to further evaluate. Patient and family were advised to meet in person for the next visit with writer, in light of potential changes in [place] regulations surrounding prescriptions of controlled substances.   - Sleep: Reinforced sleep hygiene recommendations  - Writer encouraged parent to take patient to ER or call 911 if patient endorses any suicidal or homicidal ideation, expresses auditory or visual hallucination or in case of any other emergency.  Patient and parents agree to call or contact writer with any questions, concerns, or worsening in mood or behavior.  - Provided psychoeducation regarding current working diagnosis and available treatment options and answered any questions brought forth. Supportive listening and reassurance provided.   - Return to clinic: 3 months  SCALES/RECORDS ORDERED: Ella (teacher) x3    Disclaimer: This chart was completed using dragon medical voice recognition.  There may be some typographical errors in transcription due to the limitations inherent within the voice activated computer dictation.  This should not adversely affect the overall integrity  of the document.  Please contact the author with any questions regarding the contents of this note.    Electronically signed by:   Armando Coates MD

## 2025-02-12 NOTE — CONSULTS
ICU CONSULT       Hospital Day: 1  ICU Day: 1                                                         Code:Full Code  Admit Date: 2/11/2025  PCP: Dominic Manuel MD                                  CC: nausea/vomiting and cramping     HISTORY OF PRESENT ILLNESS:   Mr. Bill Fish is a 44 y.o. male with a medical hx significant for alcohol use disorder, hypertension, otherwise as listed in the MHx table below, who presented from home to the ED on 2/11/25 with nausea and vomiting.     Patient reports for the last 4 days he has had reduced appetite and has been unable to tolerate p.o. intake. Over the last 2 days patient reports that he has had constant nonbloody nonbilious emesis and is unable to keep food down.  He also endorses cramps in his arms, legs and jaw.  Today, he went out for drinks with his coworkers and had 4-5 shots of alcohol; he then began to once again vomit uncontrollably which prompted him to come to the ED. Denies any abdominal pain, diarrhea, headache, weakness, dizziness, confusion, and disorientation.  No sick contacts.     He reports he has had episodes of nausea vomiting like this in the past when he went through withdrawal however he states he has had reduced alcohol intake over the last few months after being admitted for alcohol use disorder in the past.  States he has only been drinking socially for the last few months and usually drinks only 3-4 drinks.  States he does not think he is in withdrawal right now and denies any withdrawal symptoms.        ED Course:  On arrival to the ED, /98, HR 80, RR 18, afebrile, saturating well on room air   Labs were significant for: Na 105, K 3.2, Cr 2.5, Mg 1.79, gluc 128, ALT 92, , T Jamir 1.5, WBC wnl, Hg 13.0  Imaging: none  While in the ED, he received: magnesium (2,000 mg in 50 mL) and potassium (40 mEq) replacement, 1,000 mL bolus, zofran        Interval history    Seen at bedside -- he is slightly irritated about being in

## 2025-02-12 NOTE — ED PROVIDER NOTES
ED Attending Attestation Note     Date of evaluation: 2/11/2025    This patient was seen by the advance practice provider.  I have seen and examined the patient, agree with the workup, evaluation, management and diagnosis. The care plan has been discussed.  My assessment reveals patient with N/V, no diarrhea, s/p drinking alcohol today.Labs, IVF, and antiemetics ordered and pending at time of this note.  Will make dispo based on response to treatment.     Kevin Lee MD  02/11/25 [id number]

## 2025-02-12 NOTE — PROGRESS NOTES
4 Eyes Skin Assessment     NAME:  Bill Fish  YOB: 1980  MEDICAL RECORD NUMBER:  [id number]    The patient is being assessed for  Admission    I agree that at least one RN has performed a thorough Head to Toe Skin Assessment on the patient. ALL assessment sites listed below have been assessed.      Areas assessed by both nurses:    Head, Face, Ears, Shoulders, Back, Chest, Arms, Elbows, Hands, Sacrum. Buttock, Coccyx, Ischium, and Legs. Feet and Heels      - Did not assess any abnormalities   Does the Patient have a Wound? No noted wound(s)       Boo Prevention initiated by RN: No  Wound Care Orders initiated by RN: No    Pressure Injury (Stage 3,4, Unstageable, DTI, NWPT, and Complex wounds) if present, place Wound referral order by RN under : No    New Ostomies, if present place, Ostomy referral order under : No     Nurse 1 eSignature: Electronically signed by LAUREL JUAREZ RN on 2/12/25 at 2:44 AM EST    **SHARE this note so that the co-signing nurse can place an eSignature**    Nurse 2 eSignature: Electronically signed by Anahi Nogueira RN on 2/12/25 at 5:44 AM EST

## 2025-02-12 NOTE — PROGRESS NOTES
Hyponatremia  Extremely dehydrated  Goran  Continue saline hydration  Follow sodium closely  If goes up more than 10 points in next 24 hours, give desmopressin   Formal note to follow

## 2025-02-13 ENCOUNTER — CARE COORDINATION (OUTPATIENT)
Dept: CASE MANAGEMENT | Age: 45
End: 2025-02-13

## 2025-02-13 LAB
CREAT UR-MCNC: 62.1 MG/DL (ref 39–259)
UUN UR-MCNC: 329 MG/DL (ref 800–1666)

## 2025-02-13 NOTE — PROGRESS NOTES
Lab called updated Na result of 117.     Pt signed out AMA 2047. PIV was removed, Vape removed from safe and returned to pt, and pt was escorted to ED waiting room by writer, where he stated he was ordering a Lyft ride from.

## 2025-02-13 NOTE — CARE COORDINATION
Care Transitions Note    Initial Call - Call within 2 business days of discharge: Yes    Attempted to reach patient for transitions of care follow up. Unable to reach patient.    Outreach Attempts:   Unable to leave message. No answer - voicemail is full.    Patient: Bill Fish    Patient : 1980   MRN: [id number]    Reason for Admission: Vomiting  Discharge Date: 25  RURS: Readmission Risk Score: 11.1    Last Discharge Facility       Date Complaint Diagnosis Description Type Department Provider    25 Vomiting; Nausea Hyponatremia ... ED to Hosp-Admission (Discharged) (ADMITTED) [facility] Estefany Brantley MD; Emily Lee...            Was this an external facility discharge? No    Follow Up Appointment:   Patient does not have a follow up appointment scheduled at time of call.  Currently there is no HFU appt scheduled with the PCP.  Future Appointments         Provider Specialty Dept Phone    3/3/2025 1:15 PM Dominic Manuel MD Internal Medicine [phone]            Plan for follow-up on next business day.      Mila Diaz RN BSN  Care Transition Nurse  [phone]

## 2025-02-14 ENCOUNTER — CARE COORDINATION (OUTPATIENT)
Dept: CASE MANAGEMENT | Age: 45
End: 2025-02-14

## 2025-02-14 NOTE — CARE COORDINATION
Care Transitions Note    Initial Call - Call within 2 business days of discharge: Yes    Attempted to reach patient for transitions of care follow up. Unable to reach patient.    Outreach Attempts:   Unable to leave message. No answer - mail box is full.    Patient: Bill Fish    Patient : 1980   MRN: [id number]    Reason for Admission: Vomiting  Discharge Date: 25  RURS: Readmission Risk Score: 11.1    Last Discharge Facility       Date Complaint Diagnosis Description Type Department Provider    25 Vomiting; Nausea Hyponatremia ... ED to Hosp-Admission (Discharged) (ADMITTED) [facility] ICU Estefany Brantley MD; Emily Lee...            Was this an external facility discharge? No    Follow Up Appointment:   Patient does not have a follow up appointment scheduled at time of call.  Currently there is no HFU appt scheduled with the PCP.  Future Appointments         Provider Specialty Dept Phone    3/3/2025 1:15 PM Dominic Manuel MD Internal Medicine [phone]            No further follow-up call indicated     Mila Diaz RN BSN  Care Transition Nurse  [phone]

## 2025-02-15 NOTE — DISCHARGE SUMMARY
V2.0  Discharge Summary    Name:  Bill Fish /Age/Sex: 1980 (44 y.o. male)   Admit Date: 2025  Left AMA on 2025   MRN & CSN:  [id number] & [id number]    Attending:  No att. providers found Discharging Provider: Estefany Brantley MD       Hospital Course:     44 y.o. male who presented to the ED on 2025 with nausea and vomiting, found to have severe hyponatremia, admitted to ICU for further management     See detailed PN from , he left AMA      The patient expressed appropriate understanding of, and agreement with the discharge recommendations, medications, and plan.     Consults this admission:  IP CONSULT TO CRITICAL CARE  IP CONSULT TO SOCIAL WORK  IP CONSULT TO CRITICAL CARE    Discharge Diagnosis:   See problem list in PN    Discharge Instruction:   Left AMA    Discharge Medications:        Medication List        ASK your doctor about these medications      folic acid 1 MG tablet  Commonly known as: FOLVITE  TAKE 1 TABLET BY MOUTH EVERY DAY     * metoprolol tartrate 25 MG tablet  Commonly known as: LOPRESSOR  Take 0.5 tablets by mouth 2 times daily     * metoprolol tartrate 25 MG tablet  Commonly known as: LOPRESSOR  Take 0.5 tablets by mouth 2 times daily     ondansetron 4 MG disintegrating tablet  Commonly known as: ZOFRAN-ODT  Take 1 tablet by mouth 3 times daily as needed for Nausea or Vomiting     therapeutic multivitamin-minerals tablet  Take 1 tablet by mouth daily     vitamin B-1 100 MG tablet  Commonly known as: THIAMINE  Take 1 tablet by mouth daily           * This list has 2 medication(s) that are the same as other medications prescribed for you. Read the directions carefully, and ask your doctor or other care provider to review them with you.                 Objective Findings at Discharge:   /74   Pulse 90   Temp 98.9 °F (37.2 °C)   Resp 16   Ht 1.753 m (5' 9\")   Wt 84.5 kg (186 lb 4.6 oz)   SpO2 99%   BMI 27.51 kg/m²       Physical Exam:   N/a

## 2025-02-20 NOTE — PROGRESS NOTES
Physician Progress Note      PATIENT:               JACKIE GARCIA  [facility] #:                  [id number]  :                       1980  ADMIT DATE:       2025 9:21 PM  DISCH DATE:        2025 8:50 PM  RESPONDING  PROVIDER #:        Estefany Brantley MD          QUERY TEXT:    Pt admitted with sever hyponatremia.  Noted documentation of SIADH in critical   care consult note on .  If possible, please document in progress notes   and discharge summary:    The medical record reflects the following:  Risk Factors: hyponatremia, LIZZETTE, alcohol abuse, dehydration  Clinical Indicators: >105>106>110>112>117. Consult note on : - urine   studies showing a possible SIADH picture in the setting of excessive nausea  - replete electrolytes as needed  Urine studies most consistent with SIADH  He did not improve with normal saline  He has appropriately responded to 3% saline which is now on hold disease had   an increase in sodium of 8 over the last 24 hours  He is on fluid restriction  Urine osmo 213, urine Na 40, urine chloride < 20  Treatment: NS, 3% saline, fluid restriction, Nephrology consult, Monitor Na   every 4 hours  Options provided:  -- SIADH confirmed present on admission  -- SIADH  ruled out  -- Other - I will add my own diagnosis  -- Disagree - Not applicable / Not valid  -- Disagree - Clinically unable to determine / Unknown  -- Refer to Clinical Documentation Reviewer    PROVIDER RESPONSE TEXT:    Severe hyponatremia - Secondary to hypovolemia/extreme dehydration, SIADH   ruled out    Query created by: Cat Dawn on 2025 2:20 PM      Electronically signed by:  Estefany Brantley MD 2025 2:33 PM

## 2025-04-15 ENCOUNTER — HOSPITAL ENCOUNTER (INPATIENT)
Age: 45
LOS: 1 days | Discharge: HOME | DRG: 241 | End: 2025-04-17
Attending: EMERGENCY MEDICINE | Admitting: INTERNAL MEDICINE
Payer: MEDICAID

## 2025-04-15 DIAGNOSIS — K29.20 ACUTE ALCOHOLIC GASTRITIS WITHOUT HEMORRHAGE: ICD-10-CM

## 2025-04-15 DIAGNOSIS — R11.2 NAUSEA AND VOMITING, UNSPECIFIED VOMITING TYPE: Primary | ICD-10-CM

## 2025-04-15 PROCEDURE — 93005 ELECTROCARDIOGRAM TRACING: CPT | Performed by: EMERGENCY MEDICINE

## 2025-04-15 PROCEDURE — 96375 TX/PRO/DX INJ NEW DRUG ADDON: CPT

## 2025-04-15 PROCEDURE — 6360000002 HC RX W HCPCS: Performed by: EMERGENCY MEDICINE

## 2025-04-15 PROCEDURE — 96361 HYDRATE IV INFUSION ADD-ON: CPT

## 2025-04-15 PROCEDURE — 99285 EMERGENCY DEPT VISIT HI MDM: CPT

## 2025-04-15 PROCEDURE — 2580000003 HC RX 258: Performed by: EMERGENCY MEDICINE

## 2025-04-15 RX ORDER — ONDANSETRON 2 MG/ML
4 INJECTION INTRAMUSCULAR; INTRAVENOUS ONCE
Status: COMPLETED | OUTPATIENT
Start: 2025-04-15 | End: 2025-04-15

## 2025-04-15 RX ORDER — SODIUM CHLORIDE, SODIUM LACTATE, POTASSIUM CHLORIDE, AND CALCIUM CHLORIDE .6; .31; .03; .02 G/100ML; G/100ML; G/100ML; G/100ML
1000 INJECTION, SOLUTION INTRAVENOUS ONCE
Status: COMPLETED | OUTPATIENT
Start: 2025-04-15 | End: 2025-04-16

## 2025-04-15 RX ADMIN — ONDANSETRON 4 MG: 2 INJECTION, SOLUTION INTRAMUSCULAR; INTRAVENOUS at 23:44

## 2025-04-15 RX ADMIN — SODIUM CHLORIDE, SODIUM LACTATE, POTASSIUM CHLORIDE, AND CALCIUM CHLORIDE 1000 ML: .6; .31; .03; .02 INJECTION, SOLUTION INTRAVENOUS at 23:43

## 2025-04-15 ASSESSMENT — PAIN DESCRIPTION - DESCRIPTORS: DESCRIPTORS: CRAMPING

## 2025-04-15 ASSESSMENT — LIFESTYLE VARIABLES
HOW MANY STANDARD DRINKS CONTAINING ALCOHOL DO YOU HAVE ON A TYPICAL DAY: 1 OR 2
HOW OFTEN DO YOU HAVE A DRINK CONTAINING ALCOHOL: 4 OR MORE TIMES A WEEK

## 2025-04-15 ASSESSMENT — PAIN DESCRIPTION - LOCATION: LOCATION: LEG

## 2025-04-15 ASSESSMENT — PAIN DESCRIPTION - ORIENTATION: ORIENTATION: LEFT;RIGHT

## 2025-04-15 ASSESSMENT — PAIN - FUNCTIONAL ASSESSMENT: PAIN_FUNCTIONAL_ASSESSMENT: 0-10

## 2025-04-16 PROBLEM — R11.2 INTRACTABLE NAUSEA AND VOMITING: Status: ACTIVE | Noted: 2025-04-16

## 2025-04-16 LAB
ALBUMIN SERPL-MCNC: 4.8 G/DL (ref 3.4–5)
ALBUMIN/GLOB SERPL: 1.2 {RATIO} (ref 1.1–2.2)
ALP SERPL-CCNC: 123 U/L (ref 40–129)
ALT SERPL-CCNC: 92 U/L (ref 10–40)
ANION GAP SERPL CALCULATED.3IONS-SCNC: 20 MMOL/L (ref 3–16)
ANION GAP SERPL CALCULATED.3IONS-SCNC: 21 MMOL/L (ref 3–16)
AST SERPL-CCNC: 137 U/L (ref 15–37)
BASE EXCESS BLDV CALC-SCNC: 8.4 MMOL/L (ref -2–3)
BASOPHILS # BLD: 0 K/UL (ref 0–0.2)
BASOPHILS # BLD: 0.1 K/UL (ref 0–0.2)
BASOPHILS NFR BLD: 1.3 %
BASOPHILS NFR BLD: 1.6 %
BILIRUB SERPL-MCNC: 0.6 MG/DL (ref 0–1)
BUN SERPL-MCNC: 18 MG/DL (ref 7–20)
BUN SERPL-MCNC: 20 MG/DL (ref 7–20)
CALCIUM SERPL-MCNC: 10 MG/DL (ref 8.3–10.6)
CALCIUM SERPL-MCNC: 9.5 MG/DL (ref 8.3–10.6)
CHLORIDE SERPL-SCNC: 87 MMOL/L (ref 99–110)
CHLORIDE SERPL-SCNC: 93 MMOL/L (ref 99–110)
CO2 BLDV-SCNC: 35 MMOL/L
CO2 SERPL-SCNC: 26 MMOL/L (ref 21–32)
CO2 SERPL-SCNC: 28 MMOL/L (ref 21–32)
COHGB MFR BLDV: 1.6 % (ref 0–1.5)
CREAT SERPL-MCNC: 1.4 MG/DL (ref 0.9–1.3)
CREAT SERPL-MCNC: 1.5 MG/DL (ref 0.9–1.3)
DEPRECATED RDW RBC AUTO: 20.1 % (ref 12.4–15.4)
DEPRECATED RDW RBC AUTO: 20.8 % (ref 12.4–15.4)
DO-HGB MFR BLDV: 24.5 %
EKG ATRIAL RATE: 100 BPM
EKG DIAGNOSIS: NORMAL
EKG P AXIS: 58 DEGREES
EKG P-R INTERVAL: 162 MS
EKG Q-T INTERVAL: 338 MS
EKG QRS DURATION: 90 MS
EKG QTC CALCULATION (BAZETT): 436 MS
EKG R AXIS: 79 DEGREES
EKG T AXIS: 86 DEGREES
EKG VENTRICULAR RATE: 100 BPM
EOSINOPHIL # BLD: 0 K/UL (ref 0–0.6)
EOSINOPHIL # BLD: 0 K/UL (ref 0–0.6)
EOSINOPHIL NFR BLD: 0.6 %
EOSINOPHIL NFR BLD: 1.6 %
ETHANOLAMINE SERPL-MCNC: 201 MG/DL (ref 0–0.08)
GFR SERPLBLD CREATININE-BSD FMLA CKD-EPI: 59 ML/MIN/{1.73_M2}
GFR SERPLBLD CREATININE-BSD FMLA CKD-EPI: 64 ML/MIN/{1.73_M2}
GLUCOSE SERPL-MCNC: 136 MG/DL (ref 70–99)
GLUCOSE SERPL-MCNC: 88 MG/DL (ref 70–99)
HCO3 BLDV-SCNC: 33.2 MMOL/L (ref 24–28)
HCT VFR BLD AUTO: 32.4 % (ref 40.5–52.5)
HCT VFR BLD AUTO: 34.2 % (ref 40.5–52.5)
HGB BLD-MCNC: 10.9 G/DL (ref 13.5–17.5)
HGB BLD-MCNC: 11.8 G/DL (ref 13.5–17.5)
LIPASE SERPL-CCNC: 70 U/L (ref 13–60)
LYMPHOCYTES # BLD: 1.2 K/UL (ref 1–5.1)
LYMPHOCYTES # BLD: 1.2 K/UL (ref 1–5.1)
LYMPHOCYTES NFR BLD: 29.3 %
LYMPHOCYTES NFR BLD: 41.8 %
MAGNESIUM SERPL-MCNC: 1.96 MG/DL (ref 1.8–2.4)
MCH RBC QN AUTO: 31.9 PG (ref 26–34)
MCH RBC QN AUTO: 32 PG (ref 26–34)
MCHC RBC AUTO-ENTMCNC: 33.7 G/DL (ref 31–36)
MCHC RBC AUTO-ENTMCNC: 34.5 G/DL (ref 31–36)
MCV RBC AUTO: 92.3 FL (ref 80–100)
MCV RBC AUTO: 94.9 FL (ref 80–100)
METHGB MFR BLDV: 0.4 % (ref 0–1.5)
MONOCYTES # BLD: 0.2 K/UL (ref 0–1.3)
MONOCYTES # BLD: 0.6 K/UL (ref 0–1.3)
MONOCYTES NFR BLD: 15.2 %
MONOCYTES NFR BLD: 8.3 %
NEUTROPHILS # BLD: 1.4 K/UL (ref 1.7–7.7)
NEUTROPHILS # BLD: 2.2 K/UL (ref 1.7–7.7)
NEUTROPHILS NFR BLD: 46.7 %
NEUTROPHILS NFR BLD: 53.6 %
PCO2 BLDV: 46.1 MMHG (ref 41–51)
PH BLDV: 7.47 [PH] (ref 7.35–7.45)
PHOSPHATE SERPL-MCNC: 2.7 MG/DL (ref 2.5–4.9)
PLATELET # BLD AUTO: 188 K/UL (ref 135–450)
PLATELET # BLD AUTO: 206 K/UL (ref 135–450)
PMV BLD AUTO: 7 FL (ref 5–10.5)
PMV BLD AUTO: 7.6 FL (ref 5–10.5)
PO2 BLDV: 43.2 MMHG (ref 25–40)
POTASSIUM SERPL-SCNC: 4 MMOL/L (ref 3.5–5.1)
POTASSIUM SERPL-SCNC: 4.2 MMOL/L (ref 3.5–5.1)
PROT SERPL-MCNC: 8.9 G/DL (ref 6.4–8.2)
RBC # BLD AUTO: 3.41 M/UL (ref 4.2–5.9)
RBC # BLD AUTO: 3.71 M/UL (ref 4.2–5.9)
SAO2 % BLDV: 75 %
SODIUM SERPL-SCNC: 136 MMOL/L (ref 136–145)
SODIUM SERPL-SCNC: 139 MMOL/L (ref 136–145)
WBC # BLD AUTO: 2.9 K/UL (ref 4–11)
WBC # BLD AUTO: 4 K/UL (ref 4–11)

## 2025-04-16 PROCEDURE — 6360000002 HC RX W HCPCS: Performed by: INTERNAL MEDICINE

## 2025-04-16 PROCEDURE — 96376 TX/PRO/DX INJ SAME DRUG ADON: CPT

## 2025-04-16 PROCEDURE — 96375 TX/PRO/DX INJ NEW DRUG ADDON: CPT

## 2025-04-16 PROCEDURE — 2500000003 HC RX 250 WO HCPCS: Performed by: EMERGENCY MEDICINE

## 2025-04-16 PROCEDURE — 82803 BLOOD GASES ANY COMBINATION: CPT

## 2025-04-16 PROCEDURE — 2580000003 HC RX 258: Performed by: INTERNAL MEDICINE

## 2025-04-16 PROCEDURE — 36415 COLL VENOUS BLD VENIPUNCTURE: CPT

## 2025-04-16 PROCEDURE — 80053 COMPREHEN METABOLIC PANEL: CPT

## 2025-04-16 PROCEDURE — 85025 COMPLETE CBC W/AUTO DIFF WBC: CPT

## 2025-04-16 PROCEDURE — 83735 ASSAY OF MAGNESIUM: CPT

## 2025-04-16 PROCEDURE — 82077 ASSAY SPEC XCP UR&BREATH IA: CPT

## 2025-04-16 PROCEDURE — 6360000002 HC RX W HCPCS: Performed by: EMERGENCY MEDICINE

## 2025-04-16 PROCEDURE — 96366 THER/PROPH/DIAG IV INF ADDON: CPT

## 2025-04-16 PROCEDURE — 83690 ASSAY OF LIPASE: CPT

## 2025-04-16 PROCEDURE — 96365 THER/PROPH/DIAG IV INF INIT: CPT

## 2025-04-16 PROCEDURE — 6370000000 HC RX 637 (ALT 250 FOR IP): Performed by: INTERNAL MEDICINE

## 2025-04-16 PROCEDURE — 1200000000 HC SEMI PRIVATE

## 2025-04-16 PROCEDURE — 2580000003 HC RX 258: Performed by: EMERGENCY MEDICINE

## 2025-04-16 PROCEDURE — 84100 ASSAY OF PHOSPHORUS: CPT

## 2025-04-16 RX ORDER — POTASSIUM CHLORIDE 1500 MG/1
40 TABLET, EXTENDED RELEASE ORAL PRN
Status: DISCONTINUED | OUTPATIENT
Start: 2025-04-16 | End: 2025-04-17 | Stop reason: HOSPADM

## 2025-04-16 RX ORDER — METOPROLOL TARTRATE 25 MG/1
12.5 TABLET, FILM COATED ORAL 2 TIMES DAILY
Status: DISCONTINUED | OUTPATIENT
Start: 2025-04-16 | End: 2025-04-17 | Stop reason: HOSPADM

## 2025-04-16 RX ORDER — LORAZEPAM 1 MG/1
1 TABLET ORAL
Status: DISCONTINUED | OUTPATIENT
Start: 2025-04-16 | End: 2025-04-17 | Stop reason: HOSPADM

## 2025-04-16 RX ORDER — LORAZEPAM 2 MG/ML
1 INJECTION INTRAMUSCULAR
Status: DISCONTINUED | OUTPATIENT
Start: 2025-04-16 | End: 2025-04-17 | Stop reason: HOSPADM

## 2025-04-16 RX ORDER — LORAZEPAM 2 MG/ML
4 INJECTION INTRAMUSCULAR
Status: DISCONTINUED | OUTPATIENT
Start: 2025-04-16 | End: 2025-04-17 | Stop reason: HOSPADM

## 2025-04-16 RX ORDER — LORAZEPAM 2 MG/ML
2 INJECTION INTRAMUSCULAR
Status: DISCONTINUED | OUTPATIENT
Start: 2025-04-16 | End: 2025-04-17 | Stop reason: HOSPADM

## 2025-04-16 RX ORDER — LORAZEPAM 1 MG/1
3 TABLET ORAL
Status: DISCONTINUED | OUTPATIENT
Start: 2025-04-16 | End: 2025-04-17 | Stop reason: HOSPADM

## 2025-04-16 RX ORDER — FOLIC ACID 1 MG/1
1000 TABLET ORAL DAILY
Status: DISCONTINUED | OUTPATIENT
Start: 2025-04-16 | End: 2025-04-17 | Stop reason: HOSPADM

## 2025-04-16 RX ORDER — SODIUM CHLORIDE 9 MG/ML
INJECTION, SOLUTION INTRAVENOUS CONTINUOUS
Status: ACTIVE | OUTPATIENT
Start: 2025-04-16 | End: 2025-04-17

## 2025-04-16 RX ORDER — ACETAMINOPHEN 325 MG/1
650 TABLET ORAL EVERY 6 HOURS PRN
Status: DISCONTINUED | OUTPATIENT
Start: 2025-04-16 | End: 2025-04-17 | Stop reason: HOSPADM

## 2025-04-16 RX ORDER — ONDANSETRON 2 MG/ML
4 INJECTION INTRAMUSCULAR; INTRAVENOUS EVERY 6 HOURS PRN
Status: DISCONTINUED | OUTPATIENT
Start: 2025-04-16 | End: 2025-04-17 | Stop reason: HOSPADM

## 2025-04-16 RX ORDER — MAGNESIUM SULFATE IN WATER 40 MG/ML
2000 INJECTION, SOLUTION INTRAVENOUS PRN
Status: DISCONTINUED | OUTPATIENT
Start: 2025-04-16 | End: 2025-04-17 | Stop reason: HOSPADM

## 2025-04-16 RX ORDER — ONDANSETRON 2 MG/ML
4 INJECTION INTRAMUSCULAR; INTRAVENOUS ONCE
Status: COMPLETED | OUTPATIENT
Start: 2025-04-16 | End: 2025-04-16

## 2025-04-16 RX ORDER — ONDANSETRON 4 MG/1
4 TABLET, ORALLY DISINTEGRATING ORAL EVERY 8 HOURS PRN
Status: DISCONTINUED | OUTPATIENT
Start: 2025-04-16 | End: 2025-04-17 | Stop reason: HOSPADM

## 2025-04-16 RX ORDER — LORAZEPAM 1 MG/1
4 TABLET ORAL
Status: DISCONTINUED | OUTPATIENT
Start: 2025-04-16 | End: 2025-04-17 | Stop reason: HOSPADM

## 2025-04-16 RX ORDER — LORAZEPAM 1 MG/1
2 TABLET ORAL
Status: DISCONTINUED | OUTPATIENT
Start: 2025-04-16 | End: 2025-04-17 | Stop reason: HOSPADM

## 2025-04-16 RX ORDER — ACETAMINOPHEN 650 MG/1
650 SUPPOSITORY RECTAL EVERY 6 HOURS PRN
Status: DISCONTINUED | OUTPATIENT
Start: 2025-04-16 | End: 2025-04-17 | Stop reason: HOSPADM

## 2025-04-16 RX ORDER — M-VIT,TX,IRON,MINS/CALC/FOLIC 27MG-0.4MG
1 TABLET ORAL DAILY
Status: DISCONTINUED | OUTPATIENT
Start: 2025-04-16 | End: 2025-04-17 | Stop reason: HOSPADM

## 2025-04-16 RX ORDER — ENOXAPARIN SODIUM 100 MG/ML
40 INJECTION SUBCUTANEOUS DAILY
Status: DISCONTINUED | OUTPATIENT
Start: 2025-04-16 | End: 2025-04-17 | Stop reason: HOSPADM

## 2025-04-16 RX ORDER — POLYETHYLENE GLYCOL 3350 17 G/17G
17 POWDER, FOR SOLUTION ORAL DAILY PRN
Status: DISCONTINUED | OUTPATIENT
Start: 2025-04-16 | End: 2025-04-17 | Stop reason: HOSPADM

## 2025-04-16 RX ORDER — POTASSIUM CHLORIDE 7.45 MG/ML
10 INJECTION INTRAVENOUS PRN
Status: DISCONTINUED | OUTPATIENT
Start: 2025-04-16 | End: 2025-04-17 | Stop reason: HOSPADM

## 2025-04-16 RX ORDER — GAUZE BANDAGE 2" X 2"
100 BANDAGE TOPICAL DAILY
Status: DISCONTINUED | OUTPATIENT
Start: 2025-04-16 | End: 2025-04-17 | Stop reason: HOSPADM

## 2025-04-16 RX ORDER — LORAZEPAM 2 MG/ML
3 INJECTION INTRAMUSCULAR
Status: DISCONTINUED | OUTPATIENT
Start: 2025-04-16 | End: 2025-04-17 | Stop reason: HOSPADM

## 2025-04-16 RX ADMIN — FAMOTIDINE 20 MG: 10 INJECTION, SOLUTION INTRAVENOUS at 03:36

## 2025-04-16 RX ADMIN — Medication 1 TABLET: at 08:03

## 2025-04-16 RX ADMIN — FOLIC ACID 1000 MCG: 1 TABLET ORAL at 08:03

## 2025-04-16 RX ADMIN — Medication 100 MG: at 08:03

## 2025-04-16 RX ADMIN — SODIUM CHLORIDE, PRESERVATIVE FREE 40 MG: 5 INJECTION INTRAVENOUS at 17:09

## 2025-04-16 RX ADMIN — METOPROLOL 12.5 MG: 25 TABLET ORAL at 08:03

## 2025-04-16 RX ADMIN — THIAMINE HYDROCHLORIDE: 100 INJECTION, SOLUTION INTRAMUSCULAR; INTRAVENOUS at 01:33

## 2025-04-16 RX ADMIN — SODIUM CHLORIDE: 0.9 INJECTION, SOLUTION INTRAVENOUS at 19:55

## 2025-04-16 RX ADMIN — METOPROLOL 12.5 MG: 25 TABLET ORAL at 19:52

## 2025-04-16 RX ADMIN — SODIUM CHLORIDE: 0.9 INJECTION, SOLUTION INTRAVENOUS at 06:16

## 2025-04-16 RX ADMIN — SODIUM CHLORIDE, PRESERVATIVE FREE 40 MG: 5 INJECTION INTRAVENOUS at 06:08

## 2025-04-16 RX ADMIN — ONDANSETRON 4 MG: 2 INJECTION, SOLUTION INTRAMUSCULAR; INTRAVENOUS at 03:34

## 2025-04-16 NOTE — PLAN OF CARE
Problem: Discharge Planning  Goal: Discharge to home or other facility with appropriate resources  Outcome: Progressing  Flowsheets (Taken 4/16/2025 1703)  Discharge to home or other facility with appropriate resources:   Identify barriers to discharge with patient and caregiver   Identify discharge learning needs (meds, wound care, etc)     Problem: Safety - Adult  Goal: Free from fall injury  4/16/2025 1703 by Debbi Ray RN  Outcome: Progressing  Flowsheets (Taken 4/16/2025 1703)  Free From Fall Injury: Instruct family/caregiver on patient safety  Note: Patient in lowest position, bed breaks locked, bed alarm in placed, call light within reach, and encouraged to call for assistance. Video monitoring in placed for safety. Seizure precautions     Problem: Cardiovascular - Adult  Goal: Maintains optimal cardiac output and hemodynamic stability  4/16/2025 1703 by Debbi Ray RN  Outcome: Progressing  Flowsheets (Taken 4/16/2025 1703)  Maintains optimal cardiac output and hemodynamic stability: Monitor blood pressure and heart rate     Problem: Gastrointestinal - Adult  Goal: Minimal or absence of nausea and vomiting  4/16/2025 1703 by Debbi Ray RN  Outcome: Progressing  Flowsheets (Taken 4/16/2025 1703)  Minimal or absence of nausea and vomiting:   Administer IV fluids as ordered to ensure adequate hydration   Administer ordered antiemetic medications as needed   Advance diet as tolerated, if ordered   Provide nonpharmacologic comfort measures as appropriate     Problem: ABCDS Injury Assessment  Goal: Absence of physical injury  Outcome: Progressing  Flowsheets (Taken 4/16/2025 1703)  Absence of Physical Injury: Implement safety measures based on patient assessment     Problem: Pain  Goal: Verbalizes/displays adequate comfort level or baseline comfort level  Outcome: Progressing  Flowsheets (Taken 4/16/2025 1705)  Verbalizes/displays adequate comfort level or baseline comfort level:   Encourage

## 2025-04-16 NOTE — DISCHARGE INSTRUCTIONS
Alcohol and Substance Abuse Community Resources:    Information and Referrals:  [facility] ([facility]) 24 hours/ 7days - [phone]  [facility] [place] (24/7 hotline)- 262.200.7317([place]); 152.762.9068 ([place])  www.addictionservicescouncil.org;  [address], [place] [address]  JANE Alexander II (Treatment consultant) - [phone]   (www.[employer]) or email: [email or web address]  JANE Malone () - [phone]    Self Help Resources:  Alcoholics Anonymous Hotline (www.[employer] [place])  (24 hours/ 7days) - 653.481.3332    [address] [facility] [address](enter on [facility]) [place], [place] [address]  [facility] [place] (www.[facility].org)- for [place] - 2-919-603-7492  Al-ANON . [place]/ [place] AL-ANON Information Services - 0-035-074-8904  (www.[employer].org)   Narcotics Anonymous (www.[employer])  - 796.879.5500  [facility] (www.smartrecovery.org) - 681.479.8836    Suicide Hotlines: toll free and available 24/7  270-763-CARE (2273)  9-957-RNYWSID (1-512.561.6217)  1-636-879-TALK (1-617-388-TALK) - [facility] Crisis Line  TTY: 0-169-433-4TTY    Detoxification Services/ Inpatient Treatment/ Residential Treatment Programs:  [facility] - [phone] [address], [place] [address]  [facility] ([facility]) - [phone]  [address], [place] [address]  [facility] - [phone]  [address], [place] [address]  [facility] -1-734-129-0621  [address] [place], [place] [address] [place] [address] ([facility]) - [phone] ext. [phone]  [address] [place] [address] [facility] - 9-631-046-4132 or 2-785-991-CARE  [address] [place] [address] [address] [place] [address] [facility] [place] (www.[facility]) - [phone]  [address]

## 2025-04-16 NOTE — H&P
prophylaxis    Physical Exam Performed:      BP (!) 143/89   Pulse (!) 115   Temp 99.1 °F (37.3 °C) (Oral)   Resp 16   Ht 1.753 m (5' 9\")   Wt 80.7 kg (177 lb 14.6 oz)   SpO2 96%   BMI 26.27 kg/m²     General appearance:  No apparent distress, appears stated age and cooperative.  HEENT:  Pupils equal, round, and reactive to light. Conjunctivae/corneas clear.  Respiratory:  Normal respiratory effort. Clear to auscultation, bilaterally without Rales/Wheezes/Rhonchi.  Cardiovascular:  Regular rate and rhythm with normal S1/S2 without murmurs, rubs or gallops.  Abdomen:  Soft, non-tender, non-distended with normal bowel sounds.  Musculoskelatal:  No clubbing, cyanosis or edema bilaterally.  Full range of motion without deformity.  Neurologic:  Neurovascularly intact without any focal sensory/motor deficits. Cranial nerves: II-XII intact, grossly non-focal.  Psychiatric:  Alert and oriented, thought content appropriate, normal insight  Skin:  Skin color, texture, turgor normal.  No rashes or lesions.  Capillary Refill:  Brisk,< 3 seconds   Peripheral Pulses:  +2 palpable, equal bilaterally         --------------------------------------------------------------------------------------------------------------------------------------------------------------------    Imaging:     No results found.    PCP: Dominic Manuel MD    Past Medical History:        Diagnosis Date    Alcohol abuse     Anxiety     Hypertension        Past Surgical History:    No past surgical history on file.    Medications Prior to Admission:   Prior to Admission medications    Medication Sig Start Date End Date Taking? Authorizing Provider   metoprolol tartrate (LOPRESSOR) 25 MG tablet Take 0.5 tablets by mouth 2 times daily 8/16/24  Yes Marge Bliss MD   Multiple Vitamins-Minerals (THERAPEUTIC MULTIVITAMIN-MINERALS) tablet Take 1 tablet by mouth daily 8/16/24  Yes Marge Bliss MD   metoprolol tartrate (LOPRESSOR) 25 MG

## 2025-04-16 NOTE — ED PROVIDER NOTES
[facility]  EMERGENCY DEPARTMENT ENCOUNTER          ATTENDING PHYSICIAN NOTE       Date of evaluation: 4/15/2025    Chief Complaint     Nausea (Pt presents to the ED with nausea and vomiting for the past 2 days. PT states that he's been having cramping as well, thinking its dehydration. )      History of Present Illness     Bill Fish is a 45 y.o. male who presents to the emergency room today complaining of nausea vomiting and feeling unwell.  Patient has a history of alcohol use disorder, had a relapse and has been drinking more heavily over the past few months, over the last few days he has noticed a crampy upper abdominal discomfort and now nonbloody nonbilious vomiting over the past few days has been unable to keep anything down endorses some generalized muscle cramping.  Patient expresses a desire to get help with his alcohol use disorder states his last drink was around 2 PM today.    Review of Systems     Review of Systems  All systems were reviewed with the patient/family and negative except as otherwise documented in the HPI.      Past Medical, Surgical, Family, and Social History     He has a past medical history of Alcohol abuse, Anxiety, and Hypertension.  He has no past surgical history on file.  His family history includes Colon Cancer in his father; Glaucoma in his father.  He reports that he has been smoking e-cigarettes and cigarettes. He has never used smokeless tobacco. He reports current alcohol use. He reports that he does not currently use drugs.    Medications     Previous Medications    FOLIC ACID (FOLVITE) 1 MG TABLET    TAKE 1 TABLET BY MOUTH EVERY DAY    METOPROLOL TARTRATE (LOPRESSOR) 25 MG TABLET    Take 0.5 tablets by mouth 2 times daily    METOPROLOL TARTRATE (LOPRESSOR) 25 MG TABLET    Take 0.5 tablets by mouth 2 times daily    MULTIPLE VITAMINS-MINERALS (THERAPEUTIC MULTIVITAMIN-MINERALS) TABLET    Take 1 tablet by mouth daily    ONDANSETRON (ZOFRAN-ODT) 4 MG

## 2025-04-16 NOTE — CASE COMMUNICATION
Case Management Assessment  Initial Evaluation    Date/Time of Evaluation: 4/16/2025 11:31 AM  Assessment Completed by: Rhina Santos    If patient is discharged prior to next notation, then this note serves as note for discharge by case management.    Patient Name: Bill Fish                   YOB: 1980  Diagnosis: Intractable nausea and vomiting [R11.2]  Acute alcoholic gastritis without hemorrhage [K29.20]  Nausea and vomiting, unspecified vomiting type [R11.2]                   Date / Time: 4/15/2025 11:09 PM    Patient Admission Status: Inpatient   Readmission Risk (Low < 19, Mod (19-27), High > 27): Readmission Risk Score: 15.3    Current PCP: Dominic Manuel MD  PCP verified by CM? Yes    Chart Reviewed: Yes      History Provided by: Patient, Medical Record  Patient Orientation: Alert and Oriented    Patient Cognition: Alert    Hospitalization in the last 30 days (Readmission):  No    If yes, Readmission Assessment in CM Navigator will be completed.    Advance Directives:      Code Status: Full Code   Patient's Primary Decision Maker is:        Discharge Planning:    Patient lives with: Children Type of Home: House  Primary Care Giver: Self  Patient Support Systems include: Children, Family Members   Current Financial resources: Medicaid  Current community resources:    Current services prior to admission: None            Current DME:              Type of Home Care services:  None    ADLS  Prior functional level: Independent in ADLs/IADLs  Current functional level: Independent in ADLs/IADLs    PT AM-PAC:   /24  OT AM-PAC:   /24    Family can provide assistance at DC: Other (comment) (Unknown)  Would you like Case Management to discuss the discharge plan with any other family members/significant others, and if so, who?    Plans to Return to Present Housing: Unknown at present  Other Identified Issues/Barriers to RETURNING to current housing: medical complications  Potential Assistance

## 2025-04-16 NOTE — PLAN OF CARE
Problem: Gastrointestinal - Adult  Goal: Minimal or absence of nausea and vomiting  Outcome: Progressing  Flowsheets (Taken 4/16/2025 0738)  Minimal or absence of nausea and vomiting:   Administer IV fluids as ordered to ensure adequate hydration   Provide nonpharmacologic comfort measures as appropriate  Note: Pt is nauseous. Pt given ginger ale. IV fluids infusing. Will continue to monitor.      Problem: Cardiovascular - Adult  Goal: Maintains optimal cardiac output and hemodynamic stability  Outcome: Progressing  Flowsheets (Taken 4/16/2025 0738)  Maintains optimal cardiac output and hemodynamic stability:   Monitor blood pressure and heart rate   Assess for signs of decreased cardiac output  Note: Pt's VSS. ST on telemetry. Will continue to monitor.      Problem: Safety - Adult  Goal: Free from fall injury  Outcome: Progressing  Flowsheets (Taken 4/16/2025 0738)  Free From Fall Injury: Instruct family/caregiver on patient safety  Note: Fall precautions are in place. Bed alarm is on and in lowest position. Pt is using call light appropriately. Will continue to monitor.

## 2025-04-16 NOTE — ED NOTES
Pt is sleeping, RR even and non labored, no visible signs of distress. VSS. Bed locked and in lowest position, call light within reach, will cont to monitor     Marge Montague, GRETA  04/16/25 6817

## 2025-04-16 NOTE — PROGRESS NOTES
Pt admitted to floor and placed on telemetry. Pt refused 4 eye assessment.     4 Eyes Skin Assessment     NAME:  Bill Fish  YOB: 1980  MEDICAL RECORD NUMBER:  [id number]    The patient is being assessed for  Admission    Pt refused assessment    I agree that at least one RN has performed a thorough Head to Toe Skin Assessment on the patient. ALL assessment sites listed below have been assessed.      Areas assessed by both nurses:    Other Pt refused assessment        Does the Patient have a Wound? No noted wound(s)       Boo Prevention initiated by RN: No  Wound Care Orders initiated by RN: No    Pressure Injury (Stage 3,4, Unstageable, DTI, NWPT, and Complex wounds) if present, place Wound referral order by RN under : No    New Ostomies, if present place, Ostomy referral order under : No     Nurse 1 eSignature: Electronically signed by Vicky Baer RN on 4/16/25 at 6:58 AM EDT    **SHARE this note so that the co-signing nurse can place an eSignature**    Nurse 2 eSignature: {Esignature:[id number]}

## 2025-04-16 NOTE — ED NOTES
Patient Name: Bill Fish  : 1980 45 y.o.  MRN: [id number]  ED Room #: A12/A12-12     Chief complaint:   Chief Complaint   Patient presents with    Nausea     Pt presents to the ED with nausea and vomiting for the past 2 days. PT states that he's been having cramping as well, thinking its dehydration.      Hospital Problem/Diagnosis:       O2 Flow Rate:O2 Device: None (Room air)   (if applicable)  Cardiac Rhythm:   (if applicable)  Active LDA's:   Peripheral IV 04/15/25 Right Antecubital (Active)            How does patient ambulate? Stand by assist    2. How does patient take pills? Whole with Water    3. Is patient alert? Alert    4. Is patient oriented? To Person, To Place, To Time, To Situation, and Follows Commands    5.   Patient arrived from:  home  Facility Name: ___________________________________________    6. If patient is disoriented or from a Skill Nursing Facility has family been notified of admission? No    7. Patient belongings? Belongings: Cell Phone and Clothing    Disposition of belongings? Kept with Patient     8. Any specific patient or family belongings/needs/dynamics?   a.     9. Miscellaneous comments/pending orders?  a.       If there are any additional questions please reach out to the Emergency Department.      Donald Todd RN  [phone]

## 2025-04-17 VITALS
DIASTOLIC BLOOD PRESSURE: 99 MMHG | HEIGHT: 69 IN | HEART RATE: 77 BPM | WEIGHT: 181.44 LBS | OXYGEN SATURATION: 98 % | BODY MASS INDEX: 26.87 KG/M2 | SYSTOLIC BLOOD PRESSURE: 144 MMHG | TEMPERATURE: 98.6 F | RESPIRATION RATE: 16 BRPM

## 2025-04-17 LAB
ANION GAP SERPL CALCULATED.3IONS-SCNC: 10 MMOL/L (ref 3–16)
BUN SERPL-MCNC: 13 MG/DL (ref 7–20)
CALCIUM SERPL-MCNC: 9.1 MG/DL (ref 8.3–10.6)
CHLORIDE SERPL-SCNC: 95 MMOL/L (ref 99–110)
CO2 SERPL-SCNC: 28 MMOL/L (ref 21–32)
CREAT SERPL-MCNC: 1.3 MG/DL (ref 0.9–1.3)
GFR SERPLBLD CREATININE-BSD FMLA CKD-EPI: 70 ML/MIN/{1.73_M2}
GLUCOSE SERPL-MCNC: 106 MG/DL (ref 70–99)
MAGNESIUM SERPL-MCNC: 1.62 MG/DL (ref 1.8–2.4)
PHOSPHATE SERPL-MCNC: 2.2 MG/DL (ref 2.5–4.9)
POTASSIUM SERPL-SCNC: 3.8 MMOL/L (ref 3.5–5.1)
SODIUM SERPL-SCNC: 133 MMOL/L (ref 136–145)

## 2025-04-17 PROCEDURE — 83735 ASSAY OF MAGNESIUM: CPT

## 2025-04-17 PROCEDURE — 2580000003 HC RX 258: Performed by: INTERNAL MEDICINE

## 2025-04-17 PROCEDURE — 80048 BASIC METABOLIC PNL TOTAL CA: CPT

## 2025-04-17 PROCEDURE — 36415 COLL VENOUS BLD VENIPUNCTURE: CPT

## 2025-04-17 PROCEDURE — 6370000000 HC RX 637 (ALT 250 FOR IP): Performed by: INTERNAL MEDICINE

## 2025-04-17 PROCEDURE — 6360000002 HC RX W HCPCS: Performed by: INTERNAL MEDICINE

## 2025-04-17 PROCEDURE — 84100 ASSAY OF PHOSPHORUS: CPT

## 2025-04-17 RX ORDER — ONDANSETRON 4 MG/1
4 TABLET, ORALLY DISINTEGRATING ORAL 3 TIMES DAILY PRN
Qty: 21 TABLET | Refills: 0 | Status: SHIPPED | OUTPATIENT
Start: 2025-04-17

## 2025-04-17 RX ADMIN — SODIUM CHLORIDE, PRESERVATIVE FREE 40 MG: 5 INJECTION INTRAVENOUS at 05:32

## 2025-04-17 RX ADMIN — ONDANSETRON 4 MG: 4 TABLET, ORALLY DISINTEGRATING ORAL at 02:45

## 2025-04-17 RX ADMIN — METOPROLOL 12.5 MG: 25 TABLET ORAL at 07:59

## 2025-04-17 RX ADMIN — Medication 1 TABLET: at 07:59

## 2025-04-17 RX ADMIN — FOLIC ACID 1000 MCG: 1 TABLET ORAL at 07:59

## 2025-04-17 RX ADMIN — MAGNESIUM SULFATE HEPTAHYDRATE 2000 MG: 40 INJECTION, SOLUTION INTRAVENOUS at 06:38

## 2025-04-17 RX ADMIN — Medication 100 MG: at 07:59

## 2025-04-17 NOTE — PLAN OF CARE
Problem: Discharge Planning  Goal: Discharge to home or other facility with appropriate resources  4/17/2025 0653 by Josue Bernard RN  Outcome: Progressing  4/16/2025 1703 by Debbi Ray RN  Outcome: Progressing  Flowsheets (Taken 4/16/2025 1703)  Discharge to home or other facility with appropriate resources:   Identify barriers to discharge with patient and caregiver   Identify discharge learning needs (meds, wound care, etc)     Problem: Safety - Adult  Goal: Free from fall injury  4/17/2025 0653 by Josue Bernard RN  Outcome: Progressing  4/16/2025 1703 by Debbi Ray RN  Outcome: Progressing  Flowsheets (Taken 4/16/2025 1703)  Free From Fall Injury: Instruct family/caregiver on patient safety  Note: Patient in lowest position, bed breaks locked, bed alarm in placed, call light within reach, and encouraged to call for assistance. Video monitoring in placed for safety. Seizure precautions     Problem: Cardiovascular - Adult  Goal: Maintains optimal cardiac output and hemodynamic stability  4/17/2025 0653 by Josue Bernard RN  Outcome: Progressing  4/16/2025 1703 by Debbi Ray RN  Outcome: Progressing  Flowsheets (Taken 4/16/2025 1703)  Maintains optimal cardiac output and hemodynamic stability: Monitor blood pressure and heart rate     Problem: Gastrointestinal - Adult  Goal: Minimal or absence of nausea and vomiting  4/17/2025 0653 by Josue Bernard RN  Outcome: Progressing  4/16/2025 1703 by Debbi Ray RN  Outcome: Progressing  Flowsheets (Taken 4/16/2025 1703)  Minimal or absence of nausea and vomiting:   Administer IV fluids as ordered to ensure adequate hydration   Administer ordered antiemetic medications as needed   Advance diet as tolerated, if ordered   Provide nonpharmacologic comfort measures as appropriate     Problem: ABCDS Injury Assessment  Goal: Absence of physical injury  4/17/2025 0653 by Josue Bernard RN  Outcome: Progressing  4/16/2025 1703 by Debbi Ray

## 2025-04-17 NOTE — CARE COORDINATION
Case Management Assessment            Discharge Note                    Date / Time of Note: 4/17/2025 10:31 AM                  Discharge Note Completed by: Dick Soliman RN    Patient Name: Bill Fish   YOB: 1980  Diagnosis: Intractable nausea and vomiting [R11.2]  Acute alcoholic gastritis without hemorrhage [K29.20]  Nausea and vomiting, unspecified vomiting type [R11.2]   Date / Time: 4/15/2025 11:09 PM    Current PCP: Dominic Manuel MD  Clinic patient: No    Hospitalization in the last 30 days: No       Advance Directives:  Code Status: Full Code  [place] DNR form completed and on chart: Not Indicated    Financial:  Payor: [employer] [place] MEDICA[place] / Plan: [employer] [place] MEDICA / Product Type: *No Product type* /      Pharmacy:    [facility] #[id number] - [place], [place] - [address] - P [phone] - F [phone]  [address]  [facility] [address]  Phone: [phone] Fax: [phone]      Assistance purchasing medications?: Potential Assistance Purchasing Medications: No  Assistance provided by Case Management: None at this time    Does patient want to participate in local refill/ meds to beds program?:      Meds To Beds General Rules:  1. Can ONLY be done Monday- Friday between 8:30am-5pm  2. Prescription(s) must be in pharmacy by 3pm to be filled same day  3.Copy of patient's insurance/ prescription drug card and patient face sheet must be sent along with the prescription(s)  4. Cost of Rx cannot be added to hospital bill. If financial assistance is needed, please contact unit  or ;  or  CANNOT provide pharmacy voucher for patients co-pays  5. Patients can then  the prescription on their way out of the hospital at discharge, or pharmacy can deliver to the bedside if staff is available. (payment due at time of pick-up or delivery - cash, check, or card accepted)     Able to afford home

## 2025-04-17 NOTE — DISCHARGE SUMMARY
V2.0  Discharge Summary    Name:  Bill Fish /Age/Sex: 1980 (45 y.o. male)   Admit Date: 4/15/2025  Discharge Date: 25    MRN & CSN:  [id number] & [id number] Encounter Date and Time 25 9:22 AM EDT    Attending:  Rafael Pickett DO Discharging Provider: Rafael Pickett DO       Hospital Course:     Brief HPI: Bill Fish is a 45 y.o. male who presented with a complaint of intractable nausea vomiting for the past 24 hours. Patient stated he started having numb bloody nonbilious emesis multiple episodes associated with persistent nausea. Patient denies any abdominal pain diarrhea or constipation. Patient denies any fever or chills. Patient admits to daily use of liquor. Last alcohol use was greater than 12 hours. In ED patient was found to have labs remarkable for lipase of 70. Mild elevation of LFTs. Patient is currently being admitted under inpatient status for further management and evaluation of acute pancreatitis     Brief Problem Based Course:     Intractable nausea and vomiting  Suspect alcohol induced gastritis   - Continue prn antiemetics, IV fluids while not taking PO. Advance diet as tolerated. Patient requested to be advanced to regular diet yesterday  - Patient is being discharged to home in stable condition. Patient advised on abstinence from alcohol. Outpatient resources provided for alcohol use treatment/therapy      Alcohol use disorder  - CIWA protocol with ativan, has not required any prn ativan on admission. Outpatient resources provided for alcohol use treatment/therapy     The patient expressed appropriate understanding of, and agreement with the discharge recommendations, medications, and plan.     Consults this admission:  IP CONSULT TO SOCIAL WORK    Discharge Diagnosis:   Intractable nausea and vomiting  Alcohol use disorder    Discharge Instruction:   Follow up appointments: PCP  Primary care physician: Dominic Manuel MD within 1 week  Diet: regular diet

## 2025-04-17 NOTE — PLAN OF CARE
Problem: Discharge Planning  Goal: Discharge to home or other facility with appropriate resources  4/17/2025 0930 by Debbi Ray RN  Outcome: Progressing  Flowsheets (Taken 4/17/2025 0928)  Discharge to home or other facility with appropriate resources:   Identify barriers to discharge with patient and caregiver   Identify discharge learning needs (meds, wound care, etc)     Problem: Safety - Adult  Goal: Free from fall injury  4/17/2025 0930 by Debbi Ray RN  Outcome: Progressing  Flowsheets (Taken 4/17/2025 0928)  Free From Fall Injury: Instruct family/caregiver on patient safety  Note: Patient in lowest position, bed breaks locked, bed alarm in placed, call light within reach, and encouraged to call for assistance. Video monitoring in placed for safety. Seizure precautions        Problem: Cardiovascular - Adult  Goal: Maintains optimal cardiac output and hemodynamic stability  4/17/2025 0930 by Debbi Ray RN  Outcome: Progressing  Flowsheets (Taken 4/17/2025 0928)  Maintains optimal cardiac output and hemodynamic stability: Monitor blood pressure and heart rate     Problem: Gastrointestinal - Adult  Goal: Minimal or absence of nausea and vomiting  4/17/2025 0930 by Debbi Ray RN  Outcome: Progressing  Flowsheets (Taken 4/17/2025 0928)  Minimal or absence of nausea and vomiting:   Administer IV fluids as ordered to ensure adequate hydration   Administer ordered antiemetic medications as needed   Advance diet as tolerated, if ordered     Problem: ABCDS Injury Assessment  Goal: Absence of physical injury  4/17/2025 0930 by Debbi Ray RN  Outcome: Progressing  Flowsheets (Taken 4/17/2025 0928)  Absence of Physical Injury: Implement safety measures based on patient assessment     Problem: Pain  Goal: Verbalizes/displays adequate comfort level or baseline comfort level  4/17/2025 0930 by Debbi Ray RN  Outcome: Progressing  Flowsheets (Taken 4/17/2025 0928)  Verbalizes/displays adequate

## 2025-04-17 NOTE — PROGRESS NOTES
Patient discharged home via [employer]. Patient wheelchair down to lobby and placed in [employer] vehicle. Patient given and educated on discharge instructions and verbalized understanding. Patient Ivs and telemetry monitor removed prior to discharge. All patient belongings discharge with patient. Meds to bed discharged with patient.

## 2025-04-17 NOTE — PROGRESS NOTES
Patient is AOx4. Patient w/ c/o N/V. Shift assessment completed, VSS.   Vitals:    04/17/25 0045   BP: (!) 131/93   Pulse: 80   Resp: 18   Temp: 98.9 °F (37.2 °C)   SpO2: 98%    .  Scheduled medications administered.  Careplan and education was discussed with Bill Fish and mutually agreed upon. Patient voiced no concerns at this time. Does not appear to be in distress. Call light in reach, safety precautions in place.

## 2025-04-17 NOTE — PROGRESS NOTES
Patient is AOx4. Patient w c/o N/V. Shift assessment completed, VSS.   Vitals:    04/17/25 0530   BP: (!) 151/96   Pulse: 84   Resp: 16   Temp: 98.7 °F (37.1 °C)   SpO2: 99%    .  Scheduled medications administered.  Careplan and education was discussed with Bill Fish and mutually agreed upon. Patient voiced no concerns at this time. Does not appear to be in distress. Call light in reach, safety precautions in place.

## 2025-04-18 ENCOUNTER — CARE COORDINATION (OUTPATIENT)
Dept: CASE MANAGEMENT | Age: 45
End: 2025-04-18

## 2025-04-18 ENCOUNTER — TELEPHONE (OUTPATIENT)
Dept: INTERNAL MEDICINE CLINIC | Age: 45
End: 2025-04-18

## 2025-04-18 NOTE — CARE COORDINATION
Care Transitions Note    Initial Call - Call within 2 business days of discharge: Yes    Attempted to reach patient for transitions of care follow up. Unable to reach patient.    Outreach Attempts:   Unable to leave message.     Patient: Bill Fish    Patient : 1980   MRN: [id number]    Reason for Admission: NAUSEA  Discharge Date: 25  RURS: Readmission Risk Score: 14.8    Last Discharge Facility       Date Complaint Diagnosis Description Type Department Provider    4/15/25 Nausea Nausea and vomiting, unspecified vomiting type ... ED to Hosp-Admission (Discharged) (ADMITTED) [facility] PCU Rafael Pickett DO; Charleen Winston...            Was this an external facility discharge? No    Follow Up Appointment:   Patient does not have a follow up appointment scheduled at time of call.  [facility] PCP PO[facility]      Plan for follow-up on next business day.      Re Clemens RN

## 2025-04-21 ENCOUNTER — CARE COORDINATION (OUTPATIENT)
Dept: CASE MANAGEMENT | Age: 45
End: 2025-04-21

## 2025-04-21 DIAGNOSIS — R74.01 TRANSAMINITIS: Primary | ICD-10-CM

## 2025-04-21 NOTE — CARE COORDINATION
Care Transitions Note    Initial Call - Call within 2 business days of discharge: Yes    Patient Current Location:  Home: [address] [place] [id number]    Care Transition Nurse contacted the patient by telephone to perform post hospital discharge assessment, verified name and  as identifiers.  Provided introduction to self, and explanation of the LPN Care Coordinator role.    Patient: Bill Fish    Patient : 1980   MRN: [id number]    Reason for Admission: nausea   Discharge Date: 25  RURS: Readmission Risk Score: 14.8      Last Discharge Facility       Date Complaint Diagnosis Description Type Department Provider    4/15/25 Nausea Nausea and vomiting, unspecified vomiting type ... ED to Hosp-Admission (Discharged) (ADMITTED) [facility] Rafael Pickett DO; Charleen Winston...            Was this an external facility discharge? No    Additional needs identified to be addressed with provider   No needs identified             Method of communication with provider: none.    Patients top risk factors for readmission: functional cognitive ability, functional physical ability, falls, lack of knowledge about disease, medical condition- , medication management, and polypharmacy    Interventions to address risk factors:   Education:    Review of patient management of conditions/medications:    DME:      Care Summary Note:     SUMMARY  CTN spoke to the pt who reports the following:      -C/O nausea / vomiting:  denies c/o abdominal pain - abstaining from alcohol consumption / /none since d/c - encouraged to setup appt for treatment and to continuing abstinence   -Confusion:  A&O x4 - denies issues with confusion - Will continue to monitor and f/u with physician as needed  -Assistive device:  does not use assistive device - will exercise caution when ambulating to prevent fall / injury    -Denies fever, chills, nausea, vomiting, cough, congestion, SOB / DB, wheezing, chest pain, LE edema / pain, feeling

## 2025-06-22 ENCOUNTER — HOSPITAL ENCOUNTER (EMERGENCY)
Age: 45
Discharge: HOME | End: 2025-06-22
Attending: EMERGENCY MEDICINE
Payer: MEDICAID

## 2025-06-22 VITALS
WEIGHT: 179 LBS | DIASTOLIC BLOOD PRESSURE: 105 MMHG | HEART RATE: 97 BPM | SYSTOLIC BLOOD PRESSURE: 160 MMHG | OXYGEN SATURATION: 97 % | BODY MASS INDEX: 26.51 KG/M2 | RESPIRATION RATE: 17 BRPM | TEMPERATURE: 98.6 F | HEIGHT: 69 IN

## 2025-06-22 DIAGNOSIS — E88.89 KETOSIS (HCC): Primary | ICD-10-CM

## 2025-06-22 LAB
ALBUMIN SERPL-MCNC: 4.9 G/DL (ref 3.4–5)
ALBUMIN/GLOB SERPL: 1.2 {RATIO} (ref 1.1–2.2)
ALP SERPL-CCNC: 94 U/L (ref 40–129)
ALT SERPL-CCNC: 46 U/L (ref 10–40)
ANION GAP SERPL CALCULATED.3IONS-SCNC: 25 MMOL/L (ref 3–16)
AST SERPL-CCNC: 73 U/L (ref 15–37)
BACTERIA URNS QL MICRO: ABNORMAL /HPF
BASOPHILS # BLD: 0.1 K/UL (ref 0–0.2)
BASOPHILS NFR BLD: 1.4 %
BILIRUB SERPL-MCNC: 1.1 MG/DL (ref 0–1)
BILIRUB UR QL STRIP.AUTO: ABNORMAL
BUN SERPL-MCNC: 17 MG/DL (ref 7–20)
CALCIUM SERPL-MCNC: 10 MG/DL (ref 8.3–10.6)
CHLORIDE SERPL-SCNC: 85 MMOL/L (ref 99–110)
CLARITY UR: CLEAR
CO2 SERPL-SCNC: 24 MMOL/L (ref 21–32)
COLOR UR: YELLOW
CREAT SERPL-MCNC: 1.5 MG/DL (ref 0.9–1.3)
DEPRECATED RDW RBC AUTO: 16.6 % (ref 12.4–15.4)
EKG ATRIAL RATE: 83 BPM
EKG DIAGNOSIS: NORMAL
EKG P AXIS: 62 DEGREES
EKG P-R INTERVAL: 176 MS
EKG Q-T INTERVAL: 388 MS
EKG QRS DURATION: 84 MS
EKG QTC CALCULATION (BAZETT): 455 MS
EKG R AXIS: 63 DEGREES
EKG T AXIS: 66 DEGREES
EKG VENTRICULAR RATE: 83 BPM
EOSINOPHIL # BLD: 0.1 K/UL (ref 0–0.6)
EOSINOPHIL NFR BLD: 2.6 %
EPI CELLS #/AREA URNS HPF: ABNORMAL /HPF (ref 0–5)
ETHANOLAMINE SERPL-MCNC: 43 MG/DL (ref 0–0.08)
GFR SERPLBLD CREATININE-BSD FMLA CKD-EPI: 59 ML/MIN/{1.73_M2}
GLUCOSE SERPL-MCNC: 96 MG/DL (ref 70–99)
GLUCOSE UR STRIP.AUTO-MCNC: 250 MG/DL
HCT VFR BLD AUTO: 38.4 % (ref 40.5–52.5)
HGB BLD-MCNC: 12.8 G/DL (ref 13.5–17.5)
HGB UR QL STRIP.AUTO: NEGATIVE
HYALINE CASTS #/AREA URNS LPF: ABNORMAL /LPF (ref 0–2)
KETONES UR STRIP.AUTO-MCNC: >=80 MG/DL
LACTATE BLDV-SCNC: 2.5 MMOL/L (ref 0.4–2)
LEUKOCYTE ESTERASE UR QL STRIP.AUTO: ABNORMAL
LIPASE SERPL-CCNC: 52 U/L (ref 13–60)
LYMPHOCYTES # BLD: 1.6 K/UL (ref 1–5.1)
LYMPHOCYTES NFR BLD: 37 %
MCH RBC QN AUTO: 30.4 PG (ref 26–34)
MCHC RBC AUTO-ENTMCNC: 33.3 G/DL (ref 31–36)
MCV RBC AUTO: 91.1 FL (ref 80–100)
MONOCYTES # BLD: 0.4 K/UL (ref 0–1.3)
MONOCYTES NFR BLD: 8.8 %
MUCOUS THREADS #/AREA URNS LPF: ABNORMAL /LPF
NEUTROPHILS # BLD: 2.2 K/UL (ref 1.7–7.7)
NEUTROPHILS NFR BLD: 50.2 %
NITRITE UR QL STRIP.AUTO: NEGATIVE
PH UR STRIP.AUTO: 6.5 [PH] (ref 5–8)
PLATELET # BLD AUTO: 333 K/UL (ref 135–450)
PMV BLD AUTO: 6.6 FL (ref 5–10.5)
POTASSIUM SERPL-SCNC: 3.7 MMOL/L (ref 3.5–5.1)
PROT SERPL-MCNC: 8.9 G/DL (ref 6.4–8.2)
PROT UR STRIP.AUTO-MCNC: 100 MG/DL
RBC # BLD AUTO: 4.21 M/UL (ref 4.2–5.9)
RBC #/AREA URNS HPF: ABNORMAL /HPF (ref 0–4)
RENAL EPI CELLS #/AREA UR COMP ASSIST: ABNORMAL /HPF (ref 0–1)
SODIUM SERPL-SCNC: 134 MMOL/L (ref 136–145)
SP GR UR STRIP.AUTO: 1.02 (ref 1–1.03)
UA COMPLETE W REFLEX CULTURE PNL UR: ABNORMAL
UA DIPSTICK W REFLEX MICRO PNL UR: YES
URN SPEC COLLECT METH UR: ABNORMAL
UROBILINOGEN UR STRIP-ACNC: 0.2 E.U./DL
WBC # BLD AUTO: 4.4 K/UL (ref 4–11)
WBC #/AREA URNS HPF: ABNORMAL /HPF (ref 0–5)

## 2025-06-22 PROCEDURE — 6360000002 HC RX W HCPCS: Performed by: STUDENT IN AN ORGANIZED HEALTH CARE EDUCATION/TRAINING PROGRAM

## 2025-06-22 PROCEDURE — 2580000003 HC RX 258: Performed by: EMERGENCY MEDICINE

## 2025-06-22 PROCEDURE — 96375 TX/PRO/DX INJ NEW DRUG ADDON: CPT

## 2025-06-22 PROCEDURE — 6370000000 HC RX 637 (ALT 250 FOR IP): Performed by: EMERGENCY MEDICINE

## 2025-06-22 PROCEDURE — 96361 HYDRATE IV INFUSION ADD-ON: CPT

## 2025-06-22 PROCEDURE — 81001 URINALYSIS AUTO W/SCOPE: CPT

## 2025-06-22 PROCEDURE — 2580000003 HC RX 258: Performed by: STUDENT IN AN ORGANIZED HEALTH CARE EDUCATION/TRAINING PROGRAM

## 2025-06-22 PROCEDURE — 80053 COMPREHEN METABOLIC PANEL: CPT

## 2025-06-22 PROCEDURE — 6360000002 HC RX W HCPCS: Performed by: EMERGENCY MEDICINE

## 2025-06-22 PROCEDURE — 93005 ELECTROCARDIOGRAM TRACING: CPT | Performed by: EMERGENCY MEDICINE

## 2025-06-22 PROCEDURE — 85025 COMPLETE CBC W/AUTO DIFF WBC: CPT

## 2025-06-22 PROCEDURE — 2500000003 HC RX 250 WO HCPCS: Performed by: EMERGENCY MEDICINE

## 2025-06-22 PROCEDURE — 99284 EMERGENCY DEPT VISIT MOD MDM: CPT

## 2025-06-22 PROCEDURE — 83690 ASSAY OF LIPASE: CPT

## 2025-06-22 PROCEDURE — 82077 ASSAY SPEC XCP UR&BREATH IA: CPT

## 2025-06-22 PROCEDURE — 96365 THER/PROPH/DIAG IV INF INIT: CPT

## 2025-06-22 PROCEDURE — 83605 ASSAY OF LACTIC ACID: CPT

## 2025-06-22 PROCEDURE — 96366 THER/PROPH/DIAG IV INF ADDON: CPT

## 2025-06-22 RX ORDER — GAUZE BANDAGE 2" X 2"
100 BANDAGE TOPICAL DAILY
Status: DISCONTINUED | OUTPATIENT
Start: 2025-06-22 | End: 2025-06-22 | Stop reason: HOSPADM

## 2025-06-22 RX ORDER — SODIUM CHLORIDE, SODIUM LACTATE, POTASSIUM CHLORIDE, CALCIUM CHLORIDE 600; 310; 30; 20 MG/100ML; MG/100ML; MG/100ML; MG/100ML
1000 INJECTION, SOLUTION INTRAVENOUS CONTINUOUS
Status: DISCONTINUED | OUTPATIENT
Start: 2025-06-22 | End: 2025-06-22 | Stop reason: HOSPADM

## 2025-06-22 RX ORDER — MULTIVITAMIN WITH IRON
1 TABLET ORAL DAILY
Status: DISCONTINUED | OUTPATIENT
Start: 2025-06-22 | End: 2025-06-22 | Stop reason: HOSPADM

## 2025-06-22 RX ORDER — ONDANSETRON 2 MG/ML
8 INJECTION INTRAMUSCULAR; INTRAVENOUS ONCE
Status: COMPLETED | OUTPATIENT
Start: 2025-06-22 | End: 2025-06-22

## 2025-06-22 RX ORDER — ONDANSETRON 4 MG/1
4 TABLET, ORALLY DISINTEGRATING ORAL 3 TIMES DAILY PRN
Qty: 21 TABLET | Refills: 0 | Status: SHIPPED | OUTPATIENT
Start: 2025-06-22

## 2025-06-22 RX ORDER — DEXTROSE, SODIUM CHLORIDE, SODIUM LACTATE, POTASSIUM CHLORIDE, AND CALCIUM CHLORIDE 5; .6; .31; .03; .02 G/100ML; G/100ML; G/100ML; G/100ML; G/100ML
1000 INJECTION, SOLUTION INTRAVENOUS ONCE
Status: COMPLETED | OUTPATIENT
Start: 2025-06-22 | End: 2025-06-22

## 2025-06-22 RX ORDER — FOLIC ACID 1 MG/1
1 TABLET ORAL DAILY
Status: DISCONTINUED | OUTPATIENT
Start: 2025-06-22 | End: 2025-06-22 | Stop reason: HOSPADM

## 2025-06-22 RX ORDER — DROPERIDOL 2.5 MG/ML
1.25 INJECTION, SOLUTION INTRAMUSCULAR; INTRAVENOUS ONCE
Status: COMPLETED | OUTPATIENT
Start: 2025-06-22 | End: 2025-06-22

## 2025-06-22 RX ORDER — MULTIVIT-MIN/IRON FUM/FOLIC AC 7.5 MG-4
1 TABLET ORAL DAILY
Qty: 30 TABLET | Refills: 3 | Status: SHIPPED | OUTPATIENT
Start: 2025-06-22

## 2025-06-22 RX ADMIN — THERA TABS 1 TABLET: TAB at 08:05

## 2025-06-22 RX ADMIN — Medication 100 MG: at 08:05

## 2025-06-22 RX ADMIN — FOLIC ACID 1 MG: 1 TABLET ORAL at 08:05

## 2025-06-22 RX ADMIN — ONDANSETRON 8 MG: 2 INJECTION, SOLUTION INTRAMUSCULAR; INTRAVENOUS at 06:45

## 2025-06-22 RX ADMIN — DROPERIDOL 1.25 MG: 2.5 INJECTION, SOLUTION INTRAMUSCULAR; INTRAVENOUS at 08:15

## 2025-06-22 RX ADMIN — SODIUM CHLORIDE, SODIUM LACTATE, POTASSIUM CHLORIDE, AND CALCIUM CHLORIDE 1000 ML: .6; .31; .03; .02 INJECTION, SOLUTION INTRAVENOUS at 06:44

## 2025-06-22 RX ADMIN — DEXTROSE, SODIUM CHLORIDE, SODIUM LACTATE, POTASSIUM CHLORIDE, AND CALCIUM CHLORIDE 1000 ML: 5; .6; .31; .03; .02 INJECTION, SOLUTION INTRAVENOUS at 08:17

## 2025-06-22 RX ADMIN — FAMOTIDINE 20 MG: 10 INJECTION, SOLUTION INTRAVENOUS at 07:04

## 2025-06-22 ASSESSMENT — LIFESTYLE VARIABLES
HOW OFTEN DO YOU HAVE A DRINK CONTAINING ALCOHOL: 4 OR MORE TIMES A WEEK
HOW MANY STANDARD DRINKS CONTAINING ALCOHOL DO YOU HAVE ON A TYPICAL DAY: 10 OR MORE

## 2025-06-22 ASSESSMENT — PAIN - FUNCTIONAL ASSESSMENT
PAIN_FUNCTIONAL_ASSESSMENT: NONE - DENIES PAIN
PAIN_FUNCTIONAL_ASSESSMENT: NONE - DENIES PAIN

## 2025-06-22 NOTE — ED PROVIDER NOTES
THE [facility]  EMERGENCY DEPARTMENT ENCOUNTER          ATTENDING PHYSICIAN NOTE       Date of evaluation: 6/22/2025    ADDENDUM:      Care of this patient was assumed from Dr. Gunderson.  The patient was seen for Nausea and Vomiting (Vomiting x 2 days.  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  The patient's initial evaluation and plan have been discussed with the prior provider who initially evaluated the patient.  Nursing Notes, Past Medical Hx, Past Surgical Hx, Social Hx, Allergies, and Family Hx were all reviewed.    ASSESSMENT / PLAN  (MEDICAL DECISION MAKING)     Bill Fish is a 45 y.o. male who presented the emergency department for 3 days of nausea and vomiting and abdominal pain.  Patient has known significant alcohol use history.  Patient came in having episodes of abdominal pain nausea and vomiting.  Laboratory workup notable for ketosis that is urinalysis with hyaline cast consistent with dehydration.  Patient treated with a liter of fluids and then given dextrose containing fluids after he failed initial p.o. challenge.  Lactic acid slightly elevated at 2.5 on initial presentation and ethanol level at 43.  Remaining labs notable for AST and ALT of 73 and 46 respectively.  On repeat assessment after receiving Pepcid as well as Zofran and droperidol after his initial failed p.o. challenge patient now tolerating p.o. including apples and applesauce and other solid foods without difficulty.  Patient would like to be discharged from the emergency department.  He is not showing signs of alcohol withdrawal and has resolution of his tachycardia.  Patient given prescription for Zofran and advised that he needs to eat full meals today and drink plenty of water to help combat his ketosis.  Given multivitamins as well as prescription for multivitamins.  Return precautions discussed.      Is this patient to be included in the SEP-1 core

## 2025-06-22 NOTE — ED NOTES
Pt encouraged to leave urine sample. Pt reports unable to provide sample at this time. Lissa Tierney, RN  06/22/25 0817

## 2025-06-22 NOTE — DISCHARGE INSTRUCTIONS
You were seen in the emergency department for nausea and vomiting.  You were given fluids including sugar containing fluids.  You need to eat full meals today and drink lots of water to get rid of the extra ketones in your blood.  You may take Zofran for nausea and you should also start taking a multivitamin to help supplement your nutrition.  Return to the emergency department if you are continuing to have nausea and vomiting, you find that you are having symptoms of alcohol withdrawal, or for other concerns or symptoms.

## 2025-06-22 NOTE — ED PROVIDER NOTES
THE [facility]  EMERGENCY DEPARTMENT ENCOUNTER          ATTENDING PHYSICIAN NOTE       Date of evaluation: 6/22/2025    Chief Complaint     Nausea and Vomiting (Vomiting x 2 days.  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 male who presents with 3 days of persistent nausea and vomiting.  He says he thought it began couple days ago when he was drinking without eating, which has triggered similar episodes in the past.  Reports since then he has been able to keep down very little.  Took some ODT Zofran yesterday and thought it helped for a minute but then began vomiting again subsequently presents here today.  Reports some occasional abdominal cramping but no abdominal pain otherwise.  Reports infrequent bowel movements but no particular change in the character.  No dysuria but decreased frequency of urination over the last couple days.  Denies any sick contacts.  Reports he normally drinks about a pint of liquor a day    ASSESSMENT / PLAN  (MEDICAL DECISION MAKING)     INITIAL VITALS: BP: (!) 149/110, Temp: 98.1 °F (36.7 °C), Pulse: (!) 107, Respirations: 17, SpO2: 96 %      Bill Fish is a 45 y.o. male who presents with nausea vomiting for the last several days.  His abdominal exam is relatively reassuring.  He is mildly tachycardic on arrival.  He does not appear tremulous or in severe withdrawal clinically right now.  Given 8 mg Zofran and started on IV fluid.  We are obtaining basic labs.  At shift change obtaining the patient over the oncoming physician will follow-up on the results of the labs and his response to treatment..    Is this patient to be included in the SEP-1 core measure? No Exclusion criteria - the patient is NOT to be included for SEP-1 Core Measure due to: Infection is not suspected    Medical Decision Making  Amount and/or Complexity of Data

## 2025-07-04 ENCOUNTER — HOSPITAL ENCOUNTER (INPATIENT)
Age: 45
LOS: 1 days | Discharge: LEFT BEFORE DISCHARGE | End: 2025-07-05
Attending: STUDENT IN AN ORGANIZED HEALTH CARE EDUCATION/TRAINING PROGRAM | Admitting: HOSPITALIST
Payer: MEDICAID

## 2025-07-04 DIAGNOSIS — R11.2 NAUSEA AND VOMITING, UNSPECIFIED VOMITING TYPE: Primary | ICD-10-CM

## 2025-07-04 DIAGNOSIS — E87.20 LACTIC ACIDOSIS: ICD-10-CM

## 2025-07-04 LAB
ALBUMIN SERPL-MCNC: 4.5 G/DL (ref 3.4–5)
ALBUMIN/GLOB SERPL: 1.2 {RATIO} (ref 1.1–2.2)
ALP SERPL-CCNC: 92 U/L (ref 40–129)
ALT SERPL-CCNC: 47 U/L (ref 10–40)
ANION GAP SERPL CALCULATED.3IONS-SCNC: 23 MMOL/L (ref 3–16)
AST SERPL-CCNC: 52 U/L (ref 15–37)
BASOPHILS # BLD: 0.1 K/UL (ref 0–0.2)
BASOPHILS NFR BLD: 1.2 %
BILIRUB SERPL-MCNC: <0.2 MG/DL (ref 0–1)
BUN SERPL-MCNC: 13 MG/DL (ref 7–20)
CALCIUM SERPL-MCNC: 9.2 MG/DL (ref 8.3–10.6)
CHLORIDE SERPL-SCNC: 90 MMOL/L (ref 99–110)
CO2 SERPL-SCNC: 21 MMOL/L (ref 21–32)
CREAT SERPL-MCNC: 1.3 MG/DL (ref 0.9–1.3)
DEPRECATED RDW RBC AUTO: 17.7 % (ref 12.4–15.4)
EOSINOPHIL # BLD: 0 K/UL (ref 0–0.6)
EOSINOPHIL NFR BLD: 0.4 %
GFR SERPLBLD CREATININE-BSD FMLA CKD-EPI: 70 ML/MIN/{1.73_M2}
GLUCOSE BLD-MCNC: 131 MG/DL (ref 70–99)
GLUCOSE SERPL-MCNC: 130 MG/DL (ref 70–99)
HCT VFR BLD AUTO: 34.5 % (ref 40.5–52.5)
HGB BLD-MCNC: 11.8 G/DL (ref 13.5–17.5)
LIPASE SERPL-CCNC: 64 U/L (ref 13–60)
LYMPHOCYTES # BLD: 1.6 K/UL (ref 1–5.1)
LYMPHOCYTES NFR BLD: 32.8 %
MAGNESIUM SERPL-MCNC: 1.59 MG/DL (ref 1.8–2.4)
MCH RBC QN AUTO: 31.1 PG (ref 26–34)
MCHC RBC AUTO-ENTMCNC: 34.2 G/DL (ref 31–36)
MCV RBC AUTO: 90.8 FL (ref 80–100)
MONOCYTES # BLD: 0.6 K/UL (ref 0–1.3)
MONOCYTES NFR BLD: 13.2 %
NEUTROPHILS # BLD: 2.5 K/UL (ref 1.7–7.7)
NEUTROPHILS NFR BLD: 52.4 %
PERFORMED ON: ABNORMAL
PLATELET # BLD AUTO: 278 K/UL (ref 135–450)
PMV BLD AUTO: 6.7 FL (ref 5–10.5)
POTASSIUM SERPL-SCNC: 3.5 MMOL/L (ref 3.5–5.1)
PROT SERPL-MCNC: 8.2 G/DL (ref 6.4–8.2)
RBC # BLD AUTO: 3.8 M/UL (ref 4.2–5.9)
SODIUM SERPL-SCNC: 134 MMOL/L (ref 136–145)
WBC # BLD AUTO: 4.8 K/UL (ref 4–11)

## 2025-07-04 PROCEDURE — 99284 EMERGENCY DEPT VISIT MOD MDM: CPT

## 2025-07-04 PROCEDURE — 96361 HYDRATE IV INFUSION ADD-ON: CPT

## 2025-07-04 PROCEDURE — 83690 ASSAY OF LIPASE: CPT

## 2025-07-04 PROCEDURE — 2580000003 HC RX 258: Performed by: STUDENT IN AN ORGANIZED HEALTH CARE EDUCATION/TRAINING PROGRAM

## 2025-07-04 PROCEDURE — 6360000002 HC RX W HCPCS: Performed by: STUDENT IN AN ORGANIZED HEALTH CARE EDUCATION/TRAINING PROGRAM

## 2025-07-04 PROCEDURE — 85025 COMPLETE CBC W/AUTO DIFF WBC: CPT

## 2025-07-04 PROCEDURE — 83605 ASSAY OF LACTIC ACID: CPT

## 2025-07-04 PROCEDURE — 2500000003 HC RX 250 WO HCPCS: Performed by: STUDENT IN AN ORGANIZED HEALTH CARE EDUCATION/TRAINING PROGRAM

## 2025-07-04 PROCEDURE — 96375 TX/PRO/DX INJ NEW DRUG ADDON: CPT

## 2025-07-04 PROCEDURE — 83735 ASSAY OF MAGNESIUM: CPT

## 2025-07-04 PROCEDURE — 80053 COMPREHEN METABOLIC PANEL: CPT

## 2025-07-04 RX ORDER — MAGNESIUM SULFATE 1 G/100ML
1000 INJECTION INTRAVENOUS ONCE
Status: COMPLETED | OUTPATIENT
Start: 2025-07-04 | End: 2025-07-05

## 2025-07-04 RX ORDER — SODIUM CHLORIDE, SODIUM LACTATE, POTASSIUM CHLORIDE, AND CALCIUM CHLORIDE .6; .31; .03; .02 G/100ML; G/100ML; G/100ML; G/100ML
1000 INJECTION, SOLUTION INTRAVENOUS ONCE
Status: COMPLETED | OUTPATIENT
Start: 2025-07-04 | End: 2025-07-05

## 2025-07-04 RX ORDER — ONDANSETRON 2 MG/ML
4 INJECTION INTRAMUSCULAR; INTRAVENOUS ONCE
Status: COMPLETED | OUTPATIENT
Start: 2025-07-04 | End: 2025-07-04

## 2025-07-04 RX ADMIN — FAMOTIDINE 20 MG: 10 INJECTION, SOLUTION INTRAVENOUS at 22:15

## 2025-07-04 RX ADMIN — ONDANSETRON 4 MG: 2 INJECTION, SOLUTION INTRAMUSCULAR; INTRAVENOUS at 22:16

## 2025-07-04 RX ADMIN — SODIUM CHLORIDE, SODIUM LACTATE, POTASSIUM CHLORIDE, AND CALCIUM CHLORIDE 1000 ML: .6; .31; .03; .02 INJECTION, SOLUTION INTRAVENOUS at 22:22

## 2025-07-04 RX ADMIN — SODIUM CHLORIDE, SODIUM LACTATE, POTASSIUM CHLORIDE, AND CALCIUM CHLORIDE 1000 ML: .6; .31; .03; .02 INJECTION, SOLUTION INTRAVENOUS at 23:28

## 2025-07-04 ASSESSMENT — LIFESTYLE VARIABLES
HOW OFTEN DO YOU HAVE A DRINK CONTAINING ALCOHOL: 4 OR MORE TIMES A WEEK
HOW MANY STANDARD DRINKS CONTAINING ALCOHOL DO YOU HAVE ON A TYPICAL DAY: 5 OR 6
HOW MANY STANDARD DRINKS CONTAINING ALCOHOL DO YOU HAVE ON A TYPICAL DAY: 5 OR 6
HOW OFTEN DO YOU HAVE A DRINK CONTAINING ALCOHOL: 4 OR MORE TIMES A WEEK

## 2025-07-04 ASSESSMENT — PAIN SCALES - GENERAL: PAINLEVEL_OUTOF10: 0

## 2025-07-05 ENCOUNTER — HOSPITAL ENCOUNTER (OUTPATIENT)
Age: 45
Setting detail: OBSERVATION
Discharge: HOME | End: 2025-07-06
Attending: EMERGENCY MEDICINE | Admitting: HOSPITALIST
Payer: MEDICAID

## 2025-07-05 VITALS
SYSTOLIC BLOOD PRESSURE: 139 MMHG | HEART RATE: 82 BPM | BODY MASS INDEX: 26.34 KG/M2 | RESPIRATION RATE: 16 BRPM | WEIGHT: 178.35 LBS | TEMPERATURE: 98.6 F | DIASTOLIC BLOOD PRESSURE: 118 MMHG | OXYGEN SATURATION: 100 %

## 2025-07-05 DIAGNOSIS — R11.2 NAUSEA AND VOMITING, UNSPECIFIED VOMITING TYPE: ICD-10-CM

## 2025-07-05 DIAGNOSIS — F10.929 ACUTE ALCOHOLIC INTOXICATION WITH COMPLICATION: Primary | ICD-10-CM

## 2025-07-05 LAB
ALBUMIN SERPL-MCNC: 4.5 G/DL (ref 3.4–5)
ALBUMIN/GLOB SERPL: 1.4 {RATIO} (ref 1.1–2.2)
ALP SERPL-CCNC: 88 U/L (ref 40–129)
ALT SERPL-CCNC: 47 U/L (ref 10–40)
ANION GAP SERPL CALCULATED.3IONS-SCNC: 21 MMOL/L (ref 3–16)
AST SERPL-CCNC: 54 U/L (ref 15–37)
BASE EXCESS BLDV CALC-SCNC: 1 MMOL/L
BASOPHILS # BLD: 0 K/UL (ref 0–0.2)
BASOPHILS NFR BLD: 1 %
BILIRUB SERPL-MCNC: 0.3 MG/DL (ref 0–1)
BUN SERPL-MCNC: 10 MG/DL (ref 7–20)
CALCIUM SERPL-MCNC: 9 MG/DL (ref 8.3–10.6)
CHLORIDE SERPL-SCNC: 95 MMOL/L (ref 99–110)
CO2 BLDV-SCNC: 27 MMOL/L
CO2 SERPL-SCNC: 24 MMOL/L (ref 21–32)
COHGB MFR BLDV: 1.2 %
CREAT SERPL-MCNC: 1.3 MG/DL (ref 0.9–1.3)
DEPRECATED RDW RBC AUTO: 17.6 % (ref 12.4–15.4)
EOSINOPHIL # BLD: 0.1 K/UL (ref 0–0.6)
EOSINOPHIL NFR BLD: 1.3 %
ETHANOLAMINE SERPL-MCNC: 118 MG/DL (ref 0–0.08)
GFR SERPLBLD CREATININE-BSD FMLA CKD-EPI: 70 ML/MIN/{1.73_M2}
GLUCOSE SERPL-MCNC: 91 MG/DL (ref 70–99)
HCO3 BLDV-SCNC: 26 MMOL/L (ref 23–29)
HCT VFR BLD AUTO: 32.3 % (ref 40.5–52.5)
HGB BLD-MCNC: 10.9 G/DL (ref 13.5–17.5)
LACTATE BLDV-SCNC: 3.3 MMOL/L (ref 0.4–1.9)
LACTATE BLDV-SCNC: 3.5 MMOL/L (ref 0.4–1.9)
LACTATE BLDV-SCNC: 3.6 MMOL/L (ref 0.4–2)
LACTATE BLDV-SCNC: 4.4 MMOL/L (ref 0.4–2)
LACTATE BLDV-SCNC: 4.5 MMOL/L (ref 0.4–2)
LYMPHOCYTES # BLD: 1.2 K/UL (ref 1–5.1)
LYMPHOCYTES NFR BLD: 29.7 %
MCH RBC QN AUTO: 30.9 PG (ref 26–34)
MCHC RBC AUTO-ENTMCNC: 33.7 G/DL (ref 31–36)
MCV RBC AUTO: 91.7 FL (ref 80–100)
METHGB MFR BLDV: 0.7 %
MONOCYTES # BLD: 0.6 K/UL (ref 0–1.3)
MONOCYTES NFR BLD: 13.4 %
NEUTROPHILS # BLD: 2.3 K/UL (ref 1.7–7.7)
NEUTROPHILS NFR BLD: 54.6 %
O2 THERAPY: NORMAL
PCO2 BLDV: 42.8 MMHG (ref 40–50)
PH BLDV: 7.39 [PH] (ref 7.35–7.45)
PLATELET # BLD AUTO: 244 K/UL (ref 135–450)
PMV BLD AUTO: 6.6 FL (ref 5–10.5)
PO2 BLDV: 58 MMHG
POTASSIUM SERPL-SCNC: 3.6 MMOL/L (ref 3.5–5.1)
PROT SERPL-MCNC: 7.8 G/DL (ref 6.4–8.2)
RBC # BLD AUTO: 3.52 M/UL (ref 4.2–5.9)
SAO2 % BLDV: 87 %
SODIUM SERPL-SCNC: 140 MMOL/L (ref 136–145)
WBC # BLD AUTO: 4.2 K/UL (ref 4–11)

## 2025-07-05 PROCEDURE — 6360000002 HC RX W HCPCS: Performed by: EMERGENCY MEDICINE

## 2025-07-05 PROCEDURE — 96365 THER/PROPH/DIAG IV INF INIT: CPT

## 2025-07-05 PROCEDURE — 83605 ASSAY OF LACTIC ACID: CPT

## 2025-07-05 PROCEDURE — 2580000003 HC RX 258: Performed by: HOSPITALIST

## 2025-07-05 PROCEDURE — 96361 HYDRATE IV INFUSION ADD-ON: CPT

## 2025-07-05 PROCEDURE — 2580000003 HC RX 258: Performed by: STUDENT IN AN ORGANIZED HEALTH CARE EDUCATION/TRAINING PROGRAM

## 2025-07-05 PROCEDURE — 80053 COMPREHEN METABOLIC PANEL: CPT

## 2025-07-05 PROCEDURE — 82077 ASSAY SPEC XCP UR&BREATH IA: CPT

## 2025-07-05 PROCEDURE — 2500000003 HC RX 250 WO HCPCS: Performed by: HOSPITALIST

## 2025-07-05 PROCEDURE — 96375 TX/PRO/DX INJ NEW DRUG ADDON: CPT

## 2025-07-05 PROCEDURE — G0378 HOSPITAL OBSERVATION PER HR: HCPCS

## 2025-07-05 PROCEDURE — 85025 COMPLETE CBC W/AUTO DIFF WBC: CPT

## 2025-07-05 PROCEDURE — 99285 EMERGENCY DEPT VISIT HI MDM: CPT

## 2025-07-05 PROCEDURE — 96376 TX/PRO/DX INJ SAME DRUG ADON: CPT

## 2025-07-05 PROCEDURE — 6370000000 HC RX 637 (ALT 250 FOR IP): Performed by: HOSPITALIST

## 2025-07-05 PROCEDURE — 6360000002 HC RX W HCPCS: Performed by: STUDENT IN AN ORGANIZED HEALTH CARE EDUCATION/TRAINING PROGRAM

## 2025-07-05 PROCEDURE — 1200000000 HC SEMI PRIVATE

## 2025-07-05 PROCEDURE — 94760 N-INVAS EAR/PLS OXIMETRY 1: CPT

## 2025-07-05 PROCEDURE — 82803 BLOOD GASES ANY COMBINATION: CPT

## 2025-07-05 PROCEDURE — 6360000002 HC RX W HCPCS: Performed by: HOSPITALIST

## 2025-07-05 RX ORDER — FOLIC ACID 1 MG/1
1000 TABLET ORAL DAILY
Status: CANCELLED | OUTPATIENT
Start: 2025-07-05

## 2025-07-05 RX ORDER — ONDANSETRON 2 MG/ML
4 INJECTION INTRAMUSCULAR; INTRAVENOUS ONCE
Status: COMPLETED | OUTPATIENT
Start: 2025-07-05 | End: 2025-07-05

## 2025-07-05 RX ORDER — LORAZEPAM 1 MG/1
4 TABLET ORAL
Status: DISCONTINUED | OUTPATIENT
Start: 2025-07-05 | End: 2025-07-06 | Stop reason: HOSPADM

## 2025-07-05 RX ORDER — LORAZEPAM 1 MG/1
1 TABLET ORAL
Status: DISCONTINUED | OUTPATIENT
Start: 2025-07-05 | End: 2025-07-06 | Stop reason: HOSPADM

## 2025-07-05 RX ORDER — ONDANSETRON 2 MG/ML
4 INJECTION INTRAMUSCULAR; INTRAVENOUS EVERY 6 HOURS PRN
Status: DISCONTINUED | OUTPATIENT
Start: 2025-07-05 | End: 2025-07-06 | Stop reason: HOSPADM

## 2025-07-05 RX ORDER — DIAZEPAM 10 MG/2ML
5 INJECTION, SOLUTION INTRAMUSCULAR; INTRAVENOUS
Status: DISCONTINUED | OUTPATIENT
Start: 2025-07-05 | End: 2025-07-06 | Stop reason: HOSPADM

## 2025-07-05 RX ORDER — GAUZE BANDAGE 2" X 2"
100 BANDAGE TOPICAL DAILY
Status: DISCONTINUED | OUTPATIENT
Start: 2025-07-05 | End: 2025-07-06 | Stop reason: HOSPADM

## 2025-07-05 RX ORDER — ACETAMINOPHEN 650 MG/1
650 SUPPOSITORY RECTAL EVERY 6 HOURS PRN
Status: DISCONTINUED | OUTPATIENT
Start: 2025-07-05 | End: 2025-07-06 | Stop reason: HOSPADM

## 2025-07-05 RX ORDER — DIAZEPAM 10 MG/2ML
5 INJECTION, SOLUTION INTRAMUSCULAR; INTRAVENOUS EVERY 4 HOURS PRN
Status: DISCONTINUED | OUTPATIENT
Start: 2025-07-05 | End: 2025-07-05

## 2025-07-05 RX ORDER — LORAZEPAM 0.5 MG/1
0.5 TABLET ORAL EVERY 4 HOURS PRN
Status: DISCONTINUED | OUTPATIENT
Start: 2025-07-05 | End: 2025-07-05

## 2025-07-05 RX ORDER — POLYETHYLENE GLYCOL 3350 17 G/17G
17 POWDER, FOR SOLUTION ORAL DAILY PRN
Status: CANCELLED | OUTPATIENT
Start: 2025-07-05

## 2025-07-05 RX ORDER — ENOXAPARIN SODIUM 100 MG/ML
40 INJECTION SUBCUTANEOUS DAILY
Status: CANCELLED | OUTPATIENT
Start: 2025-07-05

## 2025-07-05 RX ORDER — LORAZEPAM 1 MG/1
2 TABLET ORAL
Status: DISCONTINUED | OUTPATIENT
Start: 2025-07-05 | End: 2025-07-05

## 2025-07-05 RX ORDER — ACETAMINOPHEN 325 MG/1
650 TABLET ORAL EVERY 6 HOURS PRN
Status: CANCELLED | OUTPATIENT
Start: 2025-07-05

## 2025-07-05 RX ORDER — MAGNESIUM SULFATE IN WATER 40 MG/ML
2000 INJECTION, SOLUTION INTRAVENOUS PRN
Status: CANCELLED | OUTPATIENT
Start: 2025-07-05

## 2025-07-05 RX ORDER — FOLIC ACID 1 MG/1
1 TABLET ORAL DAILY
Status: DISCONTINUED | OUTPATIENT
Start: 2025-07-05 | End: 2025-07-06 | Stop reason: HOSPADM

## 2025-07-05 RX ORDER — LORAZEPAM 1 MG/1
2 TABLET ORAL
Status: DISCONTINUED | OUTPATIENT
Start: 2025-07-05 | End: 2025-07-06 | Stop reason: HOSPADM

## 2025-07-05 RX ORDER — METOPROLOL TARTRATE 25 MG/1
12.5 TABLET, FILM COATED ORAL 2 TIMES DAILY
Status: CANCELLED | OUTPATIENT
Start: 2025-07-05

## 2025-07-05 RX ORDER — LORAZEPAM 1 MG/1
3 TABLET ORAL
Status: DISCONTINUED | OUTPATIENT
Start: 2025-07-05 | End: 2025-07-05

## 2025-07-05 RX ORDER — ONDANSETRON 2 MG/ML
4 INJECTION INTRAMUSCULAR; INTRAVENOUS EVERY 6 HOURS PRN
Status: CANCELLED | OUTPATIENT
Start: 2025-07-05

## 2025-07-05 RX ORDER — ACETAMINOPHEN 325 MG/1
650 TABLET ORAL EVERY 6 HOURS PRN
Status: DISCONTINUED | OUTPATIENT
Start: 2025-07-05 | End: 2025-07-06 | Stop reason: HOSPADM

## 2025-07-05 RX ORDER — POLYETHYLENE GLYCOL 3350 17 G/17G
17 POWDER, FOR SOLUTION ORAL DAILY PRN
Status: DISCONTINUED | OUTPATIENT
Start: 2025-07-05 | End: 2025-07-06 | Stop reason: HOSPADM

## 2025-07-05 RX ORDER — MAGNESIUM SULFATE IN WATER 40 MG/ML
2000 INJECTION, SOLUTION INTRAVENOUS PRN
Status: DISCONTINUED | OUTPATIENT
Start: 2025-07-05 | End: 2025-07-06 | Stop reason: HOSPADM

## 2025-07-05 RX ORDER — POTASSIUM CHLORIDE 1500 MG/1
40 TABLET, EXTENDED RELEASE ORAL PRN
Status: DISCONTINUED | OUTPATIENT
Start: 2025-07-05 | End: 2025-07-06 | Stop reason: HOSPADM

## 2025-07-05 RX ORDER — SODIUM CHLORIDE 9 MG/ML
INJECTION, SOLUTION INTRAVENOUS PRN
Status: CANCELLED | OUTPATIENT
Start: 2025-07-05

## 2025-07-05 RX ORDER — SODIUM CHLORIDE 9 MG/ML
INJECTION, SOLUTION INTRAVENOUS CONTINUOUS
Status: CANCELLED | OUTPATIENT
Start: 2025-07-05 | End: 2025-07-06

## 2025-07-05 RX ORDER — POTASSIUM CHLORIDE 7.45 MG/ML
10 INJECTION INTRAVENOUS PRN
Status: DISCONTINUED | OUTPATIENT
Start: 2025-07-05 | End: 2025-07-06 | Stop reason: HOSPADM

## 2025-07-05 RX ORDER — LORAZEPAM 1 MG/1
3 TABLET ORAL
Status: DISCONTINUED | OUTPATIENT
Start: 2025-07-05 | End: 2025-07-06 | Stop reason: HOSPADM

## 2025-07-05 RX ORDER — ACETAMINOPHEN 650 MG/1
650 SUPPOSITORY RECTAL EVERY 6 HOURS PRN
Status: CANCELLED | OUTPATIENT
Start: 2025-07-05

## 2025-07-05 RX ORDER — SODIUM CHLORIDE 0.9 % (FLUSH) 0.9 %
5-40 SYRINGE (ML) INJECTION PRN
Status: DISCONTINUED | OUTPATIENT
Start: 2025-07-05 | End: 2025-07-06 | Stop reason: HOSPADM

## 2025-07-05 RX ORDER — SODIUM CHLORIDE 9 MG/ML
INJECTION, SOLUTION INTRAVENOUS CONTINUOUS
Status: DISCONTINUED | OUTPATIENT
Start: 2025-07-05 | End: 2025-07-06

## 2025-07-05 RX ORDER — DIAZEPAM 10 MG/2ML
10 INJECTION, SOLUTION INTRAMUSCULAR; INTRAVENOUS
Status: DISCONTINUED | OUTPATIENT
Start: 2025-07-05 | End: 2025-07-06 | Stop reason: HOSPADM

## 2025-07-05 RX ORDER — ONDANSETRON 4 MG/1
4 TABLET, ORALLY DISINTEGRATING ORAL EVERY 8 HOURS PRN
Status: DISCONTINUED | OUTPATIENT
Start: 2025-07-05 | End: 2025-07-06 | Stop reason: HOSPADM

## 2025-07-05 RX ORDER — LORAZEPAM 1 MG/1
4 TABLET ORAL
Status: DISCONTINUED | OUTPATIENT
Start: 2025-07-05 | End: 2025-07-05

## 2025-07-05 RX ORDER — GAUZE BANDAGE 2" X 2"
100 BANDAGE TOPICAL DAILY
Status: CANCELLED | OUTPATIENT
Start: 2025-07-05

## 2025-07-05 RX ORDER — SODIUM CHLORIDE 9 MG/ML
INJECTION, SOLUTION INTRAVENOUS ONCE
Status: COMPLETED | OUTPATIENT
Start: 2025-07-05 | End: 2025-07-05

## 2025-07-05 RX ORDER — DIAZEPAM 10 MG/2ML
20 INJECTION, SOLUTION INTRAMUSCULAR; INTRAVENOUS
Status: DISCONTINUED | OUTPATIENT
Start: 2025-07-05 | End: 2025-07-06 | Stop reason: HOSPADM

## 2025-07-05 RX ORDER — LORAZEPAM 1 MG/1
1 TABLET ORAL
Status: DISCONTINUED | OUTPATIENT
Start: 2025-07-05 | End: 2025-07-05

## 2025-07-05 RX ORDER — SODIUM CHLORIDE 0.9 % (FLUSH) 0.9 %
5-40 SYRINGE (ML) INJECTION EVERY 12 HOURS SCHEDULED
Status: CANCELLED | OUTPATIENT
Start: 2025-07-05

## 2025-07-05 RX ORDER — ENOXAPARIN SODIUM 100 MG/ML
40 INJECTION SUBCUTANEOUS DAILY
Status: DISCONTINUED | OUTPATIENT
Start: 2025-07-05 | End: 2025-07-06 | Stop reason: HOSPADM

## 2025-07-05 RX ORDER — POTASSIUM CHLORIDE 7.45 MG/ML
10 INJECTION INTRAVENOUS PRN
Status: CANCELLED | OUTPATIENT
Start: 2025-07-05

## 2025-07-05 RX ORDER — POTASSIUM CHLORIDE 1500 MG/1
40 TABLET, EXTENDED RELEASE ORAL PRN
Status: CANCELLED | OUTPATIENT
Start: 2025-07-05

## 2025-07-05 RX ORDER — SODIUM CHLORIDE 0.9 % (FLUSH) 0.9 %
5-40 SYRINGE (ML) INJECTION PRN
Status: CANCELLED | OUTPATIENT
Start: 2025-07-05

## 2025-07-05 RX ORDER — SODIUM CHLORIDE 9 MG/ML
INJECTION, SOLUTION INTRAVENOUS PRN
Status: DISCONTINUED | OUTPATIENT
Start: 2025-07-05 | End: 2025-07-06 | Stop reason: HOSPADM

## 2025-07-05 RX ORDER — ONDANSETRON 4 MG/1
4 TABLET, ORALLY DISINTEGRATING ORAL EVERY 8 HOURS PRN
Status: CANCELLED | OUTPATIENT
Start: 2025-07-05

## 2025-07-05 RX ORDER — MULTIVITAMIN WITH IRON
1 TABLET ORAL DAILY
Status: DISCONTINUED | OUTPATIENT
Start: 2025-07-05 | End: 2025-07-06 | Stop reason: HOSPADM

## 2025-07-05 RX ORDER — SODIUM CHLORIDE 0.9 % (FLUSH) 0.9 %
5-40 SYRINGE (ML) INJECTION EVERY 12 HOURS SCHEDULED
Status: DISCONTINUED | OUTPATIENT
Start: 2025-07-05 | End: 2025-07-06 | Stop reason: HOSPADM

## 2025-07-05 RX ORDER — DIAZEPAM 10 MG/2ML
15 INJECTION, SOLUTION INTRAMUSCULAR; INTRAVENOUS
Status: DISCONTINUED | OUTPATIENT
Start: 2025-07-05 | End: 2025-07-06 | Stop reason: HOSPADM

## 2025-07-05 RX ORDER — METOPROLOL TARTRATE 25 MG/1
12.5 TABLET, FILM COATED ORAL 2 TIMES DAILY
Status: DISCONTINUED | OUTPATIENT
Start: 2025-07-05 | End: 2025-07-06 | Stop reason: HOSPADM

## 2025-07-05 RX ADMIN — FOLIC ACID 1 MG: 1 TABLET ORAL at 20:52

## 2025-07-05 RX ADMIN — Medication 100 MG: at 20:53

## 2025-07-05 RX ADMIN — METOPROLOL TARTRATE 12.5 MG: 25 TABLET, FILM COATED ORAL at 20:53

## 2025-07-05 RX ADMIN — SODIUM CHLORIDE: 0.9 INJECTION, SOLUTION INTRAVENOUS at 01:24

## 2025-07-05 RX ADMIN — ONDANSETRON 4 MG: 2 INJECTION INTRAMUSCULAR; INTRAVENOUS at 20:17

## 2025-07-05 RX ADMIN — ONDANSETRON 4 MG: 2 INJECTION INTRAMUSCULAR; INTRAVENOUS at 12:31

## 2025-07-05 RX ADMIN — SODIUM CHLORIDE: 0.9 INJECTION, SOLUTION INTRAVENOUS at 14:43

## 2025-07-05 RX ADMIN — THERA TABS 1 TABLET: TAB at 20:53

## 2025-07-05 RX ADMIN — SODIUM CHLORIDE, PRESERVATIVE FREE 10 ML: 5 INJECTION INTRAVENOUS at 20:54

## 2025-07-05 RX ADMIN — ONDANSETRON 4 MG: 2 INJECTION, SOLUTION INTRAMUSCULAR; INTRAVENOUS at 09:45

## 2025-07-05 RX ADMIN — MAGNESIUM SULFATE HEPTAHYDRATE 1000 MG: 1 INJECTION, SOLUTION INTRAVENOUS at 00:27

## 2025-07-05 RX ADMIN — METOPROLOL TARTRATE 12.5 MG: 25 TABLET, FILM COATED ORAL at 14:46

## 2025-07-05 ASSESSMENT — PAIN SCALES - GENERAL
PAINLEVEL_OUTOF10: 0
PAINLEVEL_OUTOF10: 0

## 2025-07-05 NOTE — ED NOTES
ED TO INPATIENT SBAR HANDOFF    Patient Name: Bill Fish   Preferred Name: Bill DYE  : 1980  45 y.o.   Family/Caregiver Present: no   Code Status Order: Prior  PO Status: NPO:No  Telemetry Order: No  C-SSRS: Risk of Suicide: No Risk  Sitter no  Restraints:     Sepsis Risk Score      Situation  Chief Complaint   Patient presents with    Vomiting     Pt arrives to er with c/o vomiting, states he was here this am and left ama. Pt states he continues to vomit.      Brief Description of Patient's Condition: Pt is A&O. Pt is ambulatory   Mental Status: oriented and alert  Arrived from:Home  Imaging:   No orders to display     Abnormal labs:   Abnormal Labs Reviewed   CBC WITH AUTO DIFFERENTIAL - Abnormal; Notable for the following components:       Result Value    RBC 3.52 (*)     Hemoglobin 10.9 (*)     Hematocrit 32.3 (*)     RDW 17.6 (*)     All other components within normal limits   COMPREHENSIVE METABOLIC PANEL - Abnormal; Notable for the following components:    Chloride 95 (*)     Anion Gap 21 (*)     ALT 47 (*)     AST 54 (*)     All other components within normal limits   LACTATE, SEPSIS - Abnormal; Notable for the following components:    Lactic Acid, Sepsis 3.5 (*)     All other components within normal limits       Background  Allergies:   Allergies   Allergen Reactions    Amlodipine Swelling and Angioedema    Banana Shortness Of Breath and Other (See Comments)     Scratch throat    Tree Nut  [Macadamia Nut Oil] Shortness Of Breath and Other (See Comments)     Scratchy throat    Carvedilol Other (See Comments)     chest pain     History:   Past Medical History:   Diagnosis Date    Alcohol abuse     Anxiety     Hypertension        Assessment  Vitals:        Vitals:    25 0756 25 1000   BP: (!) 156/107 (!) 139/93   Pulse: 85 85   Resp: 18 18   Temp: 98.1 °F (36.7 °C)    TempSrc: Oral    SpO2: 96% 96%   Weight: 84 kg (185 lb 3 oz)      Deterioration Index (DI): Deterioration Index:

## 2025-07-05 NOTE — ED PROVIDER NOTES
EMERGENCY DEPARTMENT COURSE and DIFFERENTIAL DIAGNOSIS/MDM:     Patient seen and evaluated. At presentation, patient was awake, alert, afebrile, hemodynamically stable, and satting 96% on room air.  He reports still feeling nauseous.  He was given IV Zofran.    I read through Dr. Herndon's note. Patient was given 3L IVF Bolus. I obtained lactate here.  Given the 3 L overnight, will not order additional IV fluids at this time.    Lactate level obtained which is elevated at 3.5.  Ethanol level was 119.  Creatinine is 1.3.  Liver enzymes slightly elevated with ALT 47 and AST 54.  However, this is without significant change compared to overnight.  Will repaged the hospitalist.  Patient states that he will stay if he is admitted.       CONSULTS: (Who and What was discussed)  IP CONSULT TO HOSPITALIST    Is this patient to be included in the SEP-1 Core Measure due to severe sepsis or septic shock?   No     Exclusion criteria - the patient is NOT to be included for SEP-1 Core Measure due to:  Infection is not suspected     During the patient's ED course, the patient was given:  Medications   ondansetron (ZOFRAN) injection 4 mg (has no administration in time range)            I am the Primary Clinician of Record.    FINAL IMPRESSION      1. Acute alcoholic intoxication with complication    2. Nausea and vomiting, unspecified vomiting type          DISPOSITION/PLAN     DISPOSITION Decision To Admit 07/05/2025 09:19:11 AM   DISPOSITION CONDITION Stable           PATIENT REFERRED TO:  No follow-up provider specified.    DISCHARGE MEDICATIONS:  Patient was given scripts for the following medications. I counseled patient how to take these medications:  New Prescriptions    No medications on file       DISCONTINUED MEDICATIONS:  Discontinued Medications    No medications on file       Pt was seen during the COVID 19 pandemic. Appropriate PPE worn by ME during patient

## 2025-07-05 NOTE — H&P
HISTORY AND PHYSICAL             Date: 7/5/2025        Patient Name: Bill Fish     YOB: 1980      Age:  45 y.o.    Chief Complaint     Chief Complaint   Patient presents with    Vomiting     After drinking a pint vodka.  Last emesis in the AM.  Patient states he is dehydrated and cramping..      {If No Chief Complaint populates above, delete No Chief Complaint and press F2 to enter the patient's Chief Complaint. This portion of the note will disappear and the added Chief Complaint will remain when this note is signed:[id number]::\"”***”\"}    History Obtained From   {HISTORY SOURCE:[id number]:\"patient\"}    History of Present Illness   ***    Past Medical History     Past Medical History:   Diagnosis Date    Alcohol abuse     Anxiety     Hypertension         Past Surgical History   No past surgical history on file.     Medications Prior to Admission     Prior to Admission medications    Medication Sig Start Date End Date Taking? Authorizing Provider   Multiple Vitamins-Minerals (MULTIVITAMIN WITH MINERALS) tablet Take 1 tablet by mouth daily 6/22/25   Katherin Joyce MD   ondansetron (ZOFRAN-ODT) 4 MG disintegrating tablet Take 1 tablet by mouth 3 times daily as needed for Nausea or Vomiting 6/22/25   Katherin Joyce MD   ondansetron (ZOFRAN-ODT) 4 MG disintegrating tablet Take 1 tablet by mouth 3 times daily as needed for Nausea or Vomiting 4/17/25   Rafael Pickett,    folic acid (FOLVITE) 1 MG tablet TAKE 1 TABLET BY MOUTH EVERY DAY 9/12/24   Colleen Zaman DO   Multiple Vitamins-Minerals (THERAPEUTIC MULTIVITAMIN-MINERALS) tablet Take 1 tablet by mouth daily 8/16/24   Marge Bliss MD   metoprolol tartrate (LOPRESSOR) 25 MG tablet Take 0.5 tablets by mouth 2 times daily 7/18/24   Rudy Nieto MD   thiamine mononitrate (THIAMINE) 100 MG tablet Take 1 tablet by mouth daily  Patient not taking: Reported on 4/21/2025 6/29/24   Mariah Simmons MD        Allergies    Amlodipine, Banana, Tree nut  [macadamia nut oil], and Carvedilol    Social History     Social History       Tobacco History       Smoking Status  Every Day Smoking Tobacco Type  Cigarettes, E-Cigarettes      Smokeless Tobacco Use  Never      Tobacco Comments  vapes              Alcohol History       Alcohol Use Status  Yes              Drug Use       Drug Use Status  Not Currently              Sexual Activity       Sexually Active  Yes Partners  Female                    Family History     Family History   Problem Relation Age of Onset    Glaucoma Father     Colon Cancer Father        Review of Systems   Review of Systems    Physical Exam   BP (!) 139/118   Pulse 82   Temp 98.6 °F (37 °C) (Oral)   Resp 16   Wt 80.9 kg (178 lb 5.6 oz)   SpO2 100%   BMI 26.34 kg/m²     Physical Exam    Labs      Recent Results (from the past 24 hours)   CBC with Auto Differential    Collection Time: 07/04/25 10:24 PM   Result Value Ref Range    WBC 4.8 4.0 - 11.0 K/uL    RBC 3.80 (L) 4.20 - 5.90 M/uL    Hemoglobin 11.8 (L) 13.5 - 17.5 g/dL    Hematocrit 34.5 (L) 40.5 - 52.5 %    MCV 90.8 80.0 - 100.0 fL    MCH 31.1 26.0 - 34.0 pg    MCHC 34.2 31.0 - 36.0 g/dL    RDW 17.7 (H) 12.4 - 15.4 %    Platelets 278 135 - 450 K/uL    MPV 6.7 5.0 - 10.5 fL    Neutrophils % 52.4 %    Lymphocytes % 32.8 %    Monocytes % 13.2 %    Eosinophils % 0.4 %    Basophils % 1.2 %    Neutrophils Absolute 2.5 1.7 - 7.7 K/uL    Lymphocytes Absolute 1.6 1.0 - 5.1 K/uL    Monocytes Absolute 0.6 0.0 - 1.3 K/uL    Eosinophils Absolute 0.0 0.0 - 0.6 K/uL    Basophils Absolute 0.1 0.0 - 0.2 K/uL   CMP w/ Reflex to MG    Collection Time: 07/04/25 10:24 PM   Result Value Ref Range    Sodium 134 (L) 136 - 145 mmol/L    Potassium reflex Magnesium 3.5 3.5 - 5.1 mmol/L    Chloride 90 (L) 99 - 110 mmol/L    CO2 21 21 - 32 mmol/L    Anion Gap 23 (H) 3 - 16    Glucose 130 (H) 70 - 99 mg/dL    BUN 13 7 - 20 mg/dL    Creatinine 1.3 0.9 - 1.3 mg/dL    Est, Glom Filt

## 2025-07-05 NOTE — ED NOTES
Followed up with Bill Fish on 7/5/2025 at 4:56 AM. Patient left the ED with a disposition of AMA on . Patient cited improved/resolved symptoms as reason. Advised patient to follow up with a primary care physician or return to the Emergency Department if symptoms worsen.   Tati Lugo RN

## 2025-07-05 NOTE — H&P
7/18/24   Rudy Nieto MD   thiamine mononitrate (THIAMINE) 100 MG tablet Take 1 tablet by mouth daily  Patient not taking: Reported on 4/21/2025 6/29/24   Mariah Simmons MD       Allergies:  Amlodipine, Banana, Tree nut  [macadamia nut oil], and Carvedilol    Social History:      The patient currently lives     TOBACCO:   reports that he has been smoking e-cigarettes and cigarettes. He has never used smokeless tobacco.  ETOH:   reports current alcohol use.  E-cigarette/Vaping       Questions Responses    E-cigarette/Vaping Use Current Every Day User    Start Date     Passive Exposure     Quit Date     Counseling Given     Comments               Family History:       Reviewed and negative in regards to presenting illness/complaint.        Problem Relation Age of Onset    Glaucoma Father     Colon Cancer Father        REVIEW OF SYSTEMS COMPLETED:   Pertinent positives as noted in the HPI. All other systems reviewed and negative.    PHYSICAL EXAM PERFORMED:    BP (!) 156/107   Pulse 85   Temp 98.1 °F (36.7 °C) (Oral)   Resp 18   Wt 84 kg (185 lb 3 oz)   SpO2 96%   BMI 27.35 kg/m²     General appearance:  No apparent distress, appears stated age and cooperative.  HEENT:  Normal cephalic, atraumatic without obvious deformity. Pupils equal, round, and reactive to light.  Extra ocular muscles intact. Conjunctivae/corneas clear.  Neck: Supple, with full range of motion. No jugular venous distention. Trachea midline.  Respiratory:  Normal respiratory effort. Clear to auscultation, bilaterally without Rales/Wheezes/Rhonchi.  Cardiovascular:  Regular rate and rhythm with normal S1/S2 without murmurs, rubs or gallops.  Abdomen: Soft, non-tender, non-distended with normal bowel sounds.  Musculoskeletal:  No clubbing, cyanosis or edema bilaterally.  Full range of motion without deformity.  Neurologic:  Neurovascularly intact without any focal sensory/motor deficits. Cranial nerves: II-XII intact, grossly

## 2025-07-05 NOTE — DISCHARGE INSTR - COC
Continuity of Care Form    Patient Name: Bill Fish   :  1980  MRN:  [id number]    Admit date:  2025  Discharge date:  ***    Code Status Order: Prior   Advance Directives:     Admitting Physician:  No admitting provider for patient encounter.  PCP: Dominic Manuel MD    Discharging Nurse: ***  Discharging Hospital Unit/Room#: 009/B-09  Discharging Unit Phone Number: ***    Emergency Contact:   Extended Emergency Contact Information  Primary Emergency Contact: carloz fish  Home Phone: [phone]  Relation: Parent    Past Surgical History:  No past surgical history on file.    Immunization History:   Immunization History   Administered Date(s) Administered    Rabies 2023, 2023    Rabies Immune Globulin 2023    TDaP, ADACEL (age 10y-64y), BOOSTRIX (age 10y+), IM, 0.5mL 2022       Active Problems:  Patient Active Problem List   Diagnosis Code    Alcohol abuse F10.10    Alcohol abuse with withdrawal ([facility]) F10.139    Anxiety F41.9    Tobacco use Z72.0    Alcohol withdrawal, uncomplicated ([facility]) F10.930    Acute kidney injury N17.9    Transaminitis R74.01    Hyponatremia E87.1    Nausea and vomiting R11.2    Intractable nausea and vomiting R11.2       Isolation/Infection:   Isolation            No Isolation          Patient Infection Status    No active infections.   Resolved       Infection Onset Added Last Indicated Last Indicated By Resolved Resolved By    COVID-19 01/15/22 01/17/22 01/15/22 COVID-19 22 Infection                          Nurse Assessment:  Last Vital Signs: BP (!) 156/107   Pulse 85   Temp 98.1 °F (36.7 °C) (Oral)   Resp 18   Wt 84 kg (185 lb 3 oz)   SpO2 96%   BMI 27.35 kg/m²     Last documented pain score (0-10 scale):    Last Weight:   Wt Readings from Last 1 Encounters:   25 84 kg (185 lb 3 oz)     Mental Status:  {IP PT MENTAL STATUS:}    IV Access:  { FABI IV ACCESS:[id number]}    Nursing Mobility/ADLs:  Walking   {P

## 2025-07-05 NOTE — ED PROVIDER NOTES
disintegrating tablet Take 1 tablet by mouth 3 times daily as needed for Nausea or Vomiting, Disp-21 tablet, R-0Normal      !! ondansetron (ZOFRAN-ODT) 4 MG disintegrating tablet Take 1 tablet by mouth 3 times daily as needed for Nausea or Vomiting, Disp-21 tablet, R-0Normal      folic acid (FOLVITE) 1 MG tablet TAKE 1 TABLET BY MOUTH EVERY DAY, Disp-30 tablet, R-5Normal      !! Multiple Vitamins-Minerals (THERAPEUTIC MULTIVITAMIN-MINERALS) tablet Take 1 tablet by mouth daily, Disp-30 tablet, R-0Normal      metoprolol tartrate (LOPRESSOR) 25 MG tablet Take 0.5 tablets by mouth 2 times daily, Disp-7 tablet, R-0Normal      thiamine mononitrate (THIAMINE) 100 MG tablet Take 1 tablet by mouth daily, Disp-30 tablet, R-0Normal       !! - Potential duplicate medications found. Please discuss with provider.          ALLERGIES     is allergic to amlodipine, banana, tree nut  [macadamia nut oil], and carvedilol.    FAMILY HISTORY     He indicated that the status of his father is unknown.       SOCIAL HISTORY       Social History     Tobacco Use    Smoking status: Every Day     Types: E-Cigarettes, Cigarettes    Smokeless tobacco: Never    Tobacco comments:     vapes   Vaping Use    Vaping status: Every Day    Substances: Always   Substance Use Topics    Alcohol use: Yes    Drug use: Not Currently       PHYSICAL EXAM       ED Triage Vitals   BP Systolic BP Percentile Diastolic BP Percentile Temp Temp Source Pulse Respirations SpO2   07/04/25 2147 -- -- 07/04/25 2146 07/04/25 2146 07/04/25 2147 07/04/25 2147 07/04/25 2147   (!) 148/101   98.6 °F (37 °C) Oral 98 14 94 %      Height Weight - Scale         -- 07/04/25 2155          80.9 kg (178 lb 5.6 oz)               Physical Exam  Constitutional:       General: He is not in acute distress.     Appearance: Normal appearance.   HENT:      Head: Normocephalic and atraumatic.      Right Ear: External ear normal.      Left Ear: External ear normal.      Nose: Nose normal.       2216)   famotidine (PEPCID) 20 MG/2ML 20 mg in sodium chloride (PF) 0.9 % 10 mL injection (20 mg IntraVENous Given 7/4/25 2215)   lactated ringers bolus 1,000 mL (0 mLs IntraVENous Stopped 7/5/25 0028)   magnesium sulfate 1000 mg in dextrose 5% 100 mL IVPB (0 mg IntraVENous Stopped 7/5/25 0127)   0.9 % sodium chloride infusion (0 mL/hr IntraVENous Stopped 7/5/25 0434)         PROCEDURES: (None if blank)  Procedures:       CRITICAL CARE: (None if blank)  I personally saw the patient and independently provided 0 minutes of nonconcurrent critical care out of the total shared critical care time provided     This includes multiple reevaluations, vital sign monitoring, pulse oximetry monitoring, telemetry monitoring, clinical response to the IV medications, reviewing the nursing notes, consultation time, dictation/documentation time, and interpretation of the labwork. (This time excludes time spent performing procedures).     DISCHARGE PRESCRIPTIONS: (None if blank)  Discharge Medication List as of 7/5/2025  5:05 AM            I am the primary clinician of record.     FINAL IMPRESSION      1. Nausea and vomiting, unspecified vomiting type    2. Lactic acidosis            DISPOSITION/PLAN   DISPOSITION [place] 07/05/2025 04:55:36 AM               OUTPATIENT FOLLOW UP THE PATIENT:  No follow-up provider specified.      Electronically Signed: Saeid Herndon DO, 07/05/25, 7:00 AM    This report has been produced using speech recognition software and may contain errors related to that system including errors in grammar, punctuation, and spelling, as well as words and phrases that may be inappropriate. If there are any questions or concerns please feel free to contact the dictating provider for clarification.       Saeid Herndon DO  07/05/25 0702

## 2025-07-06 VITALS
TEMPERATURE: 99 F | OXYGEN SATURATION: 99 % | SYSTOLIC BLOOD PRESSURE: 174 MMHG | WEIGHT: 176.59 LBS | DIASTOLIC BLOOD PRESSURE: 105 MMHG | HEART RATE: 81 BPM | BODY MASS INDEX: 26.08 KG/M2 | RESPIRATION RATE: 16 BRPM

## 2025-07-06 LAB
ANION GAP SERPL CALCULATED.3IONS-SCNC: 18 MMOL/L (ref 3–16)
BUN SERPL-MCNC: 9 MG/DL (ref 7–20)
CALCIUM SERPL-MCNC: 9.2 MG/DL (ref 8.3–10.6)
CHLORIDE SERPL-SCNC: 92 MMOL/L (ref 99–110)
CO2 SERPL-SCNC: 23 MMOL/L (ref 21–32)
CREAT SERPL-MCNC: 1.1 MG/DL (ref 0.9–1.3)
GFR SERPLBLD CREATININE-BSD FMLA CKD-EPI: 85 ML/MIN/{1.73_M2}
GLUCOSE SERPL-MCNC: 80 MG/DL (ref 70–99)
LACTATE BLDV-SCNC: 0.7 MMOL/L (ref 0.4–2)
MAGNESIUM SERPL-MCNC: 1.42 MG/DL (ref 1.8–2.4)
PHOSPHATE SERPL-MCNC: 2 MG/DL (ref 2.5–4.9)
POTASSIUM SERPL-SCNC: 3.9 MMOL/L (ref 3.5–5.1)
SODIUM SERPL-SCNC: 133 MMOL/L (ref 136–145)

## 2025-07-06 PROCEDURE — 96376 TX/PRO/DX INJ SAME DRUG ADON: CPT

## 2025-07-06 PROCEDURE — 6360000002 HC RX W HCPCS: Performed by: HOSPITALIST

## 2025-07-06 PROCEDURE — 96365 THER/PROPH/DIAG IV INF INIT: CPT

## 2025-07-06 PROCEDURE — 83735 ASSAY OF MAGNESIUM: CPT

## 2025-07-06 PROCEDURE — 80048 BASIC METABOLIC PNL TOTAL CA: CPT

## 2025-07-06 PROCEDURE — 36415 COLL VENOUS BLD VENIPUNCTURE: CPT

## 2025-07-06 PROCEDURE — 83605 ASSAY OF LACTIC ACID: CPT

## 2025-07-06 PROCEDURE — 2580000003 HC RX 258: Performed by: HOSPITALIST

## 2025-07-06 PROCEDURE — 6370000000 HC RX 637 (ALT 250 FOR IP): Performed by: HOSPITALIST

## 2025-07-06 PROCEDURE — G0378 HOSPITAL OBSERVATION PER HR: HCPCS

## 2025-07-06 PROCEDURE — 94760 N-INVAS EAR/PLS OXIMETRY 1: CPT

## 2025-07-06 PROCEDURE — 84100 ASSAY OF PHOSPHORUS: CPT

## 2025-07-06 PROCEDURE — 96361 HYDRATE IV INFUSION ADD-ON: CPT

## 2025-07-06 PROCEDURE — 96372 THER/PROPH/DIAG INJ SC/IM: CPT

## 2025-07-06 PROCEDURE — 96366 THER/PROPH/DIAG IV INF ADDON: CPT

## 2025-07-06 RX ORDER — FOLIC ACID 1 MG/1
1000 TABLET ORAL DAILY
Qty: 30 TABLET | Refills: 2 | Status: SHIPPED | OUTPATIENT
Start: 2025-07-06

## 2025-07-06 RX ORDER — THIAMINE MONONITRATE (VIT B1) 100 MG
100 TABLET ORAL DAILY
Qty: 30 TABLET | Refills: 2 | Status: SHIPPED | OUTPATIENT
Start: 2025-07-06

## 2025-07-06 RX ORDER — ONDANSETRON 4 MG/1
4 TABLET, ORALLY DISINTEGRATING ORAL 3 TIMES DAILY PRN
Qty: 21 TABLET | Refills: 0 | Status: SHIPPED | OUTPATIENT
Start: 2025-07-06

## 2025-07-06 RX ORDER — PANTOPRAZOLE SODIUM 40 MG/1
40 TABLET, DELAYED RELEASE ORAL
Qty: 30 TABLET | Refills: 0 | Status: SHIPPED | OUTPATIENT
Start: 2025-07-06

## 2025-07-06 RX ADMIN — ONDANSETRON 4 MG: 2 INJECTION INTRAMUSCULAR; INTRAVENOUS at 05:27

## 2025-07-06 RX ADMIN — FOLIC ACID 1 MG: 1 TABLET ORAL at 08:51

## 2025-07-06 RX ADMIN — Medication 250 MG: at 09:51

## 2025-07-06 RX ADMIN — MAGNESIUM SULFATE HEPTAHYDRATE 2000 MG: 40 INJECTION, SOLUTION INTRAVENOUS at 09:54

## 2025-07-06 RX ADMIN — THERA TABS 1 TABLET: TAB at 08:51

## 2025-07-06 RX ADMIN — Medication 100 MG: at 08:51

## 2025-07-06 RX ADMIN — SODIUM CHLORIDE: 0.9 INJECTION, SOLUTION INTRAVENOUS at 05:30

## 2025-07-06 RX ADMIN — ENOXAPARIN SODIUM 40 MG: 100 INJECTION SUBCUTANEOUS at 08:51

## 2025-07-06 RX ADMIN — METOPROLOL TARTRATE 12.5 MG: 25 TABLET, FILM COATED ORAL at 08:51

## 2025-07-06 NOTE — PROGRESS NOTES
2030: Patient calls out at this time with c/o nausea. Patient is noted to have episode of emesis measuring 300 ml output. Emesis is clear in color. Patient is provided with prn Zofran. VSS with exception of patient being hypertensive.Denies CP, palpations or sob at this time.  Patient displays no s/s of acute distress. Scheduled metoprolol is provided. Patient is scored on CIWA scale at this time. Patient scores 7. Per MAR order parameters no medication is provided. Patient is educated on s/s of withdrawal stating last alcoholic drink was Friday 7/4/25 AM. Call light is in reach. Bed is locked, in lowest position. Patient verbalizes no further needs at this time.     Electronically signed by Edel Lanier RN on 7/6/2025 at 12:35 AM

## 2025-07-06 NOTE — PLAN OF CARE
Problem: Discharge Planning  Goal: Discharge to home or other facility with appropriate resources  Outcome: Progressing     Problem: Seizure Precautions  Goal: Remains free of injury related to seizures activity  Outcome: Progressing     Problem: Safety - Adult  Goal: Free from fall injury  Outcome: Progressing     Problem: Neurosensory - Adult  Goal: Achieves stable or improved neurological status  Outcome: Progressing  Goal: Absence of seizures  Outcome: Progressing  Goal: Achieves maximal functionality and self care  Outcome: Progressing     Problem: Skin/Tissue Integrity - Adult  Goal: Skin integrity remains intact  Outcome: Progressing  Goal: Incisions, wounds, or drain sites healing without S/S of infection  Outcome: Progressing  Goal: Oral mucous membranes remain intact  Outcome: Progressing     Problem: Musculoskeletal - Adult  Goal: Return mobility to safest level of function  Outcome: Progressing  Goal: Maintain proper alignment of affected body part  Outcome: Progressing  Goal: Return ADL status to a safe level of function  Outcome: Progressing     Problem: Gastrointestinal - Adult  Goal: Minimal or absence of nausea and vomiting  Outcome: Progressing  Goal: Maintains adequate nutritional intake  Outcome: Progressing     Problem: Infection - Adult  Goal: Absence of infection at discharge  Outcome: Progressing  Goal: Absence of infection during hospitalization  Outcome: Progressing  Goal: Absence of fever/infection during anticipated neutropenic period  Outcome: Progressing     Problem: Metabolic/Fluid and Electrolytes - Adult  Goal: Electrolytes maintained within normal limits  Outcome: Progressing  Goal: Hemodynamic stability and optimal renal function maintained  Outcome: Progressing  Goal: Glucose maintained within prescribed range  Outcome: Progressing     Problem: Hematologic - Adult  Goal: Maintains hematologic stability  Outcome: Progressing

## 2025-07-06 NOTE — CARE COORDINATION
Advance Care Planning   Healthcare Decision Maker:    Primary Decision Maker: carloz soliman - [facility] - [phone]      Today we documented Decision Maker(s) consistent with Legal Next of Kin hierarchy.        Electronically signed by Megha Larson on 7/6/2025 at 11:31 AM   
None  Potential Assistance needed at discharge: N/A            Potential DME:    Patient expects to discharge to: House  Plan for transportation at discharge: Self    Financial    Payor: [employer] [place] MEDICAID / Plan: [employer] [place] [employer] / Product Type: *No Product type* /     Does insurance require precert for SNF: Yes    Potential assistance Purchasing Medications: No  Meds-to-Beds request:        [facility]/pharmacy #[id number] - [place], [place] - [address] - P [phone] - F [phone]  [address]  [facility] [address]  Phone: [phone] Fax: [phone]      Notes:    Factors facilitating achievement of predicted outcomes: Family support, Friend support, and Cooperative    Barriers to discharge: Limited insight into deficits and Medical complications    Additional Case Management Notes: Patient to discharge home with family support.  Patient declined [facility] resources at this time.  Patient reported no other discharge needs.  Patient will schedule himself a Lyft ride at discharge.    The Plan for Transition of Care is related to the following treatment goals of Nausea and vomiting [R11.2]  Acute alcoholic intoxication with complication [F10.929]  Nausea and vomiting, unspecified vomiting type [R11.2]    IF APPLICABLE: The Patient and/or patient representative Bill DYE and his family were provided with a choice of provider and agrees with the discharge plan. Freedom of choice list with basic dialogue that supports the patient's individualized plan of care/goals and shares the quality data associated with the providers was provided to:     Patient Representative Name:       The Patient and/or Patient Representative Agree with the Discharge Plan?      Megha Larson  Case Management Department  Ph: [phone] Fax: [phone]

## 2025-07-06 NOTE — DISCHARGE SUMMARY
07/04/25  2224 07/05/25  0837   AST 52* 54*   ALT 47* 47*   BILITOT <0.2 0.3   ALKPHOS 92 88     Lipids:   Lab Results   Component Value Date/Time    CHOL 225 02/11/2025 10:22 PM    HDL 40 02/11/2025 10:22 PM    TRIG 111 02/11/2025 10:22 PM     Hemoglobin A1C:   Lab Results   Component Value Date/Time    LABA1C 5.5 12/24/2020 05:20 PM     TSH:   Lab Results   Component Value Date/Time    TSH 2.15 01/26/2022 05:14 PM     Troponin: No results found for: \"TROPONINT\"  Lactic Acid:   Recent Labs     07/05/25  0030 07/05/25  0220 07/06/25  0405   LACTA 4.5* 3.6* 0.7     BNP: No results for input(s): \"PROBNP\" in the last 72 hours.  UA:  Lab Results   Component Value Date/Time    NITRU Negative 06/22/2025 10:31 AM    COLORU Yellow 06/22/2025 10:31 AM    PHUR 6.5 06/22/2025 10:31 AM    PHUR 5.5 11/05/2023 03:56 PM    LABCAST 5-10 Hyaline 12/17/2019 11:10 AM    WBCUA 6-9 06/22/2025 10:31 AM    RBCUA 0-2 06/22/2025 10:31 AM    MUCUS 1+ 06/22/2025 10:31 AM    BACTERIA Rare 06/22/2025 10:31 AM    CLARITYU Clear 06/22/2025 10:31 AM    LEUKOCYTESUR TRACE 06/22/2025 10:31 AM    UROBILINOGEN 0.2 06/22/2025 10:31 AM    BILIRUBINUR SMALL 06/22/2025 10:31 AM    BLOODU Negative 06/22/2025 10:31 AM    GLUCOSEU 250 06/22/2025 10:31 AM    KETUA >=80 06/22/2025 10:31 AM     Urine Cultures:   Lab Results   Component Value Date/Time    LABURIN  02/05/2021 01:58 PM     <10,000 CFU/ml mixed skin/urogenital jean paul. No further workup     Blood Cultures: No results found for: \"BC\"  No results found for: \"BLOODCULT2\"  Organism: No results found for: \"ORG\"    Time Spent Discharging patient 50 minutes    Electronically signed by Vinicius Soto MD on 7/6/2025 at 12:21 PM

## 2025-07-06 NOTE — PLAN OF CARE
Problem: Discharge Planning  Goal: Discharge to home or other facility with appropriate resources  7/6/2025 0029 by Edel Lanier, RN  Outcome: Progressing  7/5/2025 1451 by Amina Peña, RN  Outcome: Progressing     Problem: Seizure Precautions  Goal: Remains free of injury related to seizures activity  7/6/2025 0029 by Edel Lanier RN  Outcome: Progressing  Flowsheets  Taken 7/5/2025 2255  Remains free of injury related to seizure activity:   Maintain airway, patient safety  and administer oxygen as ordered   Monitor patient for seizure activity, document and report duration and description of seizure to Licensed Independent Practitioner   If seizure occurs, turn patient to side and suction secretions as needed   Reorient patient post seizure   Seizure pads on all 4 side rails   Instruct patient/family to notify RN of any seizure activity   Instruct patient/family to call for assistance with activity based on assessment  Taken 7/5/2025 2015  Remains free of injury related to seizure activity:   Maintain airway, patient safety  and administer oxygen as ordered   Monitor patient for seizure activity, document and report duration and description of seizure to Licensed Independent Practitioner   Reorient patient post seizure   Instruct patient/family to notify RN of any seizure activity   Instruct patient/family to call for assistance with activity based on assessment   Seizure pads on all 4 side rails   If seizure occurs, turn patient to side and suction secretions as needed  7/5/2025 1451 by Amina Peña, RN  Outcome: Progressing     Problem: Safety - Adult  Goal: Free from fall injury  Outcome: Progressing     Problem: Neurosensory - Adult  Goal: Achieves stable or improved neurological status  Outcome: Progressing  Goal: Absence of seizures  Outcome: Progressing  Goal: Achieves maximal functionality and self care  Outcome: Progressing     Problem: Skin/Tissue Integrity - Adult  Goal: Skin

## 2025-07-06 NOTE — PROGRESS NOTES
Patient Discharged. Discharged instruction provided; patient verbalized understanding. IV removed without complications. Patient ambulate self down to his ride to home.

## 2025-09-03 ENCOUNTER — HOSPITAL ENCOUNTER (EMERGENCY)
Age: 45
Discharge: HOME | End: 2025-09-04
Attending: EMERGENCY MEDICINE
Payer: MEDICAID

## 2025-09-03 DIAGNOSIS — R11.2 INTRACTABLE NAUSEA AND VOMITING: ICD-10-CM

## 2025-09-03 DIAGNOSIS — E88.89 ALCOHOLIC KETOSIS (HCC): Primary | ICD-10-CM

## 2025-09-03 LAB
ALBUMIN SERPL-MCNC: 5.3 G/DL (ref 3.4–5)
ALBUMIN/GLOB SERPL: 1.3 {RATIO} (ref 1.1–2.2)
ALP SERPL-CCNC: 116 U/L (ref 40–129)
ALT SERPL-CCNC: 76 U/L (ref 10–40)
ANION GAP SERPL CALCULATED.3IONS-SCNC: 32 MMOL/L (ref 3–16)
AST SERPL-CCNC: 143 U/L (ref 15–37)
BASOPHILS # BLD: 0.1 K/UL (ref 0–0.2)
BASOPHILS NFR BLD: 1.2 %
BILIRUB SERPL-MCNC: 0.8 MG/DL (ref 0–1)
BUN SERPL-MCNC: 18 MG/DL (ref 7–20)
CALCIUM SERPL-MCNC: 9.9 MG/DL (ref 8.3–10.6)
CHLORIDE SERPL-SCNC: 89 MMOL/L (ref 99–110)
CO2 SERPL-SCNC: 17 MMOL/L (ref 21–32)
CREAT SERPL-MCNC: 1.8 MG/DL (ref 0.9–1.3)
DEPRECATED RDW RBC AUTO: 20.1 % (ref 12.4–15.4)
EOSINOPHIL # BLD: 0 K/UL (ref 0–0.6)
EOSINOPHIL NFR BLD: 0.1 %
ETHANOLAMINE SERPL-MCNC: 227 MG/DL (ref 0–0.08)
GFR SERPLBLD CREATININE-BSD FMLA CKD-EPI: 47 ML/MIN/{1.73_M2}
GLUCOSE SERPL-MCNC: 87 MG/DL (ref 70–99)
HCT VFR BLD AUTO: 37.5 % (ref 40.5–52.5)
HGB BLD-MCNC: 12.5 G/DL (ref 13.5–17.5)
LIPASE SERPL-CCNC: 69 U/L (ref 13–60)
LYMPHOCYTES # BLD: 1.2 K/UL (ref 1–5.1)
LYMPHOCYTES NFR BLD: 18.2 %
MCH RBC QN AUTO: 32.2 PG (ref 26–34)
MCHC RBC AUTO-ENTMCNC: 33.3 G/DL (ref 31–36)
MCV RBC AUTO: 96.6 FL (ref 80–100)
MONOCYTES # BLD: 0.5 K/UL (ref 0–1.3)
MONOCYTES NFR BLD: 7.3 %
NEUTROPHILS # BLD: 4.8 K/UL (ref 1.7–7.7)
NEUTROPHILS NFR BLD: 73.2 %
PLATELET # BLD AUTO: 195 K/UL (ref 135–450)
PMV BLD AUTO: 7 FL (ref 5–10.5)
POTASSIUM SERPL-SCNC: 4.3 MMOL/L (ref 3.5–5.1)
PROT SERPL-MCNC: 9.4 G/DL (ref 6.4–8.2)
RBC # BLD AUTO: 3.88 M/UL (ref 4.2–5.9)
SODIUM SERPL-SCNC: 138 MMOL/L (ref 136–145)
WBC # BLD AUTO: 6.5 K/UL (ref 4–11)

## 2025-09-03 PROCEDURE — 6360000002 HC RX W HCPCS: Performed by: EMERGENCY MEDICINE

## 2025-09-03 PROCEDURE — 99284 EMERGENCY DEPT VISIT MOD MDM: CPT

## 2025-09-03 PROCEDURE — 2580000003 HC RX 258: Performed by: EMERGENCY MEDICINE

## 2025-09-03 PROCEDURE — 96361 HYDRATE IV INFUSION ADD-ON: CPT

## 2025-09-03 PROCEDURE — 80053 COMPREHEN METABOLIC PANEL: CPT

## 2025-09-03 PROCEDURE — 85025 COMPLETE CBC W/AUTO DIFF WBC: CPT

## 2025-09-03 PROCEDURE — 83690 ASSAY OF LIPASE: CPT

## 2025-09-03 PROCEDURE — 82077 ASSAY SPEC XCP UR&BREATH IA: CPT

## 2025-09-03 PROCEDURE — 96375 TX/PRO/DX INJ NEW DRUG ADDON: CPT

## 2025-09-03 RX ORDER — 0.9 % SODIUM CHLORIDE 0.9 %
1000 INTRAVENOUS SOLUTION INTRAVENOUS ONCE
Status: COMPLETED | OUTPATIENT
Start: 2025-09-03 | End: 2025-09-03

## 2025-09-03 RX ORDER — ONDANSETRON 2 MG/ML
4 INJECTION INTRAMUSCULAR; INTRAVENOUS ONCE
Status: COMPLETED | OUTPATIENT
Start: 2025-09-03 | End: 2025-09-03

## 2025-09-03 RX ADMIN — ONDANSETRON 4 MG: 2 INJECTION INTRAMUSCULAR; INTRAVENOUS at 23:27

## 2025-09-03 RX ADMIN — SODIUM CHLORIDE 1000 ML: 9 INJECTION, SOLUTION INTRAVENOUS at 23:27

## 2025-09-04 VITALS
HEIGHT: 69 IN | OXYGEN SATURATION: 98 % | HEART RATE: 98 BPM | TEMPERATURE: 98.3 F | SYSTOLIC BLOOD PRESSURE: 137 MMHG | DIASTOLIC BLOOD PRESSURE: 46 MMHG | WEIGHT: 171.52 LBS | RESPIRATION RATE: 18 BRPM | BODY MASS INDEX: 25.4 KG/M2

## 2025-09-04 LAB
BACTERIA URNS QL MICRO: ABNORMAL /HPF
BILIRUB UR QL STRIP.AUTO: NEGATIVE
CLARITY UR: CLEAR
COLOR UR: YELLOW
EPI CELLS #/AREA URNS AUTO: 2 /HPF (ref 0–5)
GLUCOSE UR STRIP.AUTO-MCNC: NEGATIVE MG/DL
HGB UR QL STRIP.AUTO: ABNORMAL
HYALINE CASTS #/AREA URNS AUTO: 15 /LPF (ref 0–8)
HYALINE CASTS: PRESENT
KETONES UR STRIP.AUTO-MCNC: 80 MG/DL
LEUKOCYTE ESTERASE UR QL STRIP.AUTO: NEGATIVE
NITRITE UR QL STRIP.AUTO: NEGATIVE
PH UR STRIP.AUTO: 6 [PH] (ref 5–8)
PROT UR STRIP.AUTO-MCNC: 100 MG/DL
RBC CLUMPS #/AREA URNS AUTO: 1 /HPF (ref 0–4)
SP GR UR STRIP.AUTO: 1.02 (ref 1–1.03)
UA DIPSTICK W REFLEX MICRO PNL UR: YES
URN SPEC COLLECT METH UR: ABNORMAL
UROBILINOGEN UR STRIP-ACNC: 1 E.U./DL
WBC #/AREA URNS AUTO: 3 /HPF (ref 0–5)

## 2025-09-04 PROCEDURE — 96361 HYDRATE IV INFUSION ADD-ON: CPT

## 2025-09-04 PROCEDURE — 6360000002 HC RX W HCPCS: Performed by: EMERGENCY MEDICINE

## 2025-09-04 PROCEDURE — 96374 THER/PROPH/DIAG INJ IV PUSH: CPT

## 2025-09-04 PROCEDURE — 81001 URINALYSIS AUTO W/SCOPE: CPT

## 2025-09-04 PROCEDURE — 96376 TX/PRO/DX INJ SAME DRUG ADON: CPT

## 2025-09-04 PROCEDURE — 2580000003 HC RX 258: Performed by: EMERGENCY MEDICINE

## 2025-09-04 RX ORDER — PROCHLORPERAZINE EDISYLATE 5 MG/ML
5 INJECTION INTRAMUSCULAR; INTRAVENOUS ONCE
Status: COMPLETED | OUTPATIENT
Start: 2025-09-04 | End: 2025-09-04

## 2025-09-04 RX ORDER — ONDANSETRON 2 MG/ML
4 INJECTION INTRAMUSCULAR; INTRAVENOUS ONCE
Status: COMPLETED | OUTPATIENT
Start: 2025-09-04 | End: 2025-09-04

## 2025-09-04 RX ORDER — 0.9 % SODIUM CHLORIDE 0.9 %
500 INTRAVENOUS SOLUTION INTRAVENOUS ONCE
Status: COMPLETED | OUTPATIENT
Start: 2025-09-04 | End: 2025-09-04

## 2025-09-04 RX ORDER — ONDANSETRON 4 MG/1
4 TABLET, ORALLY DISINTEGRATING ORAL 3 TIMES DAILY PRN
Qty: 21 TABLET | Refills: 0 | Status: SHIPPED | OUTPATIENT
Start: 2025-09-04

## 2025-09-04 RX ORDER — PROMETHAZINE HYDROCHLORIDE 25 MG/1
25 TABLET ORAL 4 TIMES DAILY PRN
Qty: 20 TABLET | Refills: 0 | Status: SHIPPED | OUTPATIENT
Start: 2025-09-04 | End: 2025-09-11

## 2025-09-04 RX ADMIN — SODIUM CHLORIDE 500 ML: 0.9 INJECTION, SOLUTION INTRAVENOUS at 02:32

## 2025-09-04 RX ADMIN — ONDANSETRON 4 MG: 2 INJECTION, SOLUTION INTRAMUSCULAR; INTRAVENOUS at 03:41

## 2025-09-04 RX ADMIN — PROCHLORPERAZINE EDISYLATE 5 MG: 5 INJECTION INTRAMUSCULAR; INTRAVENOUS at 02:26
